# Patient Record
Sex: FEMALE | Race: WHITE | HISPANIC OR LATINO | Employment: UNEMPLOYED | URBAN - METROPOLITAN AREA
[De-identification: names, ages, dates, MRNs, and addresses within clinical notes are randomized per-mention and may not be internally consistent; named-entity substitution may affect disease eponyms.]

---

## 2022-02-28 ENCOUNTER — HOSPITAL ENCOUNTER (EMERGENCY)
Facility: HOSPITAL | Age: 27
Discharge: HOME | End: 2022-02-28
Attending: EMERGENCY MEDICINE | Admitting: EMERGENCY MEDICINE
Payer: MEDICARE

## 2022-02-28 VITALS
HEART RATE: 98 BPM | SYSTOLIC BLOOD PRESSURE: 140 MMHG | RESPIRATION RATE: 18 BRPM | WEIGHT: 242.51 LBS | OXYGEN SATURATION: 99 % | TEMPERATURE: 98.5 F | DIASTOLIC BLOOD PRESSURE: 87 MMHG

## 2022-02-28 DIAGNOSIS — R10.30 LOWER ABDOMINAL PAIN: Primary | ICD-10-CM

## 2022-02-28 LAB
BILIRUB UR QL STRIP: NEGATIVE
CLARITY UR: CLEAR
COLOR UR: YELLOW
EXT PREG TEST URINE: NORMAL
EXT. CONTROL ED NAV: NORMAL
GLUCOSE UR STRIP-MCNC: NEGATIVE MG/DL
HGB UR QL STRIP.AUTO: NEGATIVE
KETONES UR STRIP-MCNC: NEGATIVE MG/DL
LEUKOCYTE ESTERASE UR QL STRIP: NEGATIVE
NITRITE UR QL STRIP: NEGATIVE
PH UR STRIP.AUTO: 6 [PH] (ref 4.5–8)
PROT UR STRIP-MCNC: NEGATIVE MG/DL
SP GR UR STRIP.AUTO: >=1.03 (ref 1–1.03)
UROBILINOGEN UR QL STRIP.AUTO: 0.2 E.U./DL

## 2022-02-28 PROCEDURE — 99284 EMERGENCY DEPT VISIT MOD MDM: CPT

## 2022-02-28 PROCEDURE — 99284 EMERGENCY DEPT VISIT MOD MDM: CPT | Performed by: EMERGENCY MEDICINE

## 2022-02-28 PROCEDURE — 87491 CHLMYD TRACH DNA AMP PROBE: CPT | Performed by: EMERGENCY MEDICINE

## 2022-02-28 PROCEDURE — 81003 URINALYSIS AUTO W/O SCOPE: CPT

## 2022-02-28 PROCEDURE — 87591 N.GONORRHOEAE DNA AMP PROB: CPT | Performed by: EMERGENCY MEDICINE

## 2022-02-28 PROCEDURE — 81025 URINE PREGNANCY TEST: CPT | Performed by: EMERGENCY MEDICINE

## 2022-02-28 RX ORDER — IBUPROFEN 600 MG/1
600 TABLET ORAL ONCE
Status: COMPLETED | OUTPATIENT
Start: 2022-02-28 | End: 2022-02-28

## 2022-02-28 RX ADMIN — IBUPROFEN 600 MG: 600 TABLET ORAL at 16:49

## 2022-02-28 NOTE — ED PROVIDER NOTES
History  Chief Complaint   Patient presents with    Abdominal Pain     Pt report lower abdominal pain for 2 days with pink spotting     11-year-old female without significant past medical history presenting for evaluation of lower abdominal pain that started 2 days ago with radiation into the low back  Patient denies any vaginal bleeding or spotting or hematuria or dysuria to myself  However, triage reports endorsement of pink spotting  States that she has not had anything like this before  She has been working a lot and is very physical at her job, thinks this may be playing a role  She denies any chance of pregnancy, denies any history of STIs  Denies any vaginal discharge  No vaginal lesions  No diarrhea or bloody stools  Pain is not on 1 side more than the other  It is feels like a diffuse pain in her lower belly with radiation into her low back  No upper flank pain  Denies any shortness of breath, coughing, chest pain, nausea, vomiting  History provided by:  Patient   used: Yes    Abdominal Pain  Associated symptoms: no chest pain, no chills, no cough, no diarrhea, no dysuria, no fever, no hematuria, no nausea, no shortness of breath, no sore throat and no vomiting        None       History reviewed  No pertinent past medical history  History reviewed  No pertinent surgical history  History reviewed  No pertinent family history  I have reviewed and agree with the history as documented  E-Cigarette/Vaping     E-Cigarette/Vaping Substances     Social History     Tobacco Use    Smoking status: Current Every Day Smoker     Packs/day: 0 25    Smokeless tobacco: Never Used   Substance Use Topics    Alcohol use: Never    Drug use: Never        Review of Systems   Constitutional: Negative for chills and fever  HENT: Negative for congestion and sore throat  Eyes: Negative for visual disturbance  Respiratory: Negative for cough, shortness of breath and wheezing  Cardiovascular: Negative for chest pain and palpitations  Gastrointestinal: Positive for abdominal pain  Negative for diarrhea, nausea and vomiting  Genitourinary: Negative for dysuria and hematuria  Musculoskeletal: Negative for arthralgias and back pain  Skin: Negative for color change and rash  Neurological: Negative for syncope and weakness  Psychiatric/Behavioral: Negative for confusion  The patient is not nervous/anxious  All other systems reviewed and are negative  Physical Exam  ED Triage Vitals [02/28/22 1618]   Temperature Pulse Respirations Blood Pressure SpO2   98 5 °F (36 9 °C) 98 18 140/87 99 %      Temp Source Heart Rate Source Patient Position - Orthostatic VS BP Location FiO2 (%)   Oral -- -- -- --      Pain Score       6             Orthostatic Vital Signs  Vitals:    02/28/22 1618   BP: 140/87   Pulse: 98       Physical Exam  Vitals and nursing note reviewed  Constitutional:       General: She is not in acute distress  Appearance: She is well-developed  She is not ill-appearing or diaphoretic  HENT:      Head: Normocephalic and atraumatic  Mouth/Throat:      Mouth: Mucous membranes are moist    Eyes:      General: No scleral icterus  Conjunctiva/sclera: Conjunctivae normal    Cardiovascular:      Rate and Rhythm: Normal rate and regular rhythm  Heart sounds: No murmur heard  Pulmonary:      Effort: Pulmonary effort is normal  No respiratory distress  Breath sounds: Normal breath sounds  No wheezing  Abdominal:      Palpations: Abdomen is soft  Tenderness: There is no abdominal tenderness  There is no guarding  Genitourinary:     Rectum: Normal    Musculoskeletal:      Cervical back: Neck supple  Skin:     General: Skin is warm and dry  Neurological:      General: No focal deficit present  Mental Status: She is alert     Psychiatric:         Mood and Affect: Mood normal          ED Medications  Medications   ibuprofen (MOTRIN) tablet 600 mg (600 mg Oral Given 2/28/22 1649)       Diagnostic Studies  Results Reviewed     Procedure Component Value Units Date/Time    Chlamydia/GC amplified DNA by PCR [[id number]] Collected: 02/28/22 1648    Lab Status: In process Specimen: Urine, Other Updated: 02/28/22 1650    POCT pregnancy, urine [[id number]]  (Normal) Resulted: 02/28/22 1647    Lab Status: Final result Updated: 02/28/22 1647     EXT PREG TEST UR (Ref: Negative) Negative (-)     Control Valid    Urine Macroscopic, POC [[id number]] Collected: 02/28/22 1643    Lab Status: Final result Specimen: Urine Updated: 02/28/22 1645     Color, UA Yellow     Clarity, UA Clear     pH, UA 6 0     Leukocytes, UA Negative     Nitrite, UA Negative     Protein, UA Negative mg/dl      Glucose, UA Negative mg/dl      Ketones, UA Negative mg/dl      Urobilinogen, UA 0 2 E U /dl      Bilirubin, UA Negative     Blood, UA Negative     Specific Gravity, UA >=1 030    Narrative:      CLINITEK RESULT                 No orders to display         Procedures  Procedures      ED Course  ED Course as of 03/01/22 0046   Mon Feb 28, 2022 1652 PREGNANCY TEST URINE: Negative (-)                                       MDM  Number of Diagnoses or Management Options  Lower abdominal pain  Diagnosis management comments: 67-year-old female presenting for diffuse lower abdominal pain, associated with working and moving around  No other associated symptoms  Urine pregnancy negative  UA unremarkable  Urine GC chlamydia sent although low suspicion for this  Patient has no significant abdominal tenderness on exam   Discussed that if she goes home and her pain worsens she needs to come back in for a CT scan  Discussed that if the pain localizes to 1 side more than another that is also more concerning  Recommended follow-up with her primary care physician as well as a referral for the gynecologist for regular routine care  She is understanding    Provided with work note as well for rest   Discharge  Disposition  Final diagnoses:   Lower abdominal pain     Time reflects when diagnosis was documented in both MDM as applicable and the Disposition within this note     Time User Action Codes Description Comment    2/28/2022  5:27 PM Bita Blackburn Add [R10 30] Lower abdominal pain       ED Disposition     ED Disposition Condition Date/Time Comment    Discharge Good Mon Feb 28, 2022  5:34 PM Mali Chavacecil Maryanne discharge to home/self care  Follow-up Information     Follow up With Specialties Details Why Contact Info Additional Information    [address] Emergency Medicine Go to  As needed [facility] [id number]  [facility] Emergency Department, [address] [place] [address] , [place], [place], [address] OB/GYN Obstetrics and Gynecology Schedule an appointment as soon as possible for a visit  For reevaluation as we discussed  [address] [id number]  [address] [place] [address], [address], [address] [place] [address], [phone]   [facility] Schedule an appointment as soon as possible for a visit  For reevaluation as we discussed  [address], [address] [facility], [address], [address] [place], [place], [address]          There are no discharge medications for this patient  PDMP Review     None           ED Provider  Attending physically available and evaluated Beryle Opoka I managed the patient along with the ED Attending      Electronically Signed by         Prasanna Srinivasan MD  03/01/22 2188

## 2022-02-28 NOTE — Clinical Note
Darren Davison was seen and treated in our emergency department on 2/28/2022  Diagnosis:     Merline    She may return on this date: If you have any questions or concerns, please don't hesitate to call        Kiana Albrecht MD    ______________________________           _______________          _______________  Hospital Representative                              Date                                Time

## 2022-03-01 LAB
C TRACH DNA SPEC QL NAA+PROBE: NEGATIVE
N GONORRHOEA DNA SPEC QL NAA+PROBE: NEGATIVE

## 2023-12-19 ENCOUNTER — HOSPITAL ENCOUNTER (EMERGENCY)
Facility: HOSPITAL | Age: 28
Discharge: HOME | End: 2023-12-19
Attending: EMERGENCY MEDICINE
Payer: MEDICARE

## 2023-12-19 ENCOUNTER — APPOINTMENT (EMERGENCY)
Dept: ULTRASOUND IMAGING | Facility: HOSPITAL | Age: 28
End: 2023-12-19
Payer: MEDICARE

## 2023-12-19 VITALS
DIASTOLIC BLOOD PRESSURE: 89 MMHG | TEMPERATURE: 98.1 F | RESPIRATION RATE: 19 BRPM | SYSTOLIC BLOOD PRESSURE: 102 MMHG | HEIGHT: 64 IN | WEIGHT: 192.68 LBS | OXYGEN SATURATION: 97 % | BODY MASS INDEX: 32.9 KG/M2 | HEART RATE: 75 BPM

## 2023-12-19 DIAGNOSIS — Z3A.01 7 WEEKS GESTATION OF PREGNANCY: ICD-10-CM

## 2023-12-19 DIAGNOSIS — R10.32 LLQ ABDOMINAL PAIN: ICD-10-CM

## 2023-12-19 DIAGNOSIS — N93.9 VAGINAL BLEEDING: Primary | ICD-10-CM

## 2023-12-19 LAB
ABO GROUP BLD: NORMAL
ABO GROUP BLD: NORMAL
ALBUMIN SERPL BCP-MCNC: 3.4 G/DL (ref 3.5–5)
ALP SERPL-CCNC: 53 U/L (ref 34–104)
ALT SERPL W P-5'-P-CCNC: 11 U/L (ref 7–52)
ANION GAP SERPL CALCULATED.3IONS-SCNC: 4 MMOL/L
AST SERPL W P-5'-P-CCNC: 10 U/L (ref 13–39)
B-HCG SERPL-ACNC: ABNORMAL MIU/ML (ref 0–5)
BACTERIA UR QL AUTO: ABNORMAL /HPF
BASOPHILS # BLD AUTO: 0.02 THOUSANDS/ÂΜL (ref 0–0.1)
BASOPHILS NFR BLD AUTO: 0 % (ref 0–1)
BILIRUB SERPL-MCNC: 0.26 MG/DL (ref 0.2–1)
BILIRUB UR QL STRIP: NEGATIVE
BLD GP AB SCN SERPL QL: NEGATIVE
BUN SERPL-MCNC: 12 MG/DL (ref 5–25)
CALCIUM ALBUM COR SERPL-MCNC: 9.2 MG/DL (ref 8.3–10.1)
CALCIUM SERPL-MCNC: 8.7 MG/DL (ref 8.4–10.2)
CHLORIDE SERPL-SCNC: 106 MMOL/L (ref 96–108)
CLARITY UR: CLEAR
CO2 SERPL-SCNC: 24 MMOL/L (ref 21–32)
COLOR UR: YELLOW
CREAT SERPL-MCNC: 0.7 MG/DL (ref 0.6–1.3)
EOSINOPHIL # BLD AUTO: 0.13 THOUSAND/ÂΜL (ref 0–0.61)
EOSINOPHIL NFR BLD AUTO: 2 % (ref 0–6)
ERYTHROCYTE [DISTWIDTH] IN BLOOD BY AUTOMATED COUNT: 14.1 % (ref 11.6–15.1)
GFR SERPL CREATININE-BSD FRML MDRD: 117 ML/MIN/1.73SQ M
GLUCOSE SERPL-MCNC: 85 MG/DL (ref 65–140)
GLUCOSE UR STRIP-MCNC: NEGATIVE MG/DL
HCT VFR BLD AUTO: 33.9 % (ref 34.8–46.1)
HGB BLD-MCNC: 10.8 G/DL (ref 11.5–15.4)
HGB UR QL STRIP.AUTO: ABNORMAL
IMM GRANULOCYTES # BLD AUTO: 0.02 THOUSAND/UL (ref 0–0.2)
IMM GRANULOCYTES NFR BLD AUTO: 0 % (ref 0–2)
KETONES UR STRIP-MCNC: NEGATIVE MG/DL
LEUKOCYTE ESTERASE UR QL STRIP: ABNORMAL
LYMPHOCYTES # BLD AUTO: 1.65 THOUSANDS/ÂΜL (ref 0.6–4.47)
LYMPHOCYTES NFR BLD AUTO: 21 % (ref 14–44)
MCH RBC QN AUTO: 26.8 PG (ref 26.8–34.3)
MCHC RBC AUTO-ENTMCNC: 31.9 G/DL (ref 31.4–37.4)
MCV RBC AUTO: 84 FL (ref 82–98)
MONOCYTES # BLD AUTO: 0.52 THOUSAND/ÂΜL (ref 0.17–1.22)
MONOCYTES NFR BLD AUTO: 7 % (ref 4–12)
MUCOUS THREADS UR QL AUTO: ABNORMAL
NEUTROPHILS # BLD AUTO: 5.54 THOUSANDS/ÂΜL (ref 1.85–7.62)
NEUTS SEG NFR BLD AUTO: 70 % (ref 43–75)
NITRITE UR QL STRIP: NEGATIVE
NON-SQ EPI CELLS URNS QL MICRO: ABNORMAL /HPF
NRBC BLD AUTO-RTO: 0 /100 WBCS
PH UR STRIP.AUTO: 5.5 [PH] (ref 4.5–8)
PLATELET # BLD AUTO: 279 THOUSANDS/UL (ref 149–390)
PMV BLD AUTO: 10.9 FL (ref 8.9–12.7)
POTASSIUM SERPL-SCNC: 4 MMOL/L (ref 3.5–5.3)
PROT SERPL-MCNC: 6 G/DL (ref 6.4–8.4)
PROT UR STRIP-MCNC: NEGATIVE MG/DL
RBC # BLD AUTO: 4.03 MILLION/UL (ref 3.81–5.12)
RBC #/AREA URNS AUTO: ABNORMAL /HPF
RH BLD: POSITIVE
RH BLD: POSITIVE
SODIUM SERPL-SCNC: 134 MMOL/L (ref 135–147)
SP GR UR STRIP.AUTO: >=1.03 (ref 1–1.03)
SPECIMEN EXPIRATION DATE: NORMAL
UROBILINOGEN UR QL STRIP.AUTO: 0.2 E.U./DL
WBC # BLD AUTO: 7.88 THOUSAND/UL (ref 4.31–10.16)
WBC #/AREA URNS AUTO: ABNORMAL /HPF

## 2023-12-19 PROCEDURE — 99284 EMERGENCY DEPT VISIT MOD MDM: CPT

## 2023-12-19 PROCEDURE — 86900 BLOOD TYPING SEROLOGIC ABO: CPT

## 2023-12-19 PROCEDURE — 80053 COMPREHEN METABOLIC PANEL: CPT

## 2023-12-19 PROCEDURE — 81001 URINALYSIS AUTO W/SCOPE: CPT

## 2023-12-19 PROCEDURE — 86901 BLOOD TYPING SEROLOGIC RH(D): CPT

## 2023-12-19 PROCEDURE — 36415 COLL VENOUS BLD VENIPUNCTURE: CPT

## 2023-12-19 PROCEDURE — 87086 URINE CULTURE/COLONY COUNT: CPT

## 2023-12-19 PROCEDURE — 85025 COMPLETE CBC W/AUTO DIFF WBC: CPT

## 2023-12-19 PROCEDURE — 84702 CHORIONIC GONADOTROPIN TEST: CPT

## 2023-12-19 PROCEDURE — 76801 OB US < 14 WKS SINGLE FETUS: CPT

## 2023-12-19 PROCEDURE — 86850 RBC ANTIBODY SCREEN: CPT

## 2023-12-19 NOTE — DISCHARGE INSTRUCTIONS
Continue prenatal vitamins. FU with OBGYN.   You are pregnant: 7 weeks 1 days.   FAHEEM of 08/05/2024.

## 2023-12-19 NOTE — ED PROVIDER NOTES
History  Chief Complaint   Patient presents with    Vaginal Bleeding - Pregnant     7 weeks pregnant, has been bleeding the whole time, 2 days ago it stopped but began bleeding., pain to LLQ     28 YOF  approx 7 weeks pregnant presents today with vaginal bleeding and LLQ pain. Pt reports since finding out she was pregnant she has been having some slight bleeding. Reports it stopped for abut 2 days but then started again this morning. Pt reports it is very light in color and only on the toilet paper when she wipes, nothing in the toilet when she urinates. States she does wear a panty liner but does not have to change it due to bleeding. Also reporting developing LLQ pain last night. This pain is new and has not occurred in her pregnancy yet. Pt denies any LH, dizziness, vaginal discharge. Reports she is going to be following with OB at [facility]. Also reporting constipation- was seen at [facility] ER yesterday and prescribed colace.     Chart review: pt seen at [facility] on 12/15 for vaginal bleeding. Pelvic examination completed- no vaginal bleeding noted. Minor white discharge noted. Blood work was done in the ED. Stable H&H. B+. No white count. No electrolyte or renal dysfunction. Normal LFTs. UA with WBC and leuko so they prescribed Keflex. hCG positive to 51,000. US confirming IUP with FHR of 119. Small subchorionic hematoma noted.        None       History reviewed. No pertinent past medical history.    Past Surgical History:   Procedure Laterality Date    BARIATRIC SURGERY  2023       History reviewed. No pertinent family history.  I have reviewed and agree with the history as documented.    E-Cigarette/Vaping    E-Cigarette Use Never User      E-Cigarette/Vaping Substances     Social History     Tobacco Use    Smoking status: Former     Current packs/day: 0.25     Types: Cigarettes    Smokeless tobacco: Never   Vaping Use    Vaping status: Never Used   Substance Use Topics    Alcohol use: Never    Drug use: Never        Review of Systems   Constitutional:  Negative for chills and fever.   Gastrointestinal:  Positive for abdominal pain and constipation. Negative for nausea and vomiting.   Genitourinary:  Positive for vaginal bleeding. Negative for vaginal discharge.   All other systems reviewed and are negative.      Physical Exam  Physical Exam  Vitals and nursing note reviewed.   Constitutional:       General: She is not in acute distress.     Appearance: Normal appearance. She is well-developed. She is not ill-appearing.   HENT:      Head: Normocephalic and atraumatic.   Eyes:      Conjunctiva/sclera: Conjunctivae normal.   Cardiovascular:      Rate and Rhythm: Normal rate.   Pulmonary:      Effort: Pulmonary effort is normal.   Abdominal:      Palpations: Abdomen is soft.      Tenderness: There is abdominal tenderness (LLQ).   Genitourinary:     Comments: Deferred   Musculoskeletal:         General: Normal range of motion.      Cervical back: Normal range of motion and neck supple.   Skin:     General: Skin is warm and dry.      Capillary Refill: Capillary refill takes less than 2 seconds.   Neurological:      Mental Status: She is alert.   Psychiatric:         Mood and Affect: Mood normal.         Vital Signs  ED Triage Vitals [12/19/23 1443]   Temperature Pulse Respirations Blood Pressure SpO2   98.1 °F (36.7 °C) 75 19 102/89 97 %      Temp Source Heart Rate Source Patient Position - Orthostatic VS BP Location FiO2 (%)   Oral Monitor -- -- --      Pain Score       4           Vitals:    12/19/23 1443   BP: 102/89   Pulse: 75         Visual Acuity      ED Medications  Medications - No data to display    Diagnostic Studies  Results Reviewed       Procedure Component Value Units Date/Time    Urine culture [[id number]] Collected: 12/19/23 1522    Lab Status: In process Specimen: Urine, Clean Catch Updated: 12/19/23 1604    Urine Macroscopic, POC [[id number]]  (Abnormal) Collected: 12/19/23 1522    Lab Status: Final result  Specimen: Urine Updated: 23     Color, UA Yellow     Clarity, UA Clear     pH, UA 5.5     Leukocytes, UA Trace     Nitrite, UA Negative     Protein, UA Negative mg/dl      Glucose, UA Negative mg/dl      Ketones, UA Negative mg/dl      Urobilinogen, UA 0.2 E.U./dl      Bilirubin, UA Negative     Occult Blood, UA Moderate     Specific Gravity, UA >=1.030    Narrative:      CLINITEK RESULT    Urine Microscopic [[id number]] Collected: 23 1522    Lab Status: No result Specimen: Urine, Clean Catch     CBC and differential [[id number]]     Lab Status: No result Specimen: Blood     Comprehensive metabolic panel [[id number]]     Lab Status: No result Specimen: Blood     Quantitative hCG [[id number]]     Lab Status: No result Specimen: Blood                    US OB < 14 weeks single or first gestation level 1    (Results Pending)              Procedures  Procedures         ED Course  ED Course as of 23 1613   Tue Dec 19, 2023   1540 Leukocytes, UA(!): Trace   1540 Blood, UA(!): Moderate   1612 Signed out to Blanca Adams PA-C: pending labs and US                                             Medical Decision Making  28 YOF  approx 7 weeks pregnant presents today with vaginal bleeding and LLQ pain. Physical exam as noted above. Will obtain basic labs and US.     Signed out to Blanca Adams PA-C: pending labs and US     Amount and/or Complexity of Data Reviewed  Labs: ordered. Decision-making details documented in ED Course.  Radiology: ordered.             Disposition  Final diagnoses:   Vaginal bleeding   LLQ abdominal pain     Time reflects when diagnosis was documented in both MDM as applicable and the Disposition within this note       Time User Action Codes Description Comment    2023  4:13 PM Mati Fu Add [N93.9] Vaginal bleeding     2023  4:13 PM Mati Fu Add [R10.32] LLQ abdominal pain           ED Disposition       None          Follow-up Information    None          Patient's Medications    No medications on file       No discharge procedures on file.    PDMP Review       None            ED Provider  Electronically Signed by             Mati Fu PA-C  12/19/23 2311

## 2023-12-19 NOTE — ED CARE HANDOFF
Emergency Department Sign Out Note        Sign out and transfer of care from Mati Fu PAC. See Separate Emergency Department note.     The patient, Merline Madden, was evaluated by the previous provider for vaginal bleeding.    Workup Completed:  Labs ordered    ED Course / Workup Pending (followup):  Signed to myself awaiting labs and US results  Spoke with US tech  7 weeks pregnant   Pt reports she has FU with OBGYN - [facility] but wants to transition to [facility] referral   Discussed prenatal vitamins  No vomiting                                   ED Course as of 12/19/23 1753   Tue Dec 19, 2023   1608 Signed to myself first trimester bleeding and pain - 7 weeks preg    1743 Rh Factor: Positive  No rhogham needed      Procedures  Medical Decision Making  Amount and/or Complexity of Data Reviewed  Labs: ordered. Decision-making details documented in ED Course.  Radiology: ordered.            Disposition  Final diagnoses:   Vaginal bleeding   LLQ abdominal pain   7 weeks gestation of pregnancy     Time reflects when diagnosis was documented in both MDM as applicable and the Disposition within this note       Time User Action Codes Description Comment    12/19/2023  4:13 PM Mati Fu Add [N93.9] Vaginal bleeding     12/19/2023  4:13 PM Mati Fu Add [R10.32] LLQ abdominal pain     12/19/2023  5:44 PM Blanca Adams Add [Z3A.01] 7 weeks gestation of pregnancy           ED Disposition       ED Disposition   Discharge    Condition   Stable    Date/Time   Tue Dec 19, 2023  5:44 PM    Comment   Merline Madden discharge to home/self care.                   Follow-up Information       Follow up With Specialties Details Why Contact Info Additional Information    [facility]stetrics [facility] Gyne[facility]   [address] [place] [address]  Wilfrido [address] [place] [id number]  [phone] [facility], [address] [place] [address], [address],  [place], [place], [address]   [phone]    Angela Quesada MD Obstetrics and Gynecology, Gynecology, Obstetrics   [address] [place] [address]  [address]  [facility] [address] [phone]             Patient's Medications    No medications on file            ED Provider  Electronically Signed by     Blanca Adams PA-C  12/19/23 8719

## 2023-12-21 LAB — BACTERIA UR CULT: ABNORMAL

## 2023-12-27 ENCOUNTER — HOSPITAL ENCOUNTER (EMERGENCY)
Facility: HOSPITAL | Age: 28
Discharge: HOME | End: 2023-12-27
Attending: EMERGENCY MEDICINE
Payer: MEDICARE

## 2023-12-27 VITALS
HEART RATE: 74 BPM | TEMPERATURE: 98.3 F | WEIGHT: 194 LBS | SYSTOLIC BLOOD PRESSURE: 98 MMHG | RESPIRATION RATE: 18 BRPM | DIASTOLIC BLOOD PRESSURE: 57 MMHG | OXYGEN SATURATION: 95 % | BODY MASS INDEX: 33.3 KG/M2

## 2023-12-27 DIAGNOSIS — O20.0 THREATENED MISCARRIAGE IN EARLY PREGNANCY: ICD-10-CM

## 2023-12-27 DIAGNOSIS — N39.0 ACUTE URINARY TRACT INFECTION: Primary | ICD-10-CM

## 2023-12-27 LAB
B-HCG SERPL-ACNC: ABNORMAL MIU/ML (ref 0–5)
BACTERIA UR QL AUTO: ABNORMAL /HPF
BILIRUB UR QL STRIP: NEGATIVE
CLARITY UR: ABNORMAL
COLOR UR: YELLOW
GLUCOSE UR STRIP-MCNC: NEGATIVE MG/DL
HGB UR QL STRIP.AUTO: ABNORMAL
KETONES UR STRIP-MCNC: NEGATIVE MG/DL
LEUKOCYTE ESTERASE UR QL STRIP: ABNORMAL
MUCOUS THREADS UR QL AUTO: ABNORMAL
NITRITE UR QL STRIP: NEGATIVE
NON-SQ EPI CELLS URNS QL MICRO: ABNORMAL /HPF
PH UR STRIP.AUTO: 6.5 [PH] (ref 4.5–8)
PROT UR STRIP-MCNC: ABNORMAL MG/DL
RBC #/AREA URNS AUTO: ABNORMAL /HPF
SP GR UR STRIP.AUTO: >=1.03 (ref 1–1.03)
UROBILINOGEN UR QL STRIP.AUTO: 2 E.U./DL
WBC #/AREA URNS AUTO: ABNORMAL /HPF

## 2023-12-27 PROCEDURE — 87086 URINE CULTURE/COLONY COUNT: CPT

## 2023-12-27 PROCEDURE — 84702 CHORIONIC GONADOTROPIN TEST: CPT | Performed by: EMERGENCY MEDICINE

## 2023-12-27 PROCEDURE — 36415 COLL VENOUS BLD VENIPUNCTURE: CPT | Performed by: EMERGENCY MEDICINE

## 2023-12-27 PROCEDURE — 99283 EMERGENCY DEPT VISIT LOW MDM: CPT

## 2023-12-27 PROCEDURE — 99284 EMERGENCY DEPT VISIT MOD MDM: CPT | Performed by: EMERGENCY MEDICINE

## 2023-12-27 PROCEDURE — 81001 URINALYSIS AUTO W/SCOPE: CPT

## 2023-12-27 RX ORDER — NITROFURANTOIN 25; 75 MG/1; MG/1
100 CAPSULE ORAL ONCE
Status: DISCONTINUED | OUTPATIENT
Start: 2023-12-27 | End: 2023-12-27

## 2023-12-27 RX ORDER — CEFPODOXIME PROXETIL 200 MG/1
200 TABLET, FILM COATED ORAL ONCE
Status: COMPLETED | OUTPATIENT
Start: 2023-12-27 | End: 2023-12-27

## 2023-12-27 RX ORDER — CEFPODOXIME PROXETIL 200 MG/1
200 TABLET, FILM COATED ORAL 2 TIMES DAILY
Qty: 14 TABLET | Refills: 0 | Status: SHIPPED | OUTPATIENT
Start: 2023-12-28 | End: 2024-01-04

## 2023-12-27 RX ADMIN — CEFPODOXIME PROXETIL 200 MG: 200 TABLET, FILM COATED ORAL at 19:57

## 2023-12-27 NOTE — ED PROVIDER NOTES
History  Chief Complaint   Patient presents with    Vaginal Bleeding - Pregnant     Vaginal bleeding starting today. 7 weeks pregnant.      28 y.o.   F at approx 8 weeks pregnant p/w vaginal spotting.  Pt was here on  for vaginal spotting.  She had pelvic US read as single live intrauterine gestation at 7 weeks 1 days.  Pt is B+.  Pt reports having spotting and lower abd cramps. She thinks she is having some dysuria and blood in her urine.      History provided by:  Patient   used: No        None       History reviewed. No pertinent past medical history.    Past Surgical History:   Procedure Laterality Date    BARIATRIC SURGERY  2023       History reviewed. No pertinent family history.  I have reviewed and agree with the history as documented.    E-Cigarette/Vaping    E-Cigarette Use Never User      E-Cigarette/Vaping Substances     Social History     Tobacco Use    Smoking status: Former     Current packs/day: 0.25     Types: Cigarettes    Smokeless tobacco: Never   Vaping Use    Vaping status: Never Used   Substance Use Topics    Alcohol use: Never    Drug use: Never       Review of Systems   Constitutional:  Negative for chills and fever.   Gastrointestinal:  Positive for abdominal pain (cramping). Negative for nausea and vomiting.   Genitourinary:  Positive for dysuria and vaginal bleeding. Negative for frequency.   Musculoskeletal:  Positive for back pain (Low).       Physical Exam  Physical Exam  Vitals and nursing note reviewed.   Constitutional:       General: She is not in acute distress.     Appearance: Normal appearance. She is well-developed. She is not ill-appearing, toxic-appearing or diaphoretic.   HENT:      Head: Normocephalic and atraumatic.   Eyes:      General: No scleral icterus.  Neck:      Vascular: No JVD.   Cardiovascular:      Rate and Rhythm: Normal rate and regular rhythm.      Heart sounds: Normal heart sounds. No murmur heard.  Pulmonary:      Effort:  Pulmonary effort is normal. No accessory muscle usage or respiratory distress.      Breath sounds: Normal breath sounds. No stridor. No wheezing, rhonchi or rales.   Abdominal:      General: There is no distension.      Palpations: Abdomen is soft. Abdomen is not rigid. There is no mass.      Tenderness: There is no abdominal tenderness. There is no guarding or rebound. Negative signs include Fitzpatrick's sign and McBurney's sign.   Skin:     General: Skin is warm and dry.      Coloration: Skin is not jaundiced or pale.      Findings: No rash.   Neurological:      Mental Status: She is alert.      GCS: GCS eye subscore is 4. GCS verbal subscore is 5. GCS motor subscore is 6.         Vital Signs  ED Triage Vitals   Temperature Pulse Respirations Blood Pressure SpO2   12/27/23 1611 12/27/23 1611 12/27/23 1611 12/27/23 1611 12/27/23 1611   98.3 °F (36.8 °C) 70 18 (!) 91/42 100 %      Temp src Heart Rate Source Patient Position - Orthostatic VS BP Location FiO2 (%)   -- 12/27/23 1611 12/27/23 1811 12/27/23 1811 --    Monitor Sitting Left arm       Pain Score       --                  Vitals:    12/27/23 1611 12/27/23 1811   BP: (!) 91/42 98/57   Pulse: 70 74   Patient Position - Orthostatic VS:  Sitting         Visual Acuity      ED Medications  Medications   cefpodoxime (VANTIN) tablet 200 mg (200 mg Oral Given 12/27/23 1957)       Diagnostic Studies  Results Reviewed       Procedure Component Value Units Date/Time    Quantitative hCG [[id number]]  (Abnormal) Collected: 12/27/23 1804    Lab Status: Final result Specimen: Blood from Arm, Right Updated: 12/27/23 1914     HCG, Quant 136,522 mIU/mL     Narrative:       Expected Ranges:    HCG results between 5 and 25 mIU/mL may be indicative of early pregnancy but should be interpreted in light of the total clinical presentation.    HCG can rise to detectable levels in jose c and post menopausal women (0-11.6 mIU/mL).     Approximate               Approximate HCG  Gestation age           Concentration ( mIU/mL)  _____________          ______________________   Weeks                      HCG values  0.2-1                       5-50  1-2                           2-3                         100-5000  3-4                         500-99696  4-5                         1000-23469  5-6                         [id number]  6-8                         [id number]  8-12                        09530-642824      Urine Microscopic [[id number]]  (Abnormal) Collected: 12/27/23 1807    Lab Status: Final result Specimen: Urine, Clean Catch Updated: 12/27/23 1906     RBC, UA 10-20 /hpf      WBC, UA Innumerable /hpf      Epithelial Cells Occasional /hpf      Bacteria, UA Occasional /hpf      MUCUS THREADS Moderate    Urine culture [[id number]] Collected: 12/27/23 1807    Lab Status: In process Specimen: Urine, Clean Catch Updated: 12/27/23 1814    Urine Macroscopic, POC [[id number]]  (Abnormal) Collected: 12/27/23 1807    Lab Status: Final result Specimen: Urine Updated: 12/27/23 1808     Color, UA Yellow     Clarity, UA Slightly Cloudy     pH, UA 6.5     Leukocytes, UA Small     Nitrite, UA Negative     Protein, UA Trace mg/dl      Glucose, UA Negative mg/dl      Ketones, UA Negative mg/dl      Urobilinogen, UA 2.0 E.U./dl      Bilirubin, UA Negative     Occult Blood, UA Large     Specific Gravity, UA >=1.030    Narrative:      CLINITEK RESULT                   No orders to display              Procedures  POC Pelvic US    Date/Time: 12/27/2023 5:58 PM    Performed by: Kenyatta Turner DO  Authorized by: Kenyatta Turner DO    Patient location:  ED  Procedure details:     Exam Type:  Diagnostic    Indications: evaluate for IUP      Technique:  Transabdominal obstetric (HCG+) exam  Uterine findings:     Intrauterine pregnancy: identified      Fetal heart rate: identified      Fetal heart rate (bpm):  167           ED Course  ED Course as of 12/27/23 2008   Wed Dec 27, 2023   1906 Bacteria, UA:  Occasional  Will treat for UTI   1921 Updated pt on plan to treat for UTI and instructed her to f/u with OBGYN                               SBIRT 20yo+      Flowsheet Row Most Recent Value   Initial Alcohol Screen: US AUDIT-C     1. How often do you have a drink containing alcohol? 0 Filed at: 12/27/2023 1847   2. How many drinks containing alcohol do you have on a typical day you are drinking?  0 Filed at: 12/27/2023 1847   3b. FEMALE Any Age, or MALE 65+: How often do you have 4 or more drinks on one occassion? 0 Filed at: 12/27/2023 1847   Audit-C Score 0 Filed at: 12/27/2023 1847   ZELALEM: How many times in the past year have you...    Used an illegal drug or used a prescription medication for non-medical reasons? Never Filed at: 12/27/2023 1847                      Medical Decision Making  Lower abd cramps/spotting - Will check quant, urine to r/o UTI, bedside US.    Amount and/or Complexity of Data Reviewed  Labs: ordered. Decision-making details documented in ED Course.    Risk  Prescription drug management.             Disposition  Final diagnoses:   Acute urinary tract infection   Threatened miscarriage in early pregnancy     Time reflects when diagnosis was documented in both MDM as applicable and the Disposition within this note       Time User Action Codes Description Comment    12/27/2023  7:16 PM Kenyatta Turner Add [N39.0] Acute urinary tract infection     12/27/2023  7:18 PM Kenyatta Turner Add [O20.0] Threatened miscarriage in early pregnancy           ED Disposition       ED Disposition   Discharge    Condition   Stable    Date/Time   Wed Dec 27, 2023 1918    Comment   Merline Madden discharge to home/self care.                   Follow-up Information       Follow up With Specialties Details Why Contact Info    Your OBGYN  Schedule an appointment as soon as possible for a visit               Patient's Medications   Discharge Prescriptions    CEFPODOXIME (VANTIN) 200 MG TABLET    Take 1  tablet (200 mg total) by mouth 2 (two) times a day for 7 days Do not start before December 28, 2023.       Start Date: 12/28/2023End Date: 1/4/2024       Order Dose: 200 mg       Quantity: 14 tablet    Refills: 0       No discharge procedures on file.    PDMP Review       None            ED Provider  Electronically Signed by             Kenyatta Turner DO  12/27/23 2008

## 2023-12-29 LAB — BACTERIA UR CULT: NORMAL

## 2024-01-07 ENCOUNTER — HOSPITAL ENCOUNTER (EMERGENCY)
Facility: HOSPITAL | Age: 29
Discharge: HOME | End: 2024-01-07
Attending: EMERGENCY MEDICINE
Payer: MEDICARE

## 2024-01-07 VITALS
TEMPERATURE: 98.9 F | DIASTOLIC BLOOD PRESSURE: 58 MMHG | HEART RATE: 100 BPM | SYSTOLIC BLOOD PRESSURE: 108 MMHG | OXYGEN SATURATION: 99 % | WEIGHT: 189.15 LBS | BODY MASS INDEX: 32.47 KG/M2 | RESPIRATION RATE: 18 BRPM

## 2024-01-07 DIAGNOSIS — O20.0 THREATENED MISCARRIAGE: Primary | ICD-10-CM

## 2024-01-07 LAB
BACTERIA UR QL AUTO: ABNORMAL /HPF
BILIRUB UR QL STRIP: ABNORMAL
CLARITY UR: CLEAR
COLOR UR: YELLOW
GLUCOSE UR STRIP-MCNC: NEGATIVE MG/DL
HGB UR QL STRIP.AUTO: ABNORMAL
KETONES UR STRIP-MCNC: NEGATIVE MG/DL
LEUKOCYTE ESTERASE UR QL STRIP: ABNORMAL
MUCOUS THREADS UR QL AUTO: ABNORMAL
NITRITE UR QL STRIP: NEGATIVE
NON-SQ EPI CELLS URNS QL MICRO: ABNORMAL /HPF
PH UR STRIP.AUTO: 6 [PH] (ref 4.5–8)
PROT UR STRIP-MCNC: ABNORMAL MG/DL
RBC #/AREA URNS AUTO: ABNORMAL /HPF
SP GR UR STRIP.AUTO: >=1.03 (ref 1–1.03)
UROBILINOGEN UR QL STRIP.AUTO: 1 E.U./DL
WBC #/AREA URNS AUTO: ABNORMAL /HPF

## 2024-01-07 PROCEDURE — 87086 URINE CULTURE/COLONY COUNT: CPT

## 2024-01-07 PROCEDURE — 99284 EMERGENCY DEPT VISIT MOD MDM: CPT | Performed by: EMERGENCY MEDICINE

## 2024-01-07 PROCEDURE — 81001 URINALYSIS AUTO W/SCOPE: CPT

## 2024-01-07 PROCEDURE — 87147 CULTURE TYPE IMMUNOLOGIC: CPT

## 2024-01-07 PROCEDURE — 99284 EMERGENCY DEPT VISIT MOD MDM: CPT

## 2024-01-07 NOTE — ED PROVIDER NOTES
History  Chief Complaint   Patient presents with   • Pelvic Pain     Pt reports lower abd pain with bleeding. Pt reports using 1 pad an hour. Pt also reports feeling lightheaded      Patient is a 29-year-old female.  She is a  1 para 0.  She is 10 weeks by last ultrasound.  Last ultrasound showed IUP.  She has been seen here before for threatened miscarriage.  She presents to the emergency room today because yesterday she had heavy vaginal bleeding.  She had some lower abdominal cramping.  She had some back pain.  The bleeding is actually let up today.  She has been sick with a URI.       None       History reviewed. No pertinent past medical history.    Past Surgical History:   Procedure Laterality Date   • BARIATRIC SURGERY  2023       History reviewed. No pertinent family history.  I have reviewed and agree with the history as documented.    E-Cigarette/Vaping   • E-Cigarette Use Never User      E-Cigarette/Vaping Substances     Social History     Tobacco Use   • Smoking status: Former     Current packs/day: 0.25     Types: Cigarettes   • Smokeless tobacco: Never   Vaping Use   • Vaping status: Never Used   Substance Use Topics   • Alcohol use: Never   • Drug use: Never       Review of Systems   Constitutional:  Negative for chills and fever.   HENT:  Positive for congestion. Negative for sore throat.    Eyes:  Negative for pain, redness and visual disturbance.   Respiratory:  Positive for cough. Negative for shortness of breath.    Cardiovascular:  Negative for chest pain and leg swelling.   Gastrointestinal:  Positive for abdominal pain. Negative for diarrhea and vomiting.   Endocrine: Negative for polydipsia and polyuria.   Genitourinary:  Positive for vaginal bleeding. Negative for dysuria, frequency, hematuria and vaginal discharge.   Musculoskeletal:  Positive for back pain. Negative for neck pain.   Skin:  Negative for rash and wound.   Allergic/Immunologic: Negative for immunocompromised state.    Neurological:  Negative for weakness, numbness and headaches.   Hematological:  Does not bruise/bleed easily.   Psychiatric/Behavioral:  Negative for hallucinations and suicidal ideas.    All other systems reviewed and are negative.      Physical Exam  Physical Exam  Vitals reviewed.   Constitutional:       General: She is not in acute distress.     Appearance: She is obese.   HENT:      Head: Normocephalic and atraumatic.      Nose: Nose normal.      Mouth/Throat:      Mouth: Mucous membranes are moist.   Eyes:      General:         Right eye: No discharge.         Left eye: No discharge.      Conjunctiva/sclera: Conjunctivae normal.   Cardiovascular:      Rate and Rhythm: Normal rate and regular rhythm.      Pulses: Normal pulses.      Heart sounds: Normal heart sounds. No murmur heard.     No friction rub. No gallop.   Pulmonary:      Effort: Pulmonary effort is normal. No respiratory distress.      Breath sounds: Normal breath sounds. No stridor. No wheezing, rhonchi or rales.   Abdominal:      General: Bowel sounds are normal. There is no distension.      Palpations: Abdomen is soft.      Tenderness: There is no abdominal tenderness. There is no right CVA tenderness, left CVA tenderness, guarding or rebound.   Musculoskeletal:         General: No swelling, tenderness, deformity or signs of injury. Normal range of motion.      Cervical back: Normal range of motion and neck supple. No rigidity.      Right lower leg: No edema.      Left lower leg: No edema.      Comments: No calf tenderness or unilateral leg swelling.   Skin:     General: Skin is warm and dry.      Coloration: Skin is not jaundiced.      Findings: No rash.   Neurological:      General: No focal deficit present.      Mental Status: She is alert and oriented to person, place, and time.      Sensory: No sensory deficit.      Motor: Motor function is intact.   Psychiatric:         Mood and Affect: Mood normal.         Behavior: Behavior normal.        Vital Signs  ED Triage Vitals [01/07/24 1402]   Temperature Pulse Respirations Blood Pressure SpO2   98.9 °F (37.2 °C) (!) 107 18 128/71 97 %      Temp Source Heart Rate Source Patient Position - Orthostatic VS BP Location FiO2 (%)   Oral Monitor Sitting Right arm --      Pain Score       --           Vitals:    01/07/24 1402   BP: 128/71   Pulse: (!) 107   Patient Position - Orthostatic VS: Sitting         Visual Acuity      ED Medications  Medications - No data to display    Diagnostic Studies  Results Reviewed       Procedure Component Value Units Date/Time    Urine culture [[id number]] Collected: 01/07/24 1418    Lab Status: In process Specimen: Urine, Clean Catch Updated: 01/07/24 1423    Urine Microscopic [[id number]] Collected: 01/07/24 1418    Lab Status: In process Specimen: Urine, Clean Catch Updated: 01/07/24 1423    Urine Macroscopic, POC [[id number]]  (Abnormal) Collected: 01/07/24 1418    Lab Status: Final result Specimen: Urine Updated: 01/07/24 1420     Color, UA Yellow     Clarity, UA Clear     pH, UA 6.0     Leukocytes, UA Moderate     Nitrite, UA Negative     Protein, UA 30 (1+) mg/dl      Glucose, UA Negative mg/dl      Ketones, UA Negative mg/dl      Urobilinogen, UA 1.0 E.U./dl      Bilirubin, UA Small     Occult Blood, UA Trace     Specific Gravity, UA >=1.030    Narrative:      CLINITEK RESULT                   No orders to display              Procedures  Procedures         ED Course                               SBIRT 20yo+      Flowsheet Row Most Recent Value   Initial Alcohol Screen: US AUDIT-C     1. How often do you have a drink containing alcohol? 0 Filed at: 01/07/2024 1424   2. How many drinks containing alcohol do you have on a typical day you are drinking?  0 Filed at: 01/07/2024 1424   3a. Male UNDER 65: How often do you have five or more drinks on one occasion? 0 Filed at: 01/07/2024 1424   3b. FEMALE Any Age, or MALE 65+: How often do you have 4 or more drinks on one  occassion? 0 Filed at: 01/07/2024 1424   Audit-C Score 0 Filed at: 01/07/2024 1424   ZELALEM: How many times in the past year have you...    Used an illegal drug or used a prescription medication for non-medical reasons? Never Filed at: 01/07/2024 1424                      Medical Decision Making  Bedside ultrasound by ED MD shows a live IUP with good fetal heart tones.  There is some fetal movement.  There is no ectopic pregnancy.  Reviewed old records.  Patient is Rh+.  She is hemodynamically stable.  Doubt significant anemia.  Appropriate for discharge and outpatient management.    Amount and/or Complexity of Data Reviewed  Labs: ordered. Decision-making details documented in ED Course.    Risk  OTC drugs.  Decision regarding hospitalization.           Disposition  Final diagnoses:   Threatened miscarriage     Time reflects when diagnosis was documented in both MDM as applicable and the Disposition within this note       Time User Action Codes Description Comment    1/7/2024  2:52 PM Jermain Baca Add [O20.0] Threatened miscarriage           ED Disposition       ED Disposition   Discharge    Condition   Stable    Date/Time   Sun Jan 7, 2024 1452    Comment   Merline Madden discharge to home/self care.                   Follow-up Information       Follow up With Specialties Details Why Contact Info    Follow-up with your OB/GYN as scheduled                Patient's Medications    No medications on file       No discharge procedures on file.    PDMP Review       None            ED Provider  Electronically Signed by             Jermain Baca MD  01/10/24 0803

## 2024-01-08 LAB — BACTERIA UR CULT: ABNORMAL

## 2024-10-22 ENCOUNTER — HOSPITAL ENCOUNTER (EMERGENCY)
Facility: HOSPITAL | Age: 29
Discharge: HOME | End: 2024-10-22
Attending: EMERGENCY MEDICINE
Payer: MEDICARE

## 2024-10-22 ENCOUNTER — APPOINTMENT (EMERGENCY)
Dept: ULTRASOUND IMAGING | Facility: HOSPITAL | Age: 29
End: 2024-10-22
Payer: MEDICARE

## 2024-10-22 VITALS
WEIGHT: 187.61 LBS | BODY MASS INDEX: 32.2 KG/M2 | TEMPERATURE: 97.9 F | DIASTOLIC BLOOD PRESSURE: 57 MMHG | OXYGEN SATURATION: 100 % | SYSTOLIC BLOOD PRESSURE: 102 MMHG | HEART RATE: 78 BPM | RESPIRATION RATE: 18 BRPM

## 2024-10-22 DIAGNOSIS — O26.899 PELVIC PAIN DURING PREGNANCY: Primary | ICD-10-CM

## 2024-10-22 DIAGNOSIS — N89.8 VAGINAL DISCHARGE: ICD-10-CM

## 2024-10-22 DIAGNOSIS — R10.2 PELVIC PAIN DURING PREGNANCY: Primary | ICD-10-CM

## 2024-10-22 LAB
ABO GROUP BLD: NORMAL
B-HCG SERPL-ACNC: ABNORMAL MIU/ML (ref 0–5)
BACTERIA UR QL AUTO: ABNORMAL /HPF
BILIRUB UR QL STRIP: NEGATIVE
BLD GP AB SCN SERPL QL: NEGATIVE
CLARITY UR: CLEAR
COLOR UR: YELLOW
EXT PREGNANCY TEST URINE: POSITIVE
EXT. CONTROL: ABNORMAL
GLUCOSE UR STRIP-MCNC: NEGATIVE MG/DL
HGB UR QL STRIP.AUTO: NEGATIVE
KETONES UR STRIP-MCNC: NEGATIVE MG/DL
LEUKOCYTE ESTERASE UR QL STRIP: ABNORMAL
NITRITE UR QL STRIP: NEGATIVE
NON-SQ EPI CELLS URNS QL MICRO: ABNORMAL /HPF
PH UR STRIP.AUTO: 6 [PH] (ref 4.5–8)
PROT UR STRIP-MCNC: NEGATIVE MG/DL
RBC #/AREA URNS AUTO: ABNORMAL /HPF
RH BLD: POSITIVE
SP GR UR STRIP.AUTO: 1.02 (ref 1–1.03)
SPECIMEN EXPIRATION DATE: NORMAL
UROBILINOGEN UR QL STRIP.AUTO: 1 E.U./DL
WBC #/AREA URNS AUTO: ABNORMAL /HPF

## 2024-10-22 PROCEDURE — 81025 URINE PREGNANCY TEST: CPT

## 2024-10-22 PROCEDURE — 84702 CHORIONIC GONADOTROPIN TEST: CPT

## 2024-10-22 PROCEDURE — 86901 BLOOD TYPING SEROLOGIC RH(D): CPT

## 2024-10-22 PROCEDURE — 87077 CULTURE AEROBIC IDENTIFY: CPT

## 2024-10-22 PROCEDURE — 87086 URINE CULTURE/COLONY COUNT: CPT

## 2024-10-22 PROCEDURE — 81001 URINALYSIS AUTO W/SCOPE: CPT

## 2024-10-22 PROCEDURE — 87186 SC STD MICRODIL/AGAR DIL: CPT

## 2024-10-22 PROCEDURE — 99284 EMERGENCY DEPT VISIT MOD MDM: CPT

## 2024-10-22 PROCEDURE — 36415 COLL VENOUS BLD VENIPUNCTURE: CPT

## 2024-10-22 PROCEDURE — 86850 RBC ANTIBODY SCREEN: CPT

## 2024-10-22 PROCEDURE — 86900 BLOOD TYPING SEROLOGIC ABO: CPT

## 2024-10-22 PROCEDURE — 81514 NFCT DS BV&VAGINITIS DNA ALG: CPT

## 2024-10-22 PROCEDURE — 76801 OB US < 14 WKS SINGLE FETUS: CPT

## 2024-10-22 PROCEDURE — 87591 N.GONORRHOEAE DNA AMP PROB: CPT

## 2024-10-22 PROCEDURE — 87491 CHLMYD TRACH DNA AMP PROBE: CPT

## 2024-10-22 NOTE — DISCHARGE INSTRUCTIONS
US shows intrauterine gestation about 5 weeks 5 days. Estimated date of delivery is 6/19/25    You were tested for gonorrhea, chlamydia, bacterial vaginosis, yeast infection and trichomonas. We will call you if any results are positive and need treatment    Schedule follow up with OB

## 2024-10-22 NOTE — ED PROVIDER NOTES
Time reflects when diagnosis was documented in both MDM as applicable and the Disposition within this note       Time User Action Codes Description Comment    10/22/2024  3:45 PM Mati Fu Add [O26.899,  R10.2] Pelvic pain during pregnancy     10/22/2024  3:45 PM Mati Fu Add [N89.8] Vaginal discharge           ED Disposition       ED Disposition   Discharge    Condition   Stable    Date/Time   Tue Oct 22, 2024  3:45 PM    Comment   Merline Madden discharge to home/self care.                   Assessment & Plan       Medical Decision Making  Pt if Rh positive per chart review, does not require rhogam. UA non infectious appearing. US normal. Pt tested for gonorrhea, chlamydia, vaginosis panel- will call if any results are positive. Follow up with OB    I have discussed the plan to discharge pt from ED. The patient was discharged in stable condition.  Patient ambulated off the department.  Extensive return to emergency department precautions were discussed.  Follow up with appropriate providers including primary care physician was discussed.  Patient and/or their  primary decision maker expressed understanding.  Patient remained stable during entire emergency department stay.      Amount and/or Complexity of Data Reviewed  Labs: ordered. Decision-making details documented in ED Course.  Radiology: ordered. Decision-making details documented in ED Course.        ED Course as of 10/22/24 1614   Tue Oct 22, 2024   1540 US OB < 14 weeks single or first gestation level 1  Single intrauterine gestation with cardiac activity at 5 weeks 5 days (range +/- 4 days).     FAHEEM of 06/19/2025.     1607 Rh positive from labs on 8/3       Medications - No data to display    ED Risk Strat Scores                           SBIRT 22yo+      Flowsheet Row Most Recent Value   Initial Alcohol Screen: US AUDIT-C     1. How often do you have a drink containing alcohol? 0 Filed at: 10/22/2024 1411   2. How many drinks  containing alcohol do you have on a typical day you are drinking?  0 Filed at: 10/22/2024 141   3a. Male UNDER 65: How often do you have five or more drinks on one occasion? 0 Filed at: 10/22/2024 1411   3b. FEMALE Any Age, or MALE 65+: How often do you have 4 or more drinks on one occassion? 0 Filed at: 10/22/2024 1411   Audit-C Score 0 Filed at: 10/22/2024 141   ZELALEM: How many times in the past year have you...    Used an illegal drug or used a prescription medication for non-medical reasons? Never Filed at: 10/22/2024 1411                            History of Present Illness       Chief Complaint   Patient presents with    Vaginal Discharge     Pt c/o brown discharge- had positive pregnancy test four days ago. Pt reports burning with urination, denies abd pain.        History reviewed. No pertinent past medical history.   Past Surgical History:   Procedure Laterality Date    BARIATRIC SURGERY  2023      History reviewed. No pertinent family history.   Social History     Tobacco Use    Smoking status: Former     Current packs/day: 0.25     Types: Cigarettes    Smokeless tobacco: Never   Vaping Use    Vaping status: Never Used   Substance Use Topics    Alcohol use: Never    Drug use: Never      E-Cigarette/Vaping    E-Cigarette Use Never User       E-Cigarette/Vaping Substances      I have reviewed and agree with the history as documented.     29 YOF  presents today with vaginal discharge and pelvic pain. Reports the discharge started 2 days ago. Dark brown in color, no bright red. Reports the pelvic pain is crampy. Also admitting to dysuria and urinary frequency. LMP 9/8.         Review of Systems   Constitutional:  Negative for chills and fever.   Gastrointestinal:  Positive for nausea. Negative for vomiting.   Genitourinary:  Positive for dysuria, pelvic pain, vaginal bleeding and vaginal discharge. Negative for hematuria.   All other systems reviewed and are negative.          Objective       ED  Triage Vitals [10/22/24 1329]   Temperature Pulse Blood Pressure Respirations SpO2 Patient Position - Orthostatic VS   97.9 °F (36.6 °C) 81 118/60 18 100 % Sitting      Temp Source Heart Rate Source BP Location FiO2 (%) Pain Score    Oral Monitor Right arm -- No Pain      Vitals      Date and Time Temp Pulse SpO2 Resp BP Pain Score FACES Pain Rating User   10/22/24 1535 -- 78 100 % 18 102/57 -- -- CF   10/22/24 1329 97.9 °F (36.6 °C) 81 100 % 18 118/60 No Pain -- LB            Physical Exam  Vitals and nursing note reviewed.   Constitutional:       General: She is not in acute distress.     Appearance: Normal appearance. She is well-developed. She is not ill-appearing.   HENT:      Head: Normocephalic and atraumatic.   Eyes:      Conjunctiva/sclera: Conjunctivae normal.   Cardiovascular:      Rate and Rhythm: Normal rate.   Pulmonary:      Effort: Pulmonary effort is normal.   Abdominal:      Palpations: Abdomen is soft.      Tenderness: There is no abdominal tenderness.   Musculoskeletal:         General: No swelling. Normal range of motion.      Cervical back: Normal range of motion and neck supple.   Skin:     General: Skin is warm and dry.   Neurological:      Mental Status: She is alert.   Psychiatric:         Mood and Affect: Mood normal.         Behavior: Behavior normal.         Results Reviewed       Procedure Component Value Units Date/Time    Urine Microscopic [[id number]]  (Abnormal) Collected: 10/22/24 1358    Lab Status: Final result Specimen: Urine, Clean Catch Updated: 10/22/24 1601     RBC, UA None Seen /hpf      WBC, UA 2-4 /hpf      Epithelial Cells Occasional /hpf      Bacteria, UA Occasional /hpf     hCG, quantitative [[id number]] Collected: 10/22/24 1533    Lab Status: In process Specimen: Blood from Arm, Right Updated: 10/22/24 1538    Urine culture [[id number]] Collected: 10/22/24 1358    Lab Status: In process Specimen: Urine, Clean Catch Updated: 10/22/24 1406    Chlamydia/GC amplified DNA by  PCR [[id number]] Collected: 10/22/24 1401    Lab Status: In process Specimen: Urine, Other Updated: 10/22/24 1406    Molecular Vaginal Panel [[id number]] Collected: 10/22/24 1401    Lab Status: In process Specimen: Genital from Vaginal Updated: 10/22/24 1405    POCT pregnancy, urine [[id number]]  (Abnormal) Resulted: 10/22/24 1359    Lab Status: Final result Updated: 10/22/24 1359     EXT Preg Test, Ur Positive     Control Valid    Urine Macroscopic, POC [[id number]]  (Abnormal) Collected: 10/22/24 1358    Lab Status: Final result Specimen: Urine Updated: 10/22/24 1359     Color, UA Yellow     Clarity, UA Clear     pH, UA 6.0     Leukocytes, UA Trace     Nitrite, UA Negative     Protein, UA Negative mg/dl      Glucose, UA Negative mg/dl      Ketones, UA Negative mg/dl      Urobilinogen, UA 1.0 E.U./dl      Bilirubin, UA Negative     Occult Blood, UA Negative     Specific Gravity, UA 1.020    Narrative:      CLINITEK RESULT            US OB < 14 weeks single or first gestation level 1   Final Interpretation by Dionicio Guajardo MD (10/22 1537)      Single intrauterine gestation with cardiac activity at 5 weeks 5 days (range +/- 4 days).      FAHEEM of 06/19/2025.            Workstation performed: [id number]             Procedures    ED Medication and Procedure Management   None     Patient's Medications    No medications on file     No discharge procedures on file.  ED SEPSIS DOCUMENTATION   Time reflects when diagnosis was documented in both MDM as applicable and the Disposition within this note       Time User Action Codes Description Comment    10/22/2024  3:45 PM Mati Fu Add [O26.899,  R10.2] Pelvic pain during pregnancy     10/22/2024  3:45 PM Mati Fu Add [N89.8] Vaginal discharge                  Mati Fu PA-C  10/22/24 4177

## 2024-10-23 LAB
C GLABRATA DNA VAG QL NAA+PROBE: NEGATIVE
C KRUSEI DNA VAG QL NAA+PROBE: NEGATIVE
C TRACH DNA SPEC QL NAA+PROBE: NEGATIVE
CANDIDA SP 6 PNL VAG NAA+PROBE: NEGATIVE
N GONORRHOEA DNA SPEC QL NAA+PROBE: NEGATIVE
T VAGINALIS DNA VAG QL NAA+PROBE: POSITIVE
VAGINOSIS/ITIS DNA PNL VAG PROBE+SIG AMP: POSITIVE

## 2024-10-24 LAB
BACTERIA UR CULT: ABNORMAL

## 2024-11-04 ENCOUNTER — ULTRASOUND (OUTPATIENT)
Dept: OBGYN CLINIC | Facility: CLINIC | Age: 29
End: 2024-11-04
Payer: MEDICARE

## 2024-11-04 VITALS
OXYGEN SATURATION: 100 % | DIASTOLIC BLOOD PRESSURE: 62 MMHG | BODY MASS INDEX: 31.76 KG/M2 | SYSTOLIC BLOOD PRESSURE: 110 MMHG | HEART RATE: 83 BPM | WEIGHT: 185 LBS

## 2024-11-04 DIAGNOSIS — O36.80X1 ENCOUNTER TO DETERMINE FETAL VIABILITY OF PREGNANCY, FETUS 1: Primary | ICD-10-CM

## 2024-11-04 PROBLEM — R87.810 ASCUS WITH POSITIVE HIGH RISK HPV CERVICAL: Status: ACTIVE | Noted: 2019-07-17

## 2024-11-04 PROBLEM — O36.4XX0 IUFD AT 20 WEEKS OR MORE OF GESTATION: Status: ACTIVE | Noted: 2024-08-03

## 2024-11-04 PROBLEM — R87.610 ASCUS WITH POSITIVE HIGH RISK HPV CERVICAL: Status: ACTIVE | Noted: 2019-07-17

## 2024-11-04 PROBLEM — O99.840 PREVIOUS BARIATRIC SURGERY COMPLICATING PREGNANCY: Status: ACTIVE | Noted: 2023-12-26

## 2024-11-04 PROBLEM — A60.04 HERPES SIMPLEX VULVOVAGINITIS: Status: ACTIVE | Noted: 2022-08-18

## 2024-11-04 PROCEDURE — 76817 TRANSVAGINAL US OBSTETRIC: CPT | Performed by: OBSTETRICS & GYNECOLOGY

## 2024-11-04 PROCEDURE — 99204 OFFICE O/P NEW MOD 45 MIN: CPT | Performed by: OBSTETRICS & GYNECOLOGY

## 2024-11-04 RX ORDER — CEPHALEXIN 500 MG/1
500 CAPSULE ORAL 3 TIMES DAILY
COMMUNITY
Start: 2024-08-06

## 2024-11-04 RX ORDER — ALBUTEROL SULFATE 90 UG/1
2 INHALANT RESPIRATORY (INHALATION) EVERY 4 HOURS PRN
COMMUNITY
Start: 2024-05-31

## 2024-11-04 NOTE — PROGRESS NOTES
Subjective    Patient is a 30 yo  who presents today for a dating ultrasound. She reports her LMP as 24. Her history is notable however for a recent  in 2024 of an IUFD at 40w1d. A cord prolapse was also noted during the labor course, and thought to be a possible cause of the demise at the time. The delivery was complicated by a 10 minute shoulder dystocia. Her medical history is also notable for prior gastric sleeve surgery, as well as HSV.    OB History          2    Para   1    Term   1            AB        Living             SAB        IAB        Ectopic        Multiple        Live Births                            Objective    Vitals:    24 1055   BP: 110/62   BP Location: Left arm   Patient Position: Sitting   Cuff Size: Standard   Pulse: 83   SpO2: 100%   Weight: 83.9 kg (185 lb)        Physical Exam  Constitutional:       Appearance: She is well-developed.   Cardiovascular:      Rate and Rhythm: Normal rate.   Pulmonary:      Effort: Pulmonary effort is normal. No respiratory distress.   Abdominal:      Palpations: Abdomen is soft.      Tenderness: There is no abdominal tenderness.   Skin:     General: Skin is warm and dry.          Assessment    Patient Active Problem List   Diagnosis    Herpes simplex vulvovaginitis    ASCUS with positive high risk HPV cervical    IUFD at 20 weeks or more of gestation    Previous bariatric surgery complicating pregnancy        Plan  - Ultrasound inconsistent with LMP of 24, will used today's ultrasound to establish FAHEEM  - Final FAHEEM 25  - Return for intake and PN1  - Prenatal labs ordered, including UA as patient reports malodorous urine      FIRST TRIMESTER OBSTETRIC ULTRASOUND  2024   Tiburcio Price MD     INDICATION: Amenorrhea, viability  .  COMPARISON: None.     TECHNIQUE:   Transvaginal imaging was performed to assess the gestation, myometrial/endometrial architecture and ovarian parenchymal detail.    The study  includes volumetric sweeps and traditional still imaging technique.      FINDINGS:     A single intrauterine gestation is identified.  Cardiac activity is detected at 137 bpm.      YOLK SAC:  Present and normal in size and appearance.  MEAN CROWN RUMP LENGTH:  10 mm = 7 weeks 0 days   AMNIOTIC FLUID/SAC SHAPE:  Within expected normal range.     UTERUS/ADNEXA:   No adnexal mass or pathologic cyst.  No free fluid identified.     IMPRESSION:     Single intrauterine pregnancy of 7 weeks 0 gestational age.  Fetal cardiac activity detected.  No adnexal masses seen.  Ultrasound inconsistent with LMP of 9/9/24, will used today's ultrasound to establish FAHEEM  Final FAHEEM 6/23/25

## 2024-11-11 ENCOUNTER — INITIAL PRENATAL (OUTPATIENT)
Dept: OBGYN CLINIC | Facility: CLINIC | Age: 29
End: 2024-11-11
Payer: MEDICARE

## 2024-11-11 VITALS
DIASTOLIC BLOOD PRESSURE: 60 MMHG | SYSTOLIC BLOOD PRESSURE: 110 MMHG | HEIGHT: 64 IN | WEIGHT: 186 LBS | BODY MASS INDEX: 31.76 KG/M2

## 2024-11-11 DIAGNOSIS — Z98.84 HISTORY OF BARIATRIC SURGERY: ICD-10-CM

## 2024-11-11 DIAGNOSIS — Z34.81 SUPERVISION OF NORMAL INTRAUTERINE PREGNANCY IN MULTIGRAVIDA IN FIRST TRIMESTER: Primary | ICD-10-CM

## 2024-11-11 PROCEDURE — T1001 NURSING ASSESSMENT/EVALUATN: HCPCS

## 2024-11-11 NOTE — PATIENT INSTRUCTIONS
Congratulations on your pregnancy!  We thank you for allowing us to participate in your care.    NEXT STEPS    Go to the lab to have your prenatal blood work competed, if you have not already done so.  We ask that you have this blood work done prior to your first routine prenatal appointment.  There is a listing of [facility] and locations in your prenatal folder. You may also visit [email or web address] or call [phone].   Please be aware that some insurance companies may require you to go to a specific lab (ex. [facility] or [facility]). You can verify this by contacting your insurance company.   A referral was placed to Maternal Fetal Medicine for an ultrasound and or genetic testing.  You may have already scheduled or have had this appointment.  If not, please call their office to schedule.  Based on the referral placed by our office, they will know how to schedule you appropriately.    The phone number for Maternal Fetal Medicine is [phone]. For additional detailed contact information for Maternal Fetal Medicine, please refer to the prenatal folder provided to you by our office.   Return to our office for your first routine prenatal visit.   Some offices have multiple locations. Always check the address of your appointment to ensure you are going to the correct office.   If you experience any problems or concerns, call the office directly.   Remember to only use [employer] for none urgent concerns or questions.  Our doctors deliver at [facility] in [place]. The address is provided below.     [place] [id number]    Click on the links below to review our Pregnancy Essential Guide.  [facility] Pregnancy Essentials Guide  [facility] ([email or web address])     Click on the link below to review [facility] locations.  [facility] Lab Locations    [employer] resource  [employer] is a tool to connect you to community resources you may  need.    Thank you,  Becca SHELTON LPN  OB Nurse Navigator

## 2024-11-11 NOTE — PROGRESS NOTES
OB INTAKE INTERVIEW 2024    Used [employer] interpretor [id number]    Patient is 29 y.o. who presents for OB intake at 8w0d.  She is accompanied by her significant other during this encounter.  The father of her baby (Lewis Hunter) is involved in the pregnancy and he is 44 years old.      Patient's last menstrual period was 2024.  Ultrasound: Measured 7 weeks 0 days on 2024  Estimated Date of Delivery: 25 changed by dating ultrasound.    Signs/Symptoms of Pregnancy  Current pregnancy symptoms: nausea  Headaches no  Cramping no  Spotting no  PICA cravings no    Diabetes-  Body mass index is 32.43 kg/m².  If patient has 1 or more, please order early 1 hour GTT  History of GDM no  BMI >35 no  History of PCOS or current metformin use no  History of LGA/macrosomic infant (4000g/9lbs) no    If patient has 2 or more, please order early 1 hour GTT  BMI>30 YES  AMA no  First degree relative with type 2 diabetes no  History of chronic HTN, hyperlipidemia, elevated A1C no  High risk race (, , ,  or ) YES    Hypertension- if you answer yes to any of the following, please order baseline preeclampsia labs (cbc, comprehensive metabolic panel, urine protein creatinine ratio, uric acid)  History of of chronic HTN no  History of gestational HTN no  History of preeclampsia, eclampsia, or HELLP syndrome no  History of diabetes no  History of lupus,sjogrens syndrome, kidney disease no    Thyroid- if yes order TSH with reflex T4  History of thyroid disease no    Bleeding Disorder or Hx of DVT-patient or first degree relative with history of. Order the following if not done previously.   (Factor V, antithrombin III, prothrombin gene mutation, protein C and S Ag, lupus anticoagulant, anticardiolipin, beta-2 glycoprotein)   no    OB/GYN-  History of abnormal pap smear YES-2019 ASCUS positive other HPV     Date of last pap smear  1/10/2024  History of HPV YES  History of Herpes/HSV YES  History of other STI (gonorrhea, chlamydia, trich) no  History of prior  YES  History of prior  no  History of  delivery prior to 36 weeks 6 days no  History of blood transfusion no  Ok for blood transfusion  Yes    Substance screening-   History of tobacco use no  Currently using tobacco no  Substance Use Screen Level:  N/A    MRSA Screening-   Does the pt have a hx of MRSA? no    Immunizations:  Influenza vaccine given this season: NO   History of Varicella or Vaccination: YES   Discussed Tdap vaccine:  YES  Discussed COVID Vaccine:  YES    Genetic/MFM-  Do you or your partner have a history of any of the following in yourselves or first degree relatives?  Cystic fibrosis no  Spinal muscular atrophy no  Hemoglobinopathy/Sickle Cell/Thalassemia no  Fragile X Intellectual Disability no    If yes, discuss Carrier Screening and recommend consultation with MFM/Genetic Counseling and place specific [facility] Referral for.    If no, discuss Carrier Screening being completed once in a lifetime as a standard of care lab test. Place orders for Cystic Fibrosis Gene Test (RXG718) and Spinal Muscular Atrophy DNA ([id number]).  Patient was informed that prior authorization needs to be completed for these tests and this may take 7-10 business days.  Patient does not desire testing for Cystic Fibrosis and Spinal Muscular Atrophy.    Appointment for Nuchal Translucency Ultrasound at [facility] is scheduled for 2024.    Interview education  [facility] Pregnancy Essentials Book reviewed, discussed and attached to their AVS:  YES    Nurse/Family Partnership-patient may qualify NO; referral placed No     Prenatal lab work scripts YES    Extra labs ordered: Hgb Fractionation Cascade, 1 hour GTT, and bariatric labs.    Aspirin/Preeclampsia Screen    Risk Level Risk Factor Recommendation   LOW Prior Uncomplicated full-term delivery no No Aspirin recommendation         MODERATE Nulliparity no Recommend low-dose aspirin if     BMI>30 YES 2 or more moderate risk factors    Family History Preeclampsia (mother/sister) no     35yr old or greater no     Black Race, Concern for SDOH/Low Socioeconomic no     IVF Pregnancy  no     Personal History Risks (low birth weight, prior adverse preg outcome, >10yr preg interval) YES-IUFD         HIGH History of Preeclampsia no Recommend low-dose aspirin if     Multifetal gestation no 1 or more high risk factors    Chronic HTN no     Type 1 or 2 Diabetes no     Renal Disease no     Autoimmune Disease  no      Contraindications to ASA therapy:  NSAID/ ASA allergy: no  Nasal polyps: no  Asthma with history of ASA induced bronchospasm: no  Relative contraindications:  History of GI bleed: no  Active peptic ulcer disease: no  Severe hepatic dysfunction: no    Patient does meet recommendation to take ASA 162mg during this pregnancy from 12-36 wks to lower her risk of preeclampsia.  Instructions given and patient verbalized understanding.    Mental Health:  History of depression: no  History of anxiety: no  EPDS Screen:  Negative   Score:  0    Dental Exam:  Last dental exam within the past 6 months: Yes    The patient has a history now or in prior pregnancy notable for:  IUFD with first pregnancy 2024 possibly do to cord prolapse also 10 minute shoulder dystocia .  Short interval pregnancy.     Details that I feel the provider should be aware of: Patient has a history of HSV and gastric sleeve surgery.  Reports she was able to 1 hour GTT with prior pregnancy.   Patient would like a  delivery for this pregnancy.    PN1 visit scheduled. The patient was oriented to our practice and the navigator role.  Reviewed delivering physicians and [facility] for delivery. All questions were answered.    Interviewed by: MARY TOBAR LPN

## 2024-11-27 ENCOUNTER — TELEMEDICINE (OUTPATIENT)
Dept: OTHER | Facility: HOSPITAL | Age: 29
End: 2024-11-27
Payer: MEDICARE

## 2024-11-27 DIAGNOSIS — B34.9 VIRAL ILLNESS: Primary | ICD-10-CM

## 2024-11-27 DIAGNOSIS — Z3A.10 10 WEEKS GESTATION OF PREGNANCY: ICD-10-CM

## 2024-11-27 PROCEDURE — 99213 OFFICE O/P EST LOW 20 MIN: CPT | Performed by: PHYSICIAN ASSISTANT

## 2024-11-27 NOTE — LETTER
November 27, 2024     Patient: Merline Madden  YOB: 1995  Date of Visit: 11/27/2024      To Whom it May Concern:    Merline Madden is under my professional care. Merline was seen by virtual visit on 11/27/2024. Merline may return to work on 11/28/2024 .    If you have any questions or concerns, please don't hesitate to call.         Sincerely,          Danielle Lee Seiple, PA-C        CC: No Recipients

## 2024-11-27 NOTE — PROGRESS NOTES
Virtual Regular Visit  Name: Merline Madden      : 1995      MRN: [id number]  Encounter Provider: Danielle Lee Seiple, PA-C  Encounter Date: 2024   Encounter department: VIRTUAL CARE       Verification of patient location:  Patient is located at Home in the following state in which I hold an active license [place] :  Assessment & Plan  Viral illness         10 weeks gestation of pregnancy         Merline presented for virtual visit with viral illness in the setting of pregnancy, wondering what medications she could take.  Discussed viral illness and course.   Recommend tyelnol as needed, advising not to exceed 3g in a 24 hour period.  Encourage good hydration and nutrition. Encourage coughing into the elbow instead of the hand.   Washing hands frequently with warm water and soap may help stop spread of infection.  Follow up with urgent care of PCP with development of body aches, fever, etc.   Note provided to return to work tomorrow.   Patient in agreement with plan.     Encounter provider Danielle Lee Seiple, PA-C    The patient was identified by name and date of birth. Merline Madden was informed that this is a telemedicine visit and that the visit is being conducted through the Epic Embedded platform. She agrees to proceed..  My office door was closed. No one else was in the room.  She acknowledged consent and understanding of privacy and security of the video platform. The patient has agreed to participate and understands they can discontinue the visit at any time.    Patient is aware this is a billable service.     History was obtained from: History obtained from: patient and a .  History of Present Illness     Merline presents for virtual visit and a  helps provide the history. She has a sore throat x 1 day.   Also with cough and congestion x 1 day.   Normal appetite and drinking. Normal urine output and bowel movements.   Denies headache,  abdominal pain, nausea, fever, ear pain, body aches.       Review of Systems   Constitutional:  Negative for activity change, appetite change, fatigue and fever.   HENT:  Positive for congestion and sore throat. Negative for ear pain, rhinorrhea, sinus pressure, sinus pain, sneezing and trouble swallowing.    Eyes:  Negative for discharge and redness.   Respiratory:  Negative for cough, shortness of breath and wheezing.    Gastrointestinal:  Negative for abdominal pain, constipation, diarrhea, nausea and vomiting.   Skin:  Negative for rash.       Objective   LMP 09/09/2024     Physical Exam  Constitutional:       General: She is awake.      Appearance: Normal appearance. She is well-developed, well-groomed and normal weight. She is ill-appearing.   HENT:      Head: Normocephalic.      Right Ear: External ear normal.      Left Ear: External ear normal.      Nose: Congestion present. No rhinorrhea.      Mouth/Throat:      Lips: Pink. No lesions.      Mouth: Mucous membranes are moist.   Eyes:      General: Lids are normal.   Pulmonary:      Effort: Pulmonary effort is normal. No respiratory distress.   Lymphadenopathy:      Head:      Right side of head: No tonsillar adenopathy.      Left side of head: No tonsillar adenopathy.      Comments: No lymphadenopathy reported by patient   Skin:     Coloration: Skin is not pale.      Findings: No rash.   Neurological:      Mental Status: She is alert.   Psychiatric:         Attention and Perception: Attention normal.         Speech: Speech normal.         Behavior: Behavior is cooperative.         Visit Time  Total Visit Duration: 9 minutes not including the time spent for establishing the audio/video connection.

## 2024-12-02 ENCOUNTER — APPOINTMENT (OUTPATIENT)
Dept: LAB | Facility: HOSPITAL | Age: 29
End: 2024-12-02
Payer: MEDICARE

## 2024-12-02 DIAGNOSIS — Z34.81 SUPERVISION OF NORMAL INTRAUTERINE PREGNANCY IN MULTIGRAVIDA IN FIRST TRIMESTER: ICD-10-CM

## 2024-12-02 DIAGNOSIS — O36.80X1 ENCOUNTER TO DETERMINE FETAL VIABILITY OF PREGNANCY, FETUS 1: ICD-10-CM

## 2024-12-02 DIAGNOSIS — Z98.84 HISTORY OF BARIATRIC SURGERY: ICD-10-CM

## 2024-12-02 LAB
ABO GROUP BLD: NORMAL
BACTERIA UR QL AUTO: ABNORMAL /HPF
BASOPHILS # BLD AUTO: 0.03 THOUSANDS/ΜL (ref 0–0.1)
BASOPHILS NFR BLD AUTO: 0 % (ref 0–1)
BILIRUB UR QL STRIP: NEGATIVE
BLD GP AB SCN SERPL QL: NEGATIVE
CLARITY UR: CLEAR
COLOR UR: ABNORMAL
EOSINOPHIL # BLD AUTO: 0.18 THOUSAND/ΜL (ref 0–0.61)
EOSINOPHIL NFR BLD AUTO: 2 % (ref 0–6)
ERYTHROCYTE [DISTWIDTH] IN BLOOD BY AUTOMATED COUNT: 14 % (ref 11.6–15.1)
EST. AVERAGE GLUCOSE BLD GHB EST-MCNC: 100 MG/DL
GLUCOSE 1H P 50 G GLC PO SERPL-MCNC: 95 MG/DL (ref 70–134)
GLUCOSE UR STRIP-MCNC: NEGATIVE MG/DL
HBA1C MFR BLD: 5.1 %
HCT VFR BLD AUTO: 37.5 % (ref 34.8–46.1)
HGB BLD-MCNC: 12.2 G/DL (ref 11.5–15.4)
HGB UR QL STRIP.AUTO: NEGATIVE
IMM GRANULOCYTES # BLD AUTO: 0.03 THOUSAND/UL (ref 0–0.2)
IMM GRANULOCYTES NFR BLD AUTO: 0 % (ref 0–2)
KETONES UR STRIP-MCNC: NEGATIVE MG/DL
LEUKOCYTE ESTERASE UR QL STRIP: ABNORMAL
LYMPHOCYTES # BLD AUTO: 1.52 THOUSANDS/ΜL (ref 0.6–4.47)
LYMPHOCYTES NFR BLD AUTO: 19 % (ref 14–44)
MCH RBC QN AUTO: 27.6 PG (ref 26.8–34.3)
MCHC RBC AUTO-ENTMCNC: 32.5 G/DL (ref 31.4–37.4)
MCV RBC AUTO: 85 FL (ref 82–98)
MONOCYTES # BLD AUTO: 0.53 THOUSAND/ΜL (ref 0.17–1.22)
MONOCYTES NFR BLD AUTO: 7 % (ref 4–12)
MUCOUS THREADS UR QL AUTO: ABNORMAL
NEUTROPHILS # BLD AUTO: 5.82 THOUSANDS/ΜL (ref 1.85–7.62)
NEUTS SEG NFR BLD AUTO: 72 % (ref 43–75)
NITRITE UR QL STRIP: NEGATIVE
NON-SQ EPI CELLS URNS QL MICRO: ABNORMAL /HPF
NRBC BLD AUTO-RTO: 0 /100 WBCS
PH UR STRIP.AUTO: 6.5 [PH]
PLATELET # BLD AUTO: 288 THOUSANDS/UL (ref 149–390)
PMV BLD AUTO: 10.5 FL (ref 8.9–12.7)
PROT UR STRIP-MCNC: ABNORMAL MG/DL
RBC # BLD AUTO: 4.42 MILLION/UL (ref 3.81–5.12)
RBC #/AREA URNS AUTO: ABNORMAL /HPF
RH BLD: POSITIVE
RUBV IGG SERPL IA-ACNC: <10 IU/ML
SP GR UR STRIP.AUTO: 1.03 (ref 1–1.03)
SPECIMEN EXPIRATION DATE: NORMAL
TSH SERPL DL<=0.05 MIU/L-ACNC: 0.85 UIU/ML (ref 0.45–4.5)
UROBILINOGEN UR STRIP-ACNC: <2 MG/DL
VIT B12 SERPL-MCNC: 428 PG/ML (ref 180–914)
WBC # BLD AUTO: 8.11 THOUSAND/UL (ref 4.31–10.16)
WBC #/AREA URNS AUTO: ABNORMAL /HPF

## 2024-12-02 PROCEDURE — 86850 RBC ANTIBODY SCREEN: CPT

## 2024-12-02 PROCEDURE — 84590 ASSAY OF VITAMIN A: CPT

## 2024-12-02 PROCEDURE — 84630 ASSAY OF ZINC: CPT

## 2024-12-02 PROCEDURE — 83036 HEMOGLOBIN GLYCOSYLATED A1C: CPT

## 2024-12-02 PROCEDURE — 85025 COMPLETE CBC W/AUTO DIFF WBC: CPT

## 2024-12-02 PROCEDURE — 86706 HEP B SURFACE ANTIBODY: CPT

## 2024-12-02 PROCEDURE — 36415 COLL VENOUS BLD VENIPUNCTURE: CPT

## 2024-12-02 PROCEDURE — 87077 CULTURE AEROBIC IDENTIFY: CPT

## 2024-12-02 PROCEDURE — 86900 BLOOD TYPING SEROLOGIC ABO: CPT

## 2024-12-02 PROCEDURE — 86780 TREPONEMA PALLIDUM: CPT

## 2024-12-02 PROCEDURE — 87340 HEPATITIS B SURFACE AG IA: CPT

## 2024-12-02 PROCEDURE — 87186 SC STD MICRODIL/AGAR DIL: CPT

## 2024-12-02 PROCEDURE — 82950 GLUCOSE TEST: CPT

## 2024-12-02 PROCEDURE — 86901 BLOOD TYPING SEROLOGIC RH(D): CPT

## 2024-12-02 PROCEDURE — 83020 HEMOGLOBIN ELECTROPHORESIS: CPT

## 2024-12-02 PROCEDURE — 82607 VITAMIN B-12: CPT

## 2024-12-02 PROCEDURE — 86803 HEPATITIS C AB TEST: CPT

## 2024-12-02 PROCEDURE — 86762 RUBELLA ANTIBODY: CPT

## 2024-12-02 PROCEDURE — 84425 ASSAY OF VITAMIN B-1: CPT

## 2024-12-02 PROCEDURE — 84443 ASSAY THYROID STIM HORMONE: CPT

## 2024-12-02 PROCEDURE — 82306 VITAMIN D 25 HYDROXY: CPT

## 2024-12-02 PROCEDURE — 87086 URINE CULTURE/COLONY COUNT: CPT

## 2024-12-03 LAB
HBV SURFACE AB SER-ACNC: 4.09 MIU/ML
HBV SURFACE AG SER QL: NORMAL
HCV AB SER QL: REACTIVE
TREPONEMA PALLIDUM IGG+IGM AB [PRESENCE] IN SERUM OR PLASMA BY IMMUNOASSAY: NORMAL

## 2024-12-04 ENCOUNTER — INITIAL PRENATAL (OUTPATIENT)
Dept: OBGYN CLINIC | Facility: CLINIC | Age: 29
End: 2024-12-04
Payer: MEDICARE

## 2024-12-04 VITALS
HEART RATE: 78 BPM | WEIGHT: 186 LBS | DIASTOLIC BLOOD PRESSURE: 56 MMHG | HEIGHT: 64 IN | BODY MASS INDEX: 31.76 KG/M2 | SYSTOLIC BLOOD PRESSURE: 100 MMHG

## 2024-12-04 DIAGNOSIS — O36.4XX0 IUFD AT 20 WEEKS OR MORE OF GESTATION: ICD-10-CM

## 2024-12-04 DIAGNOSIS — O09.91 PREGNANCY, HIGH-RISK, FIRST TRIMESTER: Primary | ICD-10-CM

## 2024-12-04 DIAGNOSIS — Z3A.11 11 WEEKS GESTATION OF PREGNANCY: ICD-10-CM

## 2024-12-04 DIAGNOSIS — R87.610 ASCUS WITH POSITIVE HIGH RISK HPV CERVICAL: ICD-10-CM

## 2024-12-04 DIAGNOSIS — O09.899 RUBELLA NON-IMMUNE STATUS, ANTEPARTUM: ICD-10-CM

## 2024-12-04 DIAGNOSIS — R76.8 HSV-2 SEROPOSITIVE: ICD-10-CM

## 2024-12-04 DIAGNOSIS — R76.8 HEPATITIS C ANTIBODY POSITIVE IN BLOOD: ICD-10-CM

## 2024-12-04 DIAGNOSIS — O23.41 URINARY TRACT INFECTION IN MOTHER DURING FIRST TRIMESTER OF PREGNANCY: ICD-10-CM

## 2024-12-04 DIAGNOSIS — Z28.39 RUBELLA NON-IMMUNE STATUS, ANTEPARTUM: ICD-10-CM

## 2024-12-04 DIAGNOSIS — R87.810 ASCUS WITH POSITIVE HIGH RISK HPV CERVICAL: ICD-10-CM

## 2024-12-04 PROBLEM — N88.9 LESION OF CERVIX: Status: RESOLVED | Noted: 2024-01-10 | Resolved: 2024-12-04

## 2024-12-04 PROBLEM — N88.9 LESION OF CERVIX: Status: ACTIVE | Noted: 2024-01-10

## 2024-12-04 LAB
BACTERIA UR CULT: ABNORMAL
ZINC SERPL-MCNC: 68 UG/DL (ref 44–115)

## 2024-12-04 PROCEDURE — 99213 OFFICE O/P EST LOW 20 MIN: CPT | Performed by: OBSTETRICS & GYNECOLOGY

## 2024-12-04 PROCEDURE — 87591 N.GONORRHOEAE DNA AMP PROB: CPT | Performed by: OBSTETRICS & GYNECOLOGY

## 2024-12-04 PROCEDURE — 87491 CHLMYD TRACH DNA AMP PROBE: CPT | Performed by: OBSTETRICS & GYNECOLOGY

## 2024-12-04 PROCEDURE — 87660 TRICHOMONAS VAGIN DIR PROBE: CPT | Performed by: OBSTETRICS & GYNECOLOGY

## 2024-12-04 PROCEDURE — 87510 GARDNER VAG DNA DIR PROBE: CPT | Performed by: OBSTETRICS & GYNECOLOGY

## 2024-12-04 PROCEDURE — 87480 CANDIDA DNA DIR PROBE: CPT | Performed by: OBSTETRICS & GYNECOLOGY

## 2024-12-04 RX ORDER — ACETAMINOPHEN 160 MG/5ML
15 LIQUID ORAL EVERY 4 HOURS PRN
COMMUNITY

## 2024-12-04 RX ORDER — VALACYCLOVIR HYDROCHLORIDE 1 G/1
1000 TABLET, FILM COATED ORAL 2 TIMES DAILY
Qty: 14 TABLET | Refills: 0 | Status: SHIPPED | OUTPATIENT
Start: 2024-12-04 | End: 2024-12-11

## 2024-12-04 RX ORDER — CEPHALEXIN 500 MG/1
500 CAPSULE ORAL EVERY 6 HOURS SCHEDULED
Qty: 28 CAPSULE | Refills: 0 | Status: SHIPPED | OUTPATIENT
Start: 2024-12-04 | End: 2024-12-11

## 2024-12-04 RX ORDER — ASPIRIN 81 MG/1
162 TABLET, CHEWABLE ORAL DAILY
Qty: 60 TABLET | Refills: 5 | Status: SHIPPED | OUTPATIENT
Start: 2024-12-04 | End: 2025-06-02

## 2024-12-04 NOTE — PROGRESS NOTES
"[employer]  #[id number] used for this encounter    Assessment & Plan  29 y.o.  at 11w2d presenting for routine prenatal visit.     Problem List       Herpes simplex vulvovaginitis    Overview   1st episode 2022 pos for HSV 2.         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  Delivery complicated by 10 minute shoulder dystocia  Patient desires elective primary  delivery         Previous bariatric surgery complicating pregnancy    Overview   Surgery: Sleeve 8/15/23 (in active weight loss phase)/Fetal growth monthly         HSV-2 seropositive    Overview   Valtrex at 36 weeks [ ]  Reports new HSV lesions, will send Valtrex         11 weeks gestation of pregnancy    Overview   Hepatitis B equivocal, rubella non immune  LDASA  Patient desires elective primary  section, schedule [ ]  MSAFP [ ]  Delivery consent [ ]  Tdap [ ]  GBS [ ]  Contraception [ ]         Hepatitis C antibody positive in blood    Overview   + antibody on prenatal labs  F/u quant  Also had +antibody with negative quant in prior pregnancy         Rubella non-immune status, antepartum    Overview   MMR postpartum         Urinary tract infection in mother during first trimester of pregnancy    Overview   UTI on prenatal labs w/ Proteus  Keflex ordered          Other Visit Diagnoses         Pregnancy, high-risk, first trimester    -  Primary          ____________________________________________________________  Subjective  She reports thick, irritating vaginal discharge and HSV symptoms; Valtrex sent, Affirm collected in addition to GCCT.  She denies contractions, loss of fluid, or vaginal bleeding.     Objective  /56 (BP Location: Left arm, Cuff Size: Large)   Pulse 78   Ht 5' 3.5\" (1.613 m)   Wt 84.4 kg (186 lb)   LMP 2024   BMI 32.43 kg/m²   FHR: 154 " bpm    Physical Exam  Constitutional:       Appearance: She is well-developed.   Genitourinary:      Vulva normal.      Genitourinary Comments: No obvious lesions but labia acutely tender      No vaginal discharge.   Cardiovascular:      Rate and Rhythm: Normal rate.   Pulmonary:      Effort: Pulmonary effort is normal. No respiratory distress.   Abdominal:      Palpations: Abdomen is soft.      Tenderness: There is no abdominal tenderness.   Skin:     General: Skin is warm and dry.          Patient's Active Problem List  Patient Active Problem List   Diagnosis    Herpes simplex vulvovaginitis    ASCUS with positive high risk HPV cervical    IUFD at 20 weeks or more of gestation    Previous bariatric surgery complicating pregnancy    HSV-2 seropositive    11 weeks gestation of pregnancy    Hepatitis C antibody positive in blood    Rubella non-immune status, antepartum    Urinary tract infection in mother during first trimester of pregnancy           Dee Dey MD  12/4/2024  2:32 PM

## 2024-12-05 LAB
25(OH)D3 SERPL-MCNC: 21.8 NG/ML (ref 30–100)
C TRACH DNA SPEC QL NAA+PROBE: NEGATIVE
CANDIDA RRNA VAG QL PROBE: NOT DETECTED
G VAGINALIS RRNA GENITAL QL PROBE: DETECTED
HGB A MFR BLD: 2.1 % (ref 1.8–3.2)
HGB A MFR BLD: 97.9 % (ref 96.4–98.8)
HGB F MFR BLD: 0 % (ref 0–2)
HGB FRACT BLD-IMP: NORMAL
HGB S MFR BLD: 0 %
N GONORRHOEA DNA SPEC QL NAA+PROBE: NEGATIVE
T VAGINALIS RRNA GENITAL QL PROBE: NOT DETECTED
VIT B1 BLD-SCNC: 117.5 NMOL/L (ref 66.5–200)

## 2024-12-06 ENCOUNTER — RESULTS FOLLOW-UP (OUTPATIENT)
Dept: OBGYN CLINIC | Facility: CLINIC | Age: 29
End: 2024-12-06

## 2024-12-06 DIAGNOSIS — N76.0 BV (BACTERIAL VAGINOSIS): Primary | ICD-10-CM

## 2024-12-06 DIAGNOSIS — B96.89 BV (BACTERIAL VAGINOSIS): Primary | ICD-10-CM

## 2024-12-06 LAB — VIT A SERPL-MCNC: 19.7 UG/DL (ref 18.9–57.3)

## 2024-12-06 RX ORDER — METRONIDAZOLE 500 MG/1
500 TABLET ORAL EVERY 12 HOURS SCHEDULED
Qty: 14 TABLET | Refills: 0 | Status: SHIPPED | OUTPATIENT
Start: 2024-12-06 | End: 2024-12-13

## 2024-12-09 ENCOUNTER — ROUTINE PRENATAL (OUTPATIENT)
Dept: PERINATAL CARE | Facility: CLINIC | Age: 29
End: 2024-12-09
Payer: MEDICARE

## 2024-12-09 VITALS
BODY MASS INDEX: 33.24 KG/M2 | HEIGHT: 63 IN | HEART RATE: 88 BPM | DIASTOLIC BLOOD PRESSURE: 68 MMHG | WEIGHT: 187.6 LBS | SYSTOLIC BLOOD PRESSURE: 124 MMHG

## 2024-12-09 DIAGNOSIS — Z98.84 HISTORY OF BARIATRIC SURGERY: ICD-10-CM

## 2024-12-09 DIAGNOSIS — O36.80X1 ENCOUNTER TO DETERMINE FETAL VIABILITY OF PREGNANCY, FETUS 1: ICD-10-CM

## 2024-12-09 DIAGNOSIS — Z36.82 NUCHAL TRANSLUCENCY OF FETUS ON PRENATAL ULTRASOUND: ICD-10-CM

## 2024-12-09 DIAGNOSIS — E66.811 OBESITY, CLASS I, BMI 30-34.9: ICD-10-CM

## 2024-12-09 DIAGNOSIS — Z36.0 ENCOUNTER FOR ANTENATAL SCREENING FOR CHROMOSOMAL ANOMALIES: ICD-10-CM

## 2024-12-09 DIAGNOSIS — Z3A.12 12 WEEKS GESTATION OF PREGNANCY: Primary | ICD-10-CM

## 2024-12-09 DIAGNOSIS — Z87.59 HISTORY OF INTRAUTERINE FETAL DEATH IN PREVIOUS PREGNANCY: ICD-10-CM

## 2024-12-09 PROCEDURE — 76813 OB US NUCHAL MEAS 1 GEST: CPT | Performed by: OBSTETRICS & GYNECOLOGY

## 2024-12-09 PROCEDURE — 76801 OB US < 14 WKS SINGLE FETUS: CPT | Performed by: OBSTETRICS & GYNECOLOGY

## 2024-12-09 PROCEDURE — 99244 OFF/OP CNSLTJ NEW/EST MOD 40: CPT | Performed by: OBSTETRICS & GYNECOLOGY

## 2024-12-09 NOTE — LETTER
December 9, 2024     Dee Dey MD  [address] [address]    Patient: Merline Madden   YOB: 1995   Date of Visit: 12/9/2024       Dear Dr. Dey:    Thank you for referring Merline Madden to me for evaluation. Below are my notes for this consultation.    If you have questions, please do not hesitate to call me. I look forward to following your patient along with you.         Sincerely,        Rod Green MD        CC: ELIDA Bravo MD  12/9/2024  4:05 PM  Sign when Signing Visit  A fetal ultrasound was completed. See Ob procedures in Epic for an interpretation and recommendations. Do not hesitate to contact us in [facility] if you have questions.    Rod Green MD, MSCE  Maternal Fetal Medicine

## 2024-12-09 NOTE — PROGRESS NOTES
A fetal ultrasound was completed. See Ob procedures in Epic for an interpretation and recommendations. Do not hesitate to contact us in [facility] if you have questions.    Rod Green MD, MSCE  Maternal Fetal Medicine

## 2024-12-23 ENCOUNTER — HOSPITAL ENCOUNTER (EMERGENCY)
Facility: HOSPITAL | Age: 29
Discharge: HOME | End: 2024-12-23
Attending: EMERGENCY MEDICINE
Payer: MEDICARE

## 2024-12-23 VITALS
SYSTOLIC BLOOD PRESSURE: 103 MMHG | RESPIRATION RATE: 18 BRPM | TEMPERATURE: 97.6 F | DIASTOLIC BLOOD PRESSURE: 57 MMHG | OXYGEN SATURATION: 100 % | HEART RATE: 80 BPM

## 2024-12-23 DIAGNOSIS — O46.90 VAGINAL BLEEDING IN PREGNANCY: Primary | ICD-10-CM

## 2024-12-23 PROBLEM — Z3A.14 14 WEEKS GESTATION OF PREGNANCY: Status: ACTIVE | Noted: 2024-12-04

## 2024-12-23 PROBLEM — O20.0 THREATENED MISCARRIAGE: Status: ACTIVE | Noted: 2024-12-23

## 2024-12-23 LAB
ABO GROUP BLD: NORMAL
ANION GAP SERPL CALCULATED.3IONS-SCNC: 4 MMOL/L (ref 4–13)
B-HCG SERPL-ACNC: ABNORMAL MIU/ML (ref 0–5)
BACTERIA UR QL AUTO: ABNORMAL /HPF
BASOPHILS # BLD AUTO: 0.03 THOUSANDS/ÂΜL (ref 0–0.1)
BASOPHILS NFR BLD AUTO: 0 % (ref 0–1)
BILIRUB UR QL STRIP: NEGATIVE
BLD GP AB SCN SERPL QL: NEGATIVE
BUN SERPL-MCNC: 9 MG/DL (ref 5–25)
CALCIUM SERPL-MCNC: 8.4 MG/DL (ref 8.4–10.2)
CHLORIDE SERPL-SCNC: 107 MMOL/L (ref 96–108)
CLARITY UR: CLEAR
CO2 SERPL-SCNC: 24 MMOL/L (ref 21–32)
COLOR UR: COLORLESS
CREAT SERPL-MCNC: 0.56 MG/DL (ref 0.6–1.3)
EOSINOPHIL # BLD AUTO: 0.19 THOUSAND/ÂΜL (ref 0–0.61)
EOSINOPHIL NFR BLD AUTO: 2 % (ref 0–6)
ERYTHROCYTE [DISTWIDTH] IN BLOOD BY AUTOMATED COUNT: 13.5 % (ref 11.6–15.1)
GFR SERPL CREATININE-BSD FRML MDRD: 125 ML/MIN/1.73SQ M
GLUCOSE SERPL-MCNC: 78 MG/DL (ref 65–140)
GLUCOSE UR STRIP-MCNC: NEGATIVE MG/DL
HCT VFR BLD AUTO: 34.2 % (ref 34.8–46.1)
HGB BLD-MCNC: 11.2 G/DL (ref 11.5–15.4)
HGB UR QL STRIP.AUTO: ABNORMAL
IMM GRANULOCYTES # BLD AUTO: 0.03 THOUSAND/UL (ref 0–0.2)
IMM GRANULOCYTES NFR BLD AUTO: 0 % (ref 0–2)
KETONES UR STRIP-MCNC: NEGATIVE MG/DL
LEUKOCYTE ESTERASE UR QL STRIP: ABNORMAL
LYMPHOCYTES # BLD AUTO: 1.57 THOUSANDS/ÂΜL (ref 0.6–4.47)
LYMPHOCYTES NFR BLD AUTO: 17 % (ref 14–44)
MCH RBC QN AUTO: 27.5 PG (ref 26.8–34.3)
MCHC RBC AUTO-ENTMCNC: 32.7 G/DL (ref 31.4–37.4)
MCV RBC AUTO: 84 FL (ref 82–98)
MONOCYTES # BLD AUTO: 0.6 THOUSAND/ÂΜL (ref 0.17–1.22)
MONOCYTES NFR BLD AUTO: 7 % (ref 4–12)
MUCOUS THREADS UR QL AUTO: ABNORMAL
NEUTROPHILS # BLD AUTO: 6.66 THOUSANDS/ÂΜL (ref 1.85–7.62)
NEUTS SEG NFR BLD AUTO: 74 % (ref 43–75)
NITRITE UR QL STRIP: NEGATIVE
NON-SQ EPI CELLS URNS QL MICRO: ABNORMAL /HPF
NRBC BLD AUTO-RTO: 0 /100 WBCS
PH UR STRIP.AUTO: 6 [PH]
PLATELET # BLD AUTO: 261 THOUSANDS/UL (ref 149–390)
PMV BLD AUTO: 10.1 FL (ref 8.9–12.7)
POTASSIUM SERPL-SCNC: 4 MMOL/L (ref 3.5–5.3)
PROT UR STRIP-MCNC: NEGATIVE MG/DL
RBC # BLD AUTO: 4.08 MILLION/UL (ref 3.81–5.12)
RBC #/AREA URNS AUTO: ABNORMAL /HPF
RH BLD: POSITIVE
SODIUM SERPL-SCNC: 135 MMOL/L (ref 135–147)
SP GR UR STRIP.AUTO: 1.01 (ref 1–1.03)
SPECIMEN EXPIRATION DATE: NORMAL
UROBILINOGEN UR STRIP-ACNC: <2 MG/DL
WBC # BLD AUTO: 9.08 THOUSAND/UL (ref 4.31–10.16)
WBC #/AREA URNS AUTO: ABNORMAL /HPF

## 2024-12-23 PROCEDURE — 81001 URINALYSIS AUTO W/SCOPE: CPT

## 2024-12-23 PROCEDURE — 80048 BASIC METABOLIC PNL TOTAL CA: CPT

## 2024-12-23 PROCEDURE — 84702 CHORIONIC GONADOTROPIN TEST: CPT

## 2024-12-23 PROCEDURE — 87186 SC STD MICRODIL/AGAR DIL: CPT

## 2024-12-23 PROCEDURE — 87086 URINE CULTURE/COLONY COUNT: CPT

## 2024-12-23 PROCEDURE — 86850 RBC ANTIBODY SCREEN: CPT

## 2024-12-23 PROCEDURE — 87077 CULTURE AEROBIC IDENTIFY: CPT

## 2024-12-23 PROCEDURE — NC001 PR NO CHARGE: Performed by: OBSTETRICS & GYNECOLOGY

## 2024-12-23 PROCEDURE — 36415 COLL VENOUS BLD VENIPUNCTURE: CPT

## 2024-12-23 PROCEDURE — 86901 BLOOD TYPING SEROLOGIC RH(D): CPT

## 2024-12-23 PROCEDURE — 86900 BLOOD TYPING SEROLOGIC ABO: CPT

## 2024-12-23 PROCEDURE — 85025 COMPLETE CBC W/AUTO DIFF WBC: CPT

## 2024-12-23 PROCEDURE — 99285 EMERGENCY DEPT VISIT HI MDM: CPT

## 2024-12-23 PROCEDURE — 99284 EMERGENCY DEPT VISIT MOD MDM: CPT

## 2024-12-23 NOTE — CONSULTS
Consultation - Gynecology  Merline Madden 29 y.o. female MRN: [id number]  Unit/Bed#: ED-27 Encounter: [id number]      Inpatient consult to Obstetrics / Gynecology  Consult performed by: Julia Vizcaino MD  Consult ordered by: Radha Uribe PA-C        Chief Complaint   Patient presents with    Vaginal Bleeding - Pregnant     Pt is 14 weeks pregnant and began bleeding 20 minutes ago- ob states for pt to be seen in ER. Denies cramping.        Assessment & Plan     Threatened miscarriage:  Patient presented with vaginal bleeding (soaked underwear prior to arrival then saturated 2 pads in the ED)  Started shortly after having intercourse this morning  Hgb 12.2 (3 weeks ago) -> 11.2 today  Asymptomatic other than vaginal bleeding  Speculum exam with small amount of blood in the vagina & no active bleeding or lacerations  Rhogam not indicated (Rh positive)  TAUS confirms normal-appearing IUP w/ active fetal movement &  bpm     Plan to follow up bleeding after 1 hr (new pad provided at 6 pm, ED providers to assess pad at 7 pm) - if not saturated, can follow up outpatient in 1 week  Strict return precautions reviewed    Physician Requesting Consult: Shira Nesbitt*    Reason for Consult / Principal Problem: vaginal bleeding in pregnancy    Subspeciality: OBGYN    HPI:  Merline Madden is a 29 y.o.  at 14w0d who presents with vaginal bleeding.  She had intercourse this morning and subsequently developed vaginal bleeding that soaked her underwear.  She subsequently presented to the ED where she saturated 2 pads.  She denies lightheadedness, dizziness, chest pain, SOB, palpitations, or syncope.  This is a desired pregnancy.  We reviewed that TAUS shows normal-appearing IUP with active fetal movement and normal FHR of 120 bpm.  We reviewed that her presentation is consistent with a threatened miscarriage and that since her bleeding is scant on speculum exam, we can monitor.  If  her bleeding remains controlled (<1 pad per hr) in the ED over the next hour, we reviewed recommendation for discharge home and outpatient follow up in 1 week.  Strict return precautions were reviewed including pain not controlled with Tylenol, bleeding exceeding 1 pad per hr, lightheadedness, dizziness, syncope, chest pain, SOB, or papillations.  She expressed understanding and was agreeable with the plan as stated.      Review of Systems   Constitutional:  Negative for chills and fever.   Eyes:  Negative for visual disturbance.   Respiratory:  Negative for cough and shortness of breath.    Cardiovascular:  Negative for chest pain and leg swelling.   Gastrointestinal:  Negative for abdominal pain, diarrhea, nausea and vomiting.   Genitourinary:  Positive for vaginal bleeding. Negative for dysuria, frequency, hematuria, pelvic pain, urgency and vaginal discharge.   Neurological:  Negative for dizziness, light-headedness and headaches.       Historical Information   Past Medical History:   Diagnosis Date    Herpes 2022    Varicella      Past Surgical History:   Procedure Laterality Date    BARIATRIC SURGERY  2023     OB History    Para Term  AB Living   2 1 1      SAB IAB Ectopic Multiple Live Births             # Outcome Date GA Lbr Guille/2nd Weight Sex Type Anes PTL Lv   2 Current            1 Term 24 40w1d  2551 g (5 lb 10 oz)  Vag-Spont EPI  FD      Complications: Shoulder Dystocia, Dead fetus in utero, Prolapse of umbilical cord     Family History   Problem Relation Age of Onset    Asthma Mother     Other Daughter         IUFD    Diabetes Paternal Grandmother      Social History   Social History     Substance and Sexual Activity   Alcohol Use Never     Social History     Substance and Sexual Activity   Drug Use Never     Social History     Tobacco Use   Smoking Status Never   Smokeless Tobacco Never       Meds/Allergies   No current facility-administered medications for this  encounter.      No Known Allergies    Objective     Vitals:  Blood pressure 103/57, pulse 79, temperature 97.6 °F (36.4 °C), temperature source Oral, resp. rate 18, last menstrual period 09/09/2024, SpO2 100%, not currently breastfeeding. There is no height or weight on file to calculate BMI.    No intake or output data in the 24 hours ending 12/23/24 1809    Invasive Devices       Peripheral Intravenous Line  Duration             Peripheral IV 12/23/24 Right Antecubital <1 day                    Physical Exam  Constitutional:       General: She is not in acute distress.     Appearance: Normal appearance. She is not ill-appearing.   HENT:      Mouth/Throat:      Mouth: Mucous membranes are moist.   Eyes:      Extraocular Movements: Extraocular movements intact.   Cardiovascular:      Rate and Rhythm: Normal rate.   Pulmonary:      Effort: Pulmonary effort is normal.   Abdominal:      General: There is no distension.      Palpations: Abdomen is soft.      Tenderness: There is no abdominal tenderness. There is no guarding.   Musculoskeletal:         General: No swelling or tenderness.      Right lower leg: No edema.      Left lower leg: No edema.   Skin:     General: Skin is warm and dry.      Coloration: Skin is not jaundiced or pale.   Neurological:      General: No focal deficit present.      Mental Status: She is alert.   Psychiatric:         Mood and Affect: Mood normal.         Behavior: Behavior normal.         Thought Content: Thought content normal.         Judgment: Judgment normal.     Speculum exam:  Normal external female genitalia  Cervix fully visualized and not visibly dilated  Small amount of bright red blood in the vaginal vault  No active bleeding or lesions/lacerations noted    TAUS:  Normally located IUP with active fetal movement &  bpm    Lab Results:   Recent Results (from the past 24 hours)   CBC and differential    Collection Time: 12/23/24  4:38 PM   Result Value Ref Range    WBC 9.08  4.31 - 10.16 Thousand/uL    RBC 4.08 3.81 - 5.12 Million/uL    Hemoglobin 11.2 (L) 11.5 - 15.4 g/dL    Hematocrit 34.2 (L) 34.8 - 46.1 %    MCV 84 82 - 98 fL    MCH 27.5 26.8 - 34.3 pg    MCHC 32.7 31.4 - 37.4 g/dL    RDW 13.5 11.6 - 15.1 %    MPV 10.1 8.9 - 12.7 fL    Platelets 261 149 - 390 Thousands/uL    nRBC 0 /100 WBCs    Segmented % 74 43 - 75 %    Immature Grans % 0 0 - 2 %    Lymphocytes % 17 14 - 44 %    Monocytes % 7 4 - 12 %    Eosinophils Relative 2 0 - 6 %    Basophils Relative 0 0 - 1 %    Absolute Neutrophils 6.66 1.85 - 7.62 Thousands/µL    Absolute Immature Grans 0.03 0.00 - 0.20 Thousand/uL    Absolute Lymphocytes 1.57 0.60 - 4.47 Thousands/µL    Absolute Monocytes 0.60 0.17 - 1.22 Thousand/µL    Eosinophils Absolute 0.19 0.00 - 0.61 Thousand/µL    Basophils Absolute 0.03 0.00 - 0.10 Thousands/µL   Basic metabolic panel    Collection Time: 12/23/24  4:38 PM   Result Value Ref Range    Sodium 135 135 - 147 mmol/L    Potassium 4.0 3.5 - 5.3 mmol/L    Chloride 107 96 - 108 mmol/L    CO2 24 21 - 32 mmol/L    ANION GAP 4 4 - 13 mmol/L    BUN 9 5 - 25 mg/dL    Creatinine 0.56 (L) 0.60 - 1.30 mg/dL    Glucose 78 65 - 140 mg/dL    Calcium 8.4 8.4 - 10.2 mg/dL    eGFR 125 ml/min/1.73sq m   Type and screen    Collection Time: 12/23/24  4:38 PM   Result Value Ref Range    ABO Grouping B     Rh Factor Positive     Antibody Screen Negative     Specimen Expiration Date 20241226    UA w Reflex to Microscopic w Reflex to Culture    Collection Time: 12/23/24  4:38 PM    Specimen: Urine, Clean Catch   Result Value Ref Range    Color, UA Colorless     Clarity, UA Clear     Specific Gravity, UA 1.015 1.003 - 1.030    pH, UA 6.0 4.5, 5.0, 5.5, 6.0, 6.5, 7.0, 7.5, 8.0    Leukocytes, UA Small (A) Negative    Nitrite, UA Negative Negative    Protein, UA Negative Negative mg/dl    Glucose, UA Negative Negative mg/dl    Ketones, UA Negative Negative mg/dl    Urobilinogen, UA <2.0 <2.0 mg/dl mg/dl    Bilirubin, UA Negative  Negative    Occult Blood, UA Large (A) Negative    URINE COMMENT     Urine Microscopic    Collection Time: 12/23/24  4:38 PM   Result Value Ref Range    RBC, UA Innumerable (A) None Seen, 1-2 /hpf    WBC, UA 10-20 (A) None Seen, 1-2 /hpf    Epithelial Cells Occasional None Seen, Occasional /hpf    Bacteria, UA Occasional None Seen, Occasional /hpf    MUCUS THREADS Occasional (A) None Seen    URINE COMMENT         Imaging:  TAUS with normal-appearing IUP with active fetal movement and  bpm    EKG, Pathology, and Other Studies:  N/A        Julia Vizcaino MD  OB-GYN Resident  12/23/2024  6:09 PM

## 2024-12-23 NOTE — ED PROVIDER NOTES
"Time reflects when diagnosis was documented in both MDM as applicable and the Disposition within this note       Time User Action Codes Description Comment    12/23/2024  6:06 PM Radha Uribe [O46.90] Vaginal bleeding in pregnancy           ED Disposition       ED Disposition   Discharge    Condition   Stable    Date/Time   Mon Dec 23, 2024  7:25 PM    Comment   Merline Madden discharge to home/self care.                   Assessment & Plan       Medical Decision Making  Patient presents with:  Vaginal Bleeding - Pregnant: Pt is 14 weeks pregnant and began bleeding 20 minutes ago- ob states for pt to be seen in ER. Denies cramping.     Patient presents with vaginal bleeding 14 weeks pregnant.  Reports heavy bleeding prior to arrival.  UA shows large blood in her urine. 1.0 drop in her hgb compared to most recent labs 3 weeks ago.  Bedside ultrasound shows IUP with cardiac activity.   OB/GYN evaluated the patient and performed a pelvic exam which did not show active bleeding. Patient monitored for an additional hour and had scant spotting.  Dr. Zimmerman, OB/GYN, recommended outpatient follow-up and 1 week with her OB/GYN.    Discussed findings from the visit with the patient.  We had a conversation regarding supportive care and indications for return.  Recommended appropriate follow-up.  Patient and/or family understand and agree with plan.    Portions of the record may have been created with voice recognition software. Occasional use of the incorrect word or \"sound a like\" substitutions may have occurred due to the inherent limitations of voice recognition software. Read the chart carefully and recognize, using context, where substitutions have occurred.         Amount and/or Complexity of Data Reviewed  Labs: ordered.  Radiology: ordered.        ED Course as of 12/24/24 0044   Mon Dec 23, 2024   1733 Ob will evaluate the patient   1808 Per Ob- no active bleeding on pelvic exam. Will reassess at 7 "       Medications - No data to display    ED Risk Strat Scores                          SBIRT 22yo+      Flowsheet Row Most Recent Value   Initial Alcohol Screen: US AUDIT-C     1. How often do you have a drink containing alcohol? 0 Filed at: 12/23/2024 1529   2. How many drinks containing alcohol do you have on a typical day you are drinking?  0 Filed at: 12/23/2024 1529   3b. FEMALE Any Age, or MALE 65+: How often do you have 4 or more drinks on one occassion? 0 Filed at: 12/23/2024 1529   Audit-C Score 0 Filed at: 12/23/2024 1529   [place]: How many times in the past year have you...    Used an illegal drug or used a prescription medication for non-medical reasons? Never Filed at: 12/23/2024 1529                            History of Present Illness       Chief Complaint   Patient presents with    Vaginal Bleeding - Pregnant     Pt is 14 weeks pregnant and began bleeding 20 minutes ago- ob states for pt to be seen in ER. Denies cramping.        Past Medical History:   Diagnosis Date    Herpes 2022    Varicella       Past Surgical History:   Procedure Laterality Date    BARIATRIC SURGERY  08/2023      Family History   Problem Relation Age of Onset    Asthma Mother     Other Daughter         IUFD    Diabetes Paternal Grandmother       Social History     Tobacco Use    Smoking status: Never    Smokeless tobacco: Never   Vaping Use    Vaping status: Former    Substances: Flavoring   Substance Use Topics    Alcohol use: Never    Drug use: Never      E-Cigarette/Vaping    E-Cigarette Use Former User       E-Cigarette/Vaping Substances    Nicotine No     THC No     CBD No     Flavoring Yes     Other No     Unknown No       I have reviewed and agree with the history as documented.     29-year-old G 2P1 female presenting to the emergency department 14 weeks pregnant with heavy vaginal bleeding.  She reports that 2 hours ago suddenly she had a large gush of blood that drenched the inside of her legs.  She states that since  then she has been soaking about 1 pad an hour.  States that she is currently being treated for a UTI.          Review of Systems   Constitutional:  Negative for chills, fatigue and fever.   HENT:  Negative for sore throat.    Respiratory:  Negative for cough and shortness of breath.    Cardiovascular:  Negative for chest pain.   Gastrointestinal:  Negative for abdominal pain, blood in stool, constipation, diarrhea, nausea and vomiting.   Genitourinary:  Positive for dysuria and vaginal bleeding. Negative for hematuria and vaginal discharge.   Neurological:  Negative for light-headedness.   All other systems reviewed and are negative.          Objective       ED Triage Vitals [12/23/24 1527]   Temperature Pulse Blood Pressure Respirations SpO2 Patient Position - Orthostatic VS   97.6 °F (36.4 °C) 79 103/57 18 100 % Sitting      Temp Source Heart Rate Source BP Location FiO2 (%) Pain Score    Oral Monitor Right arm -- No Pain      Vitals      Date and Time Temp Pulse SpO2 Resp BP Pain Score FACES Pain Rating User   12/23/24 1944 -- -- -- 18 -- -- -- [facility]   12/23/24 1745 -- 80 100 % -- -- -- -- [facility]   12/23/24 1527 97.6 °F (36.4 °C) 79 100 % 18 103/57 No Pain -- [facility]            Physical Exam  Vitals and nursing note reviewed.   Constitutional:       General: She is not in acute distress.     Appearance: She is well-developed. She is not ill-appearing.   HENT:      Head: Normocephalic and atraumatic.   Eyes:      Conjunctiva/sclera: Conjunctivae normal.   Cardiovascular:      Rate and Rhythm: Normal rate and regular rhythm.      Heart sounds: No murmur heard.  Pulmonary:      Effort: Pulmonary effort is normal. No respiratory distress.      Breath sounds: Normal breath sounds.   Abdominal:      General: There is no distension.      Palpations: Abdomen is soft.      Tenderness: There is no abdominal tenderness.   Musculoskeletal:         General: No swelling.      Cervical back: Neck supple.      Right lower leg: No edema.       Left lower leg: No edema.   Skin:     General: Skin is warm and dry.      Capillary Refill: Capillary refill takes less than 2 seconds.   Neurological:      Mental Status: She is alert.   Psychiatric:         Mood and Affect: Mood normal.         Results Reviewed       Procedure Component Value Units Date/Time    Quantitative hCG [[id number]]  (Abnormal) Collected: 12/23/24 1638    Lab Status: Final result Specimen: Blood from Arm, Right Updated: 12/23/24 2004     HCG, Quant 80,709.6 mIU/mL     Narrative:       Expected Ranges:    HCG results between 5.0 and 25.0 mIU/mL may be indicative of early pregnancy but should be interpreted in light of the total clinical presentation.    HCG can rise to detectable levels in jose c and post menopausal women (0-11.6 mIU/mL).     Approximate               Approximate HCG  Gestation age          Concentration ( mIU/mL)  _____________          ______________________   Weeks                      HCG values  0.2-1                       5-50  1-2                           2-3                         100-5000  3-4                         500-97081  4-5                         1000-62173  5-6                         [id number]  6-8                         02910-713423  8-12                        36012-981233      Urine Microscopic [[id number]]  (Abnormal) Collected: 12/23/24 1638    Lab Status: Final result Specimen: Urine, Clean Catch Updated: 12/23/24 1716     RBC, UA Innumerable /hpf      WBC, UA 10-20 /hpf      Epithelial Cells Occasional /hpf      Bacteria, UA Occasional /hpf      MUCUS THREADS Occasional     URINE COMMENT --    Basic metabolic panel [[id number]]  (Abnormal) Collected: 12/23/24 1638    Lab Status: Final result Specimen: Blood from Arm, Right Updated: 12/23/24 1701     Sodium 135 mmol/L      Potassium 4.0 mmol/L      Chloride 107 mmol/L      CO2 24 mmol/L      ANION GAP 4 mmol/L      BUN 9 mg/dL      Creatinine 0.56 mg/dL      Glucose 78 mg/dL       Calcium 8.4 mg/dL      eGFR 125 ml/min/1.73sq m     Narrative:      National Kid[employer] Foundation guidelines for Chronic Kidney Disease (CKD):     Stage 1 with normal or high GFR (GFR > 90 mL/min/1.73 square meters)    Stage 2 Mild CKD (GFR = 60-89 mL/min/1.73 square meters)    Stage 3A Moderate CKD (GFR = 45-59 mL/min/1.73 square meters)    Stage 3B Moderate CKD (GFR = 30-44 mL/min/1.73 square meters)    Stage 4 Severe CKD (GFR = 15-29 mL/min/1.73 square meters)    Stage 5 End Stage CKD (GFR <15 mL/min/1.73 square meters)  Note: GFR calculation is accurate only with a steady state creatinine    UA w Reflex to Microscopic w Reflex to Culture [[id number]]  (Abnormal) Collected: 12/23/24 1638    Lab Status: Final result Specimen: Urine, Clean Catch Updated: 12/23/24 1654     Color, UA Colorless     Clarity, UA Clear     Specific Gravity, UA 1.015     pH, UA 6.0     Leukocytes, UA Small     Nitrite, UA Negative     Protein, UA Negative mg/dl      Glucose, UA Negative mg/dl      Ketones, UA Negative mg/dl      Urobilinogen, UA <2.0 mg/dl      Bilirubin, UA Negative     Occult Blood, UA Large     URINE COMMENT --    Urine culture [[id number]] Collected: 12/23/24 1638    Lab Status: In process Specimen: Urine, Clean Catch Updated: 12/23/24 1654    CBC and differential [[id number]]  (Abnormal) Collected: 12/23/24 1638    Lab Status: Final result Specimen: Blood from Arm, Right Updated: 12/23/24 1645     WBC 9.08 Thousand/uL      RBC 4.08 Million/uL      Hemoglobin 11.2 g/dL      Hematocrit 34.2 %      MCV 84 fL      MCH 27.5 pg      MCHC 32.7 g/dL      RDW 13.5 %      MPV 10.1 fL      Platelets 261 Thousands/uL      nRBC 0 /100 WBCs      Segmented % 74 %      Immature Grans % 0 %      Lymphocytes % 17 %      Monocytes % 7 %      Eosinophils Relative 2 %      Basophils Relative 0 %      Absolute Neutrophils 6.66 Thousands/µL      Absolute Immature Grans 0.03 Thousand/uL      Absolute Lymphocytes 1.57 Thousands/µL       Absolute Monocytes 0.60 Thousand/µL      Eosinophils Absolute 0.19 Thousand/µL      Basophils Absolute 0.03 Thousands/µL             No orders to display       Procedures    ED Medication and Procedure Management   Prior to Admission Medications   Prescriptions Last Dose Informant Patient Reported? Taking?   Ferrous Sulfate (IRON PO)   Yes No   Sig: Take 1 tablet by mouth daily   Patient not taking: Reported on 12/9/2024   Prenatal Vit-Fe Fumarate-FA (PRENATAL VITAMIN PO)   Yes No   Sig: Take by mouth   acetaminophen (TYLENOL) 160 mg/5 mL liquid   Yes No   Sig: Take 15 mg/kg by mouth every 4 (four) hours as needed   Patient not taking: Reported on 12/9/2024   albuterol (PROVENTIL HFA,VENTOLIN HFA) 90 mcg/act inhaler   Yes No   Sig: Inhale 2 puffs every 4 (four) hours as needed   Patient not taking: Reported on 12/9/2024   aspirin 81 mg chewable tablet   No No   Sig: Chew 2 tablets (162 mg total) daily   diphenhydrAMINE HCl (BENADRYL ALLERGY PO)   Yes No   Sig: Take by mouth   Patient not taking: Reported on 12/9/2024   valACYclovir (VALTREX) 1,000 mg tablet   No No   Sig: Take 1 tablet (1,000 mg total) by mouth 2 (two) times a day for 7 days      Facility-Administered Medications: None     Discharge Medication List as of 12/23/2024  7:26 PM        CONTINUE these medications which have NOT CHANGED    Details   acetaminophen (TYLENOL) 160 mg/5 mL liquid Take 15 mg/kg by mouth every 4 (four) hours as needed, Historical Med      albuterol (PROVENTIL HFA,VENTOLIN HFA) 90 mcg/act inhaler Inhale 2 puffs every 4 (four) hours as needed, Starting Fri 5/31/2024, Historical Med      aspirin 81 mg chewable tablet Chew 2 tablets (162 mg total) daily, Starting Wed 12/4/2024, Until Mon 6/2/2025, Normal      diphenhydrAMINE HCl (BENADRYL ALLERGY PO) Take by mouth, Historical Med      Ferrous Sulfate (IRON PO) Take 1 tablet by mouth daily, Historical Med      Prenatal Vit-Fe Fumarate-FA (PRENATAL VITAMIN PO) Take by mouth, Historical  Med      valACYclovir (VALTREX) 1,000 mg tablet Take 1 tablet (1,000 mg total) by mouth 2 (two) times a day for 7 days, Starting Wed 12/4/2024, Until Wed 12/11/2024, Normal           No discharge procedures on file.  ED SEPSIS DOCUMENTATION   Time reflects when diagnosis was documented in both MDM as applicable and the Disposition within this note       Time User Action Codes Description Comment    12/23/2024  6:06 PM Radha Uribe Add [O46.90] Vaginal bleeding in pregnancy                  Radha Uribe PA-C  12/24/24 0044

## 2024-12-23 NOTE — ASSESSMENT & PLAN NOTE
Patient presented with vaginal bleeding (soaked underwear prior to arrival then saturated 2 pads in the ED)  Started shortly after having intercourse this morning  Hgb 12.2 (3 weeks ago) -> 11.2 today  Asymptomatic other than vaginal bleeding  Speculum exam with small amount of blood in the vagina & no active bleeding or lacerations  Rhogam not indicated (Rh positive)  TAUS confirms normal-appearing IUP w/ active fetal movement &  bpm    Plan to follow up bleeding after 1 hr (new pad provided at 6 pm, ED providers to assess pad at 7 pm) - if not saturated, can follow up outpatient in 1 week  Strict return precautions reviewed

## 2024-12-24 ENCOUNTER — RESULTS FOLLOW-UP (OUTPATIENT)
Dept: EMERGENCY DEPT | Facility: HOSPITAL | Age: 29
End: 2024-12-24

## 2024-12-24 ENCOUNTER — TELEPHONE (OUTPATIENT)
Dept: OBGYN CLINIC | Facility: CLINIC | Age: 29
End: 2024-12-24

## 2024-12-24 NOTE — TELEPHONE ENCOUNTER
"Per message below, I called patient using  services to schedule ED follow up. No answer, left vm to call back.      \"Julia Vizcaino MD   [facility] [place] [facility]  Hi all,    This patient presented to the ED with vaginal bleeding at 14w0d.  Can you call her to schedule a follow up visit in about 1 week?    Thanks!    Julia Vizcaino MD  OBGYN Resident  12/23/24  6:20 PM\"  "

## 2024-12-25 LAB
BACTERIA UR CULT: ABNORMAL
BACTERIA UR CULT: ABNORMAL

## 2024-12-31 ENCOUNTER — OFFICE VISIT (OUTPATIENT)
Dept: OBGYN CLINIC | Facility: CLINIC | Age: 29
End: 2024-12-31
Payer: MEDICARE

## 2024-12-31 VITALS
HEART RATE: 91 BPM | OXYGEN SATURATION: 100 % | DIASTOLIC BLOOD PRESSURE: 72 MMHG | BODY MASS INDEX: 33.84 KG/M2 | WEIGHT: 191 LBS | SYSTOLIC BLOOD PRESSURE: 110 MMHG | HEIGHT: 63 IN

## 2024-12-31 DIAGNOSIS — O23.42 URINARY TRACT INFECTION IN MOTHER DURING SECOND TRIMESTER OF PREGNANCY: ICD-10-CM

## 2024-12-31 DIAGNOSIS — O46.90 VAGINAL BLEEDING IN PREGNANCY: Primary | ICD-10-CM

## 2024-12-31 PROCEDURE — 99213 OFFICE O/P EST LOW 20 MIN: CPT | Performed by: OBSTETRICS & GYNECOLOGY

## 2024-12-31 RX ORDER — AMPICILLIN 500 MG/1
500 CAPSULE ORAL 4 TIMES DAILY
Qty: 28 CAPSULE | Refills: 0 | Status: SHIPPED | OUTPATIENT
Start: 2024-12-31 | End: 2025-01-07

## 2024-12-31 NOTE — PROGRESS NOTES
"Subjective:     Merline Madden is a 29 y.o.  female who presents for follow up from the ER on  with vaginal bleeding. The bleeding stopped on . She had a large gush of blood that brought her to the ER. In the ER speculum exam did not show any active bleeding and bedside ultrasound confirmed IUP with normal FHR. She does have a UTI that was diagnosed  and treated with keflex and culture on  shows persistent UTI.    Objective:    Vitals: Blood pressure 110/72, pulse 91, height 5' 3\" (1.6 m), weight 86.6 kg (191 lb), last menstrual period 2024, SpO2 100%, not currently breastfeeding.Body mass index is 33.83 kg/m².    Physical Exam  Constitutional:       Appearance: She is well-developed.   Cardiovascular:      Rate and Rhythm: Normal rate and regular rhythm.      Heart sounds: Normal heart sounds. No murmur heard.     No friction rub. No gallop.   Pulmonary:      Effort: Pulmonary effort is normal. No respiratory distress.      Breath sounds: No wheezing.   Abdominal:      Palpations: Abdomen is soft.      Tenderness: There is no abdominal tenderness.   Musculoskeletal:         General: No tenderness.   Neurological:      Mental Status: She is alert and oriented to person, place, and time.   Vitals reviewed.       Bedside U/S showed viable IUP. Not hematomas indentified. 's.    Assessment/Plan:    Problem List Items Addressed This Visit       Urinary tract infection in mother during first trimester of pregnancy    Relevant Medications    ampicillin (PRINCIPEN) 500 mg capsule     Other Visit Diagnoses         Vaginal bleeding in pregnancy    -  Primary              Terrance Stringer MD  2024  9:26 AM        "

## 2025-01-03 PROBLEM — O23.41 URINARY TRACT INFECTION IN MOTHER DURING FIRST TRIMESTER OF PREGNANCY: Status: RESOLVED | Noted: 2024-12-04 | Resolved: 2025-01-03

## 2025-01-06 ENCOUNTER — TELEPHONE (OUTPATIENT)
Dept: OBGYN CLINIC | Facility: CLINIC | Age: 30
End: 2025-01-06

## 2025-01-06 ENCOUNTER — ROUTINE PRENATAL (OUTPATIENT)
Dept: OBGYN CLINIC | Facility: CLINIC | Age: 30
End: 2025-01-06
Payer: MEDICARE

## 2025-01-06 VITALS
DIASTOLIC BLOOD PRESSURE: 58 MMHG | SYSTOLIC BLOOD PRESSURE: 100 MMHG | OXYGEN SATURATION: 98 % | HEIGHT: 63 IN | BODY MASS INDEX: 33.7 KG/M2 | HEART RATE: 86 BPM | WEIGHT: 190.2 LBS

## 2025-01-06 DIAGNOSIS — O36.4XX0 IUFD AT 20 WEEKS OR MORE OF GESTATION: ICD-10-CM

## 2025-01-06 DIAGNOSIS — R76.8 HEPATITIS C ANTIBODY POSITIVE IN BLOOD: ICD-10-CM

## 2025-01-06 DIAGNOSIS — O09.92 ENCOUNTER FOR SUPERVISION OF HIGH RISK PREGNANCY IN SECOND TRIMESTER, ANTEPARTUM: Primary | ICD-10-CM

## 2025-01-06 DIAGNOSIS — Z34.82 ENCOUNTER FOR SUPERVISION OF NORMAL PREGNANCY IN MULTIGRAVIDA IN SECOND TRIMESTER: Primary | ICD-10-CM

## 2025-01-06 DIAGNOSIS — Z3A.16 16 WEEKS GESTATION OF PREGNANCY: ICD-10-CM

## 2025-01-06 DIAGNOSIS — R76.8 HSV-2 SEROPOSITIVE: ICD-10-CM

## 2025-01-06 DIAGNOSIS — Z28.39 RUBELLA NON-IMMUNE STATUS, ANTEPARTUM: ICD-10-CM

## 2025-01-06 DIAGNOSIS — O09.899 RUBELLA NON-IMMUNE STATUS, ANTEPARTUM: ICD-10-CM

## 2025-01-06 PROCEDURE — 99213 OFFICE O/P EST LOW 20 MIN: CPT | Performed by: OBSTETRICS & GYNECOLOGY

## 2025-01-06 NOTE — TELEPHONE ENCOUNTER
Overall how are you doing? Patient states she is doing well.  Complaining of back pain.  Reports relief with Tylenol.  Reviewed stretches in Pregnancy Essentials Guidebook.     Compliant with routine OB care appointments? Yes    Have you completed your 1st trimester labs? Yes    If you had NIPS with MFM, do you have a order for MSAFP? Yes   Can be completed 15w-21w6d, ideally 16w-18w    Have you seen MFM and do you have your detailed US scheduled? Yes    Pregnancy Education-have you had a chance to review the classes offered and registered? No, reviewed prenatal classes and instructions for registering with patient.

## 2025-01-06 NOTE — PROGRESS NOTES
"Assessment & Plan  30 y.o.  at 16w0d presenting for routine prenatal visit.     Problem List       Herpes simplex vulvovaginitis    Overview   1st episode 2022 pos for HSV 2.         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  Delivery complicated by 10 minute shoulder dystocia  Patient desires elective primary  delivery         Previous bariatric surgery complicating pregnancy    Overview   Surgery: Sleeve 8/15/23 (in active weight loss phase)/Fetal growth monthly         HSV-2 seropositive    Overview   Valtrex at 36 weeks [ ]  Reports new HSV lesions, will send Valtrex         16 weeks gestation of pregnancy    Overview   Hepatitis B equivocal, rubella non immune  LDASA  Patient desires elective primary  section, schedule [ ]  MSAFP ordered  Delivery consent [ ]  Tdap [ ]  GBS [ ]  Contraception [ ]         Hepatitis C antibody positive in blood    Overview   + antibody on prenatal labs  F/u quant  Also had +antibody with negative quant in prior pregnancy         Rubella non-immune status, antepartum    Overview   MMR postpartum         Threatened miscarriage     Other Visit Diagnoses         Encounter for supervision of high risk pregnancy in second trimester, antepartum    -  Primary          ____________________________________________________________  Subjective  She is without complaint.   She denies contractions, loss of fluid, or vaginal bleeding.     Objective  /58 (BP Location: Left arm, Patient Position: Sitting, Cuff Size: Adult)   Pulse 86   Ht 5' 3\" (1.6 m)   Wt 86.3 kg (190 lb 3.2 oz)   LMP 2024   SpO2 98%   BMI 33.69 kg/m²   FHR: 140 bpm    Physical Exam  Constitutional:       Appearance: She is well-developed.   Cardiovascular:      Rate and Rhythm: Normal rate.   Pulmonary:      Effort: Pulmonary effort " is normal. No respiratory distress.   Abdominal:      Palpations: Abdomen is soft.      Tenderness: There is no abdominal tenderness.   Skin:     General: Skin is warm and dry.          Patient's Active Problem List  Patient Active Problem List   Diagnosis    Herpes simplex vulvovaginitis    ASCUS with positive high risk HPV cervical    IUFD at 20 weeks or more of gestation    Previous bariatric surgery complicating pregnancy    HSV-2 seropositive    16 weeks gestation of pregnancy    Hepatitis C antibody positive in blood    Rubella non-immune status, antepartum    Threatened miscarriage           Dee Dey MD  1/6/2025  11:59 AM

## 2025-01-28 ENCOUNTER — ROUTINE PRENATAL (OUTPATIENT)
Dept: PERINATAL CARE | Facility: CLINIC | Age: 30
End: 2025-01-28
Payer: MEDICARE

## 2025-01-28 VITALS
BODY MASS INDEX: 34.69 KG/M2 | HEART RATE: 94 BPM | WEIGHT: 195.8 LBS | DIASTOLIC BLOOD PRESSURE: 68 MMHG | HEIGHT: 63 IN | SYSTOLIC BLOOD PRESSURE: 106 MMHG

## 2025-01-28 DIAGNOSIS — O99.212 OBESITY AFFECTING PREGNANCY IN SECOND TRIMESTER, UNSPECIFIED OBESITY TYPE: ICD-10-CM

## 2025-01-28 DIAGNOSIS — Z3A.19 19 WEEKS GESTATION OF PREGNANCY: Primary | ICD-10-CM

## 2025-01-28 DIAGNOSIS — Z36.86 ENCOUNTER FOR ANTENATAL SCREENING FOR CERVICAL LENGTH: ICD-10-CM

## 2025-01-28 DIAGNOSIS — Z87.59 HISTORY OF IUFD: ICD-10-CM

## 2025-01-28 DIAGNOSIS — Z36.3 ENCOUNTER FOR ANTENATAL SCREENING FOR MALFORMATION: ICD-10-CM

## 2025-01-28 DIAGNOSIS — O99.842 PREGNANCY AFFECTED BY PREVIOUS BARIATRIC SURGERY, CURRENTLY IN SECOND TRIMESTER: ICD-10-CM

## 2025-01-28 PROCEDURE — 76811 OB US DETAILED SNGL FETUS: CPT | Performed by: STUDENT IN AN ORGANIZED HEALTH CARE EDUCATION/TRAINING PROGRAM

## 2025-01-28 PROCEDURE — 76817 TRANSVAGINAL US OBSTETRIC: CPT | Performed by: STUDENT IN AN ORGANIZED HEALTH CARE EDUCATION/TRAINING PROGRAM

## 2025-01-28 PROCEDURE — 99213 OFFICE O/P EST LOW 20 MIN: CPT | Performed by: STUDENT IN AN ORGANIZED HEALTH CARE EDUCATION/TRAINING PROGRAM

## 2025-01-28 NOTE — PROGRESS NOTES
Ultrasound Probe Disinfection    A transvaginal ultrasound was performed.   Prior to use, disinfection was performed with High Level Disinfection Process ([email or web address]).  Probe serial number [id number]: [id number] was used.    Nancy Mitchell  01/28/25  12:46 PM

## 2025-01-28 NOTE — PROGRESS NOTES
"[facility]: Ms. Madden was seen today for anatomic survey and cervical length screening ultrasound.  See ultrasound report under \"OB Procedures\" tab.      MDM:   I. Diagnoses/Problems addressed:  minimal  II.  Data:  moderate  III.  Risk of morbidity:  minimal    Please don't hesitate to contact our office with any concerns or questions.  -Anusha Sotelo MD      "

## 2025-02-03 ENCOUNTER — ROUTINE PRENATAL (OUTPATIENT)
Dept: OBGYN CLINIC | Facility: CLINIC | Age: 30
End: 2025-02-03
Payer: MEDICARE

## 2025-02-03 VITALS
DIASTOLIC BLOOD PRESSURE: 56 MMHG | BODY MASS INDEX: 35.08 KG/M2 | OXYGEN SATURATION: 99 % | SYSTOLIC BLOOD PRESSURE: 82 MMHG | HEART RATE: 94 BPM | HEIGHT: 63 IN | WEIGHT: 198 LBS

## 2025-02-03 DIAGNOSIS — N76.0 BV (BACTERIAL VAGINOSIS): ICD-10-CM

## 2025-02-03 DIAGNOSIS — Z3A.20 20 WEEKS GESTATION OF PREGNANCY: ICD-10-CM

## 2025-02-03 DIAGNOSIS — O99.842 PREGNANCY AFFECTED BY PREVIOUS BARIATRIC SURGERY, CURRENTLY IN SECOND TRIMESTER: Primary | ICD-10-CM

## 2025-02-03 DIAGNOSIS — B96.89 BV (BACTERIAL VAGINOSIS): ICD-10-CM

## 2025-02-03 DIAGNOSIS — O09.899 RUBELLA NON-IMMUNE STATUS, ANTEPARTUM: ICD-10-CM

## 2025-02-03 DIAGNOSIS — Z28.39 RUBELLA NON-IMMUNE STATUS, ANTEPARTUM: ICD-10-CM

## 2025-02-03 PROCEDURE — 99213 OFFICE O/P EST LOW 20 MIN: CPT | Performed by: OBSTETRICS & GYNECOLOGY

## 2025-02-03 RX ORDER — METRONIDAZOLE 7.5 MG/G
1 GEL VAGINAL 2 TIMES DAILY
Qty: 10 G | Refills: 0 | Status: SHIPPED | OUTPATIENT
Start: 2025-02-03 | End: 2025-02-04

## 2025-02-03 NOTE — PROGRESS NOTES
"Assessment & Plan  30 y.o.  at 20w0d presenting for routine prenatal visit.       Problem List       Herpes simplex vulvovaginitis    Overview   1st episode 2022 pos for HSV 2.         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  Delivery complicated by 10 minute shoulder dystocia  Patient desires elective primary  delivery         Previous bariatric surgery complicating pregnancy    Overview   Surgery: Sleeve 8/15/23 (in active weight loss phase)/Fetal growth monthly         HSV-2 seropositive    Overview   Valtrex at 36 weeks [ ]  Reports new HSV lesions, will send Valtrex         20 weeks gestation of pregnancy    Overview   Hepatitis B equivocal, rubella non immune  LDASA  Patient desires elective primary  section, schedule [ ]  MSAFP ordered  Delivery consent [ ]  Tdap [ ]  GBS [ ]  Contraception [ ]         Hepatitis C antibody positive in blood    Overview   + antibody on prenatal labs  F/u quant  Also had +antibody with negative quant in prior pregnancy         Rubella non-immune status, antepartum    Overview   MMR postpartum         Threatened miscarriage     Other Visit Diagnoses         BV (bacterial vaginosis)              ____________________________________________________________  Subjective  She is without complaint.   She denies contractions, loss of fluid, or vaginal bleeding.   She feels regular fetal movements.       Objective  BP (!) 82/56 (BP Location: Left arm, Patient Position: Sitting, Cuff Size: Standard)   Pulse 94   Ht 5' 3\" (1.6 m)   Wt 89.8 kg (198 lb)   LMP 2024   SpO2 99%   BMI 35.07 kg/m²   FHR: 140's via doppler    Physical Exam  Constitutional:       Appearance: She is well-developed.   Cardiovascular:      Rate and Rhythm: Normal rate and regular rhythm.      Heart sounds: Normal heart sounds. " No murmur heard.     No friction rub. No gallop.   Pulmonary:      Effort: Pulmonary effort is normal. No respiratory distress.      Breath sounds: No wheezing.   Abdominal:      Palpations: Abdomen is soft.      Tenderness: There is no abdominal tenderness.   Musculoskeletal:         General: No tenderness.   Neurological:      Mental Status: She is alert and oriented to person, place, and time.   Vitals reviewed.          Patient's Active Problem List  Patient Active Problem List   Diagnosis    Herpes simplex vulvovaginitis    ASCUS with positive high risk HPV cervical    IUFD at 20 weeks or more of gestation    Previous bariatric surgery complicating pregnancy    HSV-2 seropositive    20 weeks gestation of pregnancy    Hepatitis C antibody positive in blood    Rubella non-immune status, antepartum    Threatened miscarriage           Terrance Stringer MD  2/3/2025  2:38 PM

## 2025-02-04 ENCOUNTER — HOSPITAL ENCOUNTER (OUTPATIENT)
Facility: HOSPITAL | Age: 30
Discharge: HOME | End: 2025-02-04
Attending: OBSTETRICS & GYNECOLOGY | Admitting: OBSTETRICS & GYNECOLOGY
Payer: MEDICARE

## 2025-02-04 VITALS
HEART RATE: 80 BPM | OXYGEN SATURATION: 100 % | RESPIRATION RATE: 18 BRPM | TEMPERATURE: 97.4 F | DIASTOLIC BLOOD PRESSURE: 56 MMHG | SYSTOLIC BLOOD PRESSURE: 101 MMHG

## 2025-02-04 DIAGNOSIS — B96.89 BACTERIAL VAGINOSIS IN PREGNANCY: Primary | ICD-10-CM

## 2025-02-04 DIAGNOSIS — O23.599 BACTERIAL VAGINOSIS IN PREGNANCY: Primary | ICD-10-CM

## 2025-02-04 PROBLEM — R10.9 ABDOMINAL PAIN AFFECTING PREGNANCY: Status: ACTIVE | Noted: 2025-02-04

## 2025-02-04 PROBLEM — O26.899 ABDOMINAL PAIN AFFECTING PREGNANCY: Status: ACTIVE | Noted: 2025-02-04

## 2025-02-04 PROCEDURE — 76817 TRANSVAGINAL US OBSTETRIC: CPT

## 2025-02-04 PROCEDURE — 99202 OFFICE O/P NEW SF 15 MIN: CPT

## 2025-02-04 PROCEDURE — 76815 OB US LIMITED FETUS(S): CPT

## 2025-02-04 RX ORDER — METRONIDAZOLE 500 MG/1
500 TABLET ORAL EVERY 12 HOURS SCHEDULED
Qty: 14 TABLET | Refills: 0 | Status: SHIPPED | OUTPATIENT
Start: 2025-02-04 | End: 2025-02-04

## 2025-02-05 DIAGNOSIS — B96.89 BV (BACTERIAL VAGINOSIS): Primary | ICD-10-CM

## 2025-02-05 DIAGNOSIS — N76.0 BV (BACTERIAL VAGINOSIS): Primary | ICD-10-CM

## 2025-02-05 RX ORDER — METRONIDAZOLE 500 MG/1
500 TABLET ORAL EVERY 12 HOURS SCHEDULED
Qty: 14 TABLET | Refills: 0 | Status: SHIPPED | OUTPATIENT
Start: 2025-02-05 | End: 2025-02-12

## 2025-02-05 NOTE — PROGRESS NOTES
L&D Triage Note - OB/GYN  Merline Madden 30 y.o. female MRN: [id number]  Unit/Bed#:  TRIAGE  Encounter: [id number]    ASSESSMENT and PLAN:    Merline Madden is a 30 y.o.  at 20w1d who was evaluated today in triage due to RLQ pain consistent with round ligament pain. FHT 140s bpm. No contractions on toco. Microscopy notable for BV, cervical length wnl, SVE closed on speculum exam. Reassuring work up, the patient does not appear to be in  labor and it is safe to discharge home. Patient has metronidazole gel for BV already, so no new prescription sent. Encouraged belly band support.     - Continue routine prenatal care  - Discharge from OB triage with  labor precautions    - Reviewed rupture of membranes, false vs true labor, decreased fetal movement, and vaginal bleeding   - Pt to call provider with any concerns and follow up at her next scheduled prenatal appointment    - Case discussed with Dr. Paz     SUBJECTIVE:    Merline Madden 30 y.o.  at 20w1d with an Estimated Date of Delivery: 25 presenting with dull right lower quadrant abdominal pain.  She states it is worse when she is moving around.  She recently got a new job and is on her feet more often.  She denies any recent falls or trauma.  She denies fevers, chills, nausea, vomiting, chest pain, shortness of breath.  She was recently diagnosed with BV on 2/3/2025 at her prenatal appointment and was prescribed metronidazole gel which she has not picked up or taken this medication yet.    Her past obstetrical history is significant for a term fetal demise and vaginal delivery complicated by SD.  She had presented to the OB/GYN with contractions at 40 weeks and fetyus was found to have no heartbeat.    This pregnancy has been notable for history of HSV, ASCUS with positive HPV in 2019, previous bariatric surgery, hep C antibody positive, rubella nonimmune, early first trimester  bleeding.    Contractions: denies  Leakage of fluid: Denies  Vaginal Bleeding: Denies  Fetal movement: present    OBJECTIVE:    Vitals:    02/04/25 2120   BP: 101/56   Pulse: 80   Resp: 18   Temp: (!) 97.4 °F (36.3 °C)   SpO2: 100%       ROS:  Constitutional: Negative  Respiratory: Negative  Cardiovascular: Negative    Gastrointestinal: Negative    General Physical Exam:  General: Well appearing, no distress  Respiratory: Unlabored breathing  Cardiovascular: Regular rate.  Abdomen: Soft, gravid, nontender. No suprapubic tenderness. No CVA tenderness bilaterally   Fundal Height: Appropriate for gestational age.  Extremities: Warm and well perfused.  Non tender.      Fetal monitoring:  Fetal heart rate: 140s   [place]: No contractions      Cervical Exam  Speculum: Cervical os is closed. No abnormal discharge, no bleeding, no lesions, no pooling    KOH/WTMT:     Infection:   - positive clue cells    - no hyphae   - no trichomonads present    Membrane status   - no ferning   - negative nitrazene   - no pooling    Imaging:       TVUS   - Cervical length    - 3.4 cm    - 3.3 cm   - Presentation:  breech     TAUS: LVP 4.41 cm        Zoila Abraham MD  2/4/2025  10:26 PM

## 2025-02-05 NOTE — PROCEDURES
Merline Madden, a  at 20w1d with an FAHEEM of 2025, by Ultrasound, was seen at [facility] for the following procedure(s): $Procedure Type: US - Transvaginal, US - abdominal]        Ultrasound Other  Fetal Presentation: Breech  Cervical Length: 3.3  Funnel: No  Debris: No  Placenta Location: Fundal  Placenta Previa: No  Vasa Previa: No    LVP: 4.41 cm               Ultrasound Probe Disinfection    A transvaginal ultrasound was performed.   Prior to use, disinfection was performed with High Level Disinfection Process (Trophon)  Probe serial number SLAC-[id number] :  [id number] was used    Zoila Abraham MD  25  10:33 PM

## 2025-02-05 NOTE — DISCHARGE INSTR - AVS FIRST PAGE
Vaginal spotting is normal after today's examination.  Please call or return if you experience a gush of watery fluid, heavy vaginal bleeding, abdominal pain or cramping, decreased fetal movement.  Please follow-up with your routinely scheduled prenatal appointments.  Use the vaginal cream twice a day for 5 days

## 2025-02-07 DIAGNOSIS — B96.89 BV (BACTERIAL VAGINOSIS): Primary | ICD-10-CM

## 2025-02-07 DIAGNOSIS — N76.0 BV (BACTERIAL VAGINOSIS): Primary | ICD-10-CM

## 2025-02-07 RX ORDER — METRONIDAZOLE 7.5 MG/G
1 GEL VAGINAL
Qty: 7 G | Refills: 0 | Status: SHIPPED | OUTPATIENT
Start: 2025-02-07 | End: 2025-02-14

## 2025-02-20 ENCOUNTER — OFFICE VISIT (OUTPATIENT)
Age: 30
End: 2025-02-20
Payer: MEDICARE

## 2025-02-20 VITALS
WEIGHT: 201.6 LBS | HEIGHT: 63 IN | BODY MASS INDEX: 35.72 KG/M2 | HEART RATE: 99 BPM | SYSTOLIC BLOOD PRESSURE: 92 MMHG | DIASTOLIC BLOOD PRESSURE: 56 MMHG

## 2025-02-20 DIAGNOSIS — O36.4XX0 IUFD AT 20 WEEKS OR MORE OF GESTATION: ICD-10-CM

## 2025-02-20 DIAGNOSIS — O99.842 PREGNANCY AFFECTED BY PREVIOUS BARIATRIC SURGERY, CURRENTLY IN SECOND TRIMESTER: ICD-10-CM

## 2025-02-20 DIAGNOSIS — E66.811 OBESITY, CLASS I, BMI 30-34.9: ICD-10-CM

## 2025-02-20 DIAGNOSIS — Z3A.22 22 WEEKS GESTATION OF PREGNANCY: Primary | ICD-10-CM

## 2025-02-20 PROCEDURE — 76816 OB US FOLLOW-UP PER FETUS: CPT | Performed by: OBSTETRICS & GYNECOLOGY

## 2025-02-20 PROCEDURE — 99213 OFFICE O/P EST LOW 20 MIN: CPT | Performed by: OBSTETRICS & GYNECOLOGY

## 2025-02-20 NOTE — LETTER
February 21, 2025     Vi Castellanos MD  [address]  [address]  [place] [address]    Patient: Merline Madden   YOB: 1995   Date of Visit: 2/20/2025       Dear Dr. Castellanos:    Thank you for referring Merline Madden to me for evaluation. Below are my notes for this consultation.    If you have questions, please do not hesitate to call me. I look forward to following your patient along with you.         Sincerely,        Rod Green MD        CC: No Recipients    Rod Green MD  2/21/2025  6:53 PM  Sign when Signing Visit  A fetal ultrasound was completed. See Ob procedures in Epic for an interpretation and recommendations. Do not hesitate to contact us in [facility] if you have questions.    Rod Green MD, MSCE  Maternal Fetal Medicine

## 2025-02-20 NOTE — PROGRESS NOTES
The patient was located in [place] at the time of the visit, a state in which Dr. Green holds an active license. Patient acknowledged consent and understanding of privacy and security of the video platform. The patient has agreed to participate and understands they can discontinue the visit at any time. The patient was counseled via synchronous telemedicine encounter using Teams Virtual Rounding.

## 2025-02-21 NOTE — PROGRESS NOTES
A fetal ultrasound was completed. See Ob procedures in Epic for an interpretation and recommendations. Do not hesitate to contact us in [facility] if you have questions.    Rod Green MD, MSCE  Maternal Fetal Medicine

## 2025-03-05 ENCOUNTER — ROUTINE PRENATAL (OUTPATIENT)
Dept: OBGYN CLINIC | Facility: CLINIC | Age: 30
End: 2025-03-05
Payer: MEDICARE

## 2025-03-05 VITALS
HEIGHT: 63 IN | HEART RATE: 93 BPM | SYSTOLIC BLOOD PRESSURE: 108 MMHG | DIASTOLIC BLOOD PRESSURE: 58 MMHG | BODY MASS INDEX: 35.79 KG/M2 | OXYGEN SATURATION: 98 % | WEIGHT: 202 LBS

## 2025-03-05 DIAGNOSIS — Z3A.24 24 WEEKS GESTATION OF PREGNANCY: ICD-10-CM

## 2025-03-05 DIAGNOSIS — O09.92 ENCOUNTER FOR SUPERVISION OF HIGH RISK PREGNANCY IN SECOND TRIMESTER, ANTEPARTUM: Primary | ICD-10-CM

## 2025-03-05 DIAGNOSIS — Z28.39 RUBELLA NON-IMMUNE STATUS, ANTEPARTUM: ICD-10-CM

## 2025-03-05 DIAGNOSIS — O09.899 RUBELLA NON-IMMUNE STATUS, ANTEPARTUM: ICD-10-CM

## 2025-03-05 PROCEDURE — 99213 OFFICE O/P EST LOW 20 MIN: CPT | Performed by: OBSTETRICS & GYNECOLOGY

## 2025-03-05 RX ORDER — METRONIDAZOLE 7.5 MG/G
1 GEL VAGINAL DAILY
Qty: 70 G | Refills: 1 | Status: SHIPPED | OUTPATIENT
Start: 2025-03-05 | End: 2025-03-07

## 2025-03-05 RX ORDER — POLYETHYLENE GLYCOL 3350 17 G/17G
17 POWDER, FOR SOLUTION ORAL DAILY
Qty: 30 EACH | Refills: 1 | Status: SHIPPED | OUTPATIENT
Start: 2025-03-05

## 2025-03-05 NOTE — PROGRESS NOTES
"Assessment & Plan  30 y.o.  at 24w2d presenting for routine prenatal visit.     Problem List       Herpes simplex vulvovaginitis    Overview   1st episode 2022 pos for HSV 2.         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  Delivery complicated by 10 minute shoulder dystocia  Patient desires elective primary  delivery         Previous bariatric surgery complicating pregnancy    Overview   Surgery: Sleeve 8/15/23 (in active weight loss phase)/Fetal growth monthly         HSV-2 seropositive    Overview   Valtrex at 36 weeks [ ]  Reports new HSV lesions, will send Valtrex         24 weeks gestation of pregnancy    Overview   Hepatitis B equivocal, rubella non immune  LDASA  Patient desires elective primary  section, schedule [ ]  MSAFP ordered  Delivery consent [ ]  Tdap [ ]  GBS [ ]  Contraception [ ]         Hepatitis C antibody positive in blood    Overview   + antibody on prenatal labs  F/u quant  Also had +antibody with negative quant in prior pregnancy         Rubella non-immune status, antepartum    Overview   MMR postpartum         Threatened miscarriage    Bacterial vaginosis in pregnancy    Abdominal pain affecting pregnancy     Other Visit Diagnoses         Encounter for supervision of high risk pregnancy in second trimester, antepartum    -  Primary          ____________________________________________________________  Subjective  She reports constipation and BV symptoms; metrogel and miralax prescribed.  She denies contractions, loss of fluid, or vaginal bleeding.   She feels regular fetal movements.     Objective  /58 (BP Location: Left arm, Patient Position: Sitting, Cuff Size: Standard)   Pulse 93   Ht 5' 3\" (1.6 m)   Wt 91.6 kg (202 lb)   LMP 2024   SpO2 98%   BMI 35.78 kg/m²   FHR: 150 bpm  Fundal height " 25 cm    Physical Exam  Constitutional:       Appearance: She is well-developed.   Cardiovascular:      Rate and Rhythm: Normal rate.   Pulmonary:      Effort: Pulmonary effort is normal. No respiratory distress.   Abdominal:      Palpations: Abdomen is soft.      Tenderness: There is no abdominal tenderness.   Skin:     General: Skin is warm and dry.          Patient's Active Problem List  Patient Active Problem List   Diagnosis    Herpes simplex vulvovaginitis    ASCUS with positive high risk HPV cervical    IUFD at 20 weeks or more of gestation    Previous bariatric surgery complicating pregnancy    HSV-2 seropositive    24 weeks gestation of pregnancy    Hepatitis C antibody positive in blood    Rubella non-immune status, antepartum    Threatened miscarriage    Bacterial vaginosis in pregnancy    Abdominal pain affecting pregnancy           Dee Dey MD  3/5/2025  2:03 PM

## 2025-03-07 DIAGNOSIS — O09.92 ENCOUNTER FOR SUPERVISION OF HIGH RISK PREGNANCY IN SECOND TRIMESTER, ANTEPARTUM: ICD-10-CM

## 2025-03-07 RX ORDER — METRONIDAZOLE 7.5 MG/G
GEL VAGINAL
Qty: 70 G | Refills: 0 | Status: SHIPPED | OUTPATIENT
Start: 2025-03-07

## 2025-03-20 ENCOUNTER — ULTRASOUND (OUTPATIENT)
Age: 30
End: 2025-03-20
Payer: MEDICARE

## 2025-03-20 VITALS
DIASTOLIC BLOOD PRESSURE: 64 MMHG | HEART RATE: 100 BPM | HEIGHT: 63 IN | SYSTOLIC BLOOD PRESSURE: 102 MMHG | BODY MASS INDEX: 35.97 KG/M2 | WEIGHT: 203 LBS

## 2025-03-20 DIAGNOSIS — O99.842 PREGNANCY AFFECTED BY PREVIOUS BARIATRIC SURGERY, CURRENTLY IN SECOND TRIMESTER: ICD-10-CM

## 2025-03-20 DIAGNOSIS — O35.EXX0 PYELECTASIS OF FETUS ON PRENATAL ULTRASOUND: ICD-10-CM

## 2025-03-20 DIAGNOSIS — O09.292 IUGR (INTRAUTERINE GROWTH RETARDATION) & STILLBIRTH HISTORY, PREGNANT, SECOND TRIMESTER: Primary | ICD-10-CM

## 2025-03-20 DIAGNOSIS — Z3A.26 26 WEEKS GESTATION OF PREGNANCY: ICD-10-CM

## 2025-03-20 DIAGNOSIS — Z36.89 ENCOUNTER FOR ULTRASOUND TO ASSESS FETAL GROWTH: ICD-10-CM

## 2025-03-20 DIAGNOSIS — O99.213 OBESITY AFFECTING PREGNANCY IN THIRD TRIMESTER, UNSPECIFIED OBESITY TYPE: ICD-10-CM

## 2025-03-20 PROBLEM — F41.9 ANXIETY: Status: ACTIVE | Noted: 2017-02-15

## 2025-03-20 PROCEDURE — 76816 OB US FOLLOW-UP PER FETUS: CPT | Performed by: OBSTETRICS & GYNECOLOGY

## 2025-03-20 PROCEDURE — 99214 OFFICE O/P EST MOD 30 MIN: CPT | Performed by: OBSTETRICS & GYNECOLOGY

## 2025-03-20 NOTE — LETTER
March 20, 2025     Dee Dey MD  [address] [address]    Patient: Merline Madden   YOB: 1995   Date of Visit: 3/20/2025       Dear Dr. Dey:    Thank you for referring Merline Madden to me for evaluation. Below are my notes for this consultation.    If you have questions, please do not hesitate to call me. I look forward to following your patient along with you.         Sincerely,        Katelin Le MD        CC: No Recipients    Katelin Le MD  3/20/2025 12:51 PM  Sign when Signing Visit  Merline Madden  has no complaints today at 26w3d. She reports fetal movements and does not report any vaginal bleeding or signs of labor.  She declined NIPT she is here today for an ultrasound for fetal growth.  I used the language line to speak with Merline and her partner until the end when the  line disconnected before we were done.  At that time Merline was able to understand the rest of the appointment in English.     Problem list:  Prior term fetal demise that weighed 5 pounds 10 ounces at birth.  During labor the umbilical cord was felt at the internal os suggesting demise may have been caused from compression of the cord during early labor at home. At birth there was a 10-minute shoulder dystocia requiring Jordon, suprapubic, delivery of posterior arm and rotational maneuvers were performed. Baby was then delivered with Josefina maneuver.     Ultrasound findings:  The ultrasound today shows normal interval fetal growth and fluid.  Right kidney has new onset of renal pyelectasis.  A sagittal view of the right kidney was difficult to see to evaluate for hydronephrosis due to fetal position and maternal skin thickness.    Pregnancy ultrasound has limitations and is unable to detect all forms of fetal congenital abnormalities.      Specific counseling was provided on the following problems:  Bilateral renal pelvis dilation  (at least one renal pelvis measuring 4mm or greater in the 2nd trimester) was noted on the study today. This is a relatively common finding, diagnosed in up to 3% of all pregnancies. Although renal pelvic dilatation is a transient, physiologic state in most cases, urinary tract obstruction and vesicoureteral reflux can occasionally be causal. It occurs approximately twice as often in males than in females, and is bilateral in 20-40% of cases.    Follow up recommended:   Recommend a follow-up ultrasound for growth and review of the right-sided pyelectasis in 4 weeks.  She reports she can drink soda as long as it is a fountain drink with lots of ice to decrease the bubbles and sweetness.  For this reason I do not think she is going to tolerate a Glucola screen so this was canceled.  She states she has a glucometer with enough test strips and lancets to check a weeks worth of blood sugars (28 or more).  Fasting blood sugars should be 8 to 10 hours after her last meal and 2-hour postprandial blood sugar should be 2 hours after the start of each meal.  Fasting should be 95 or less and 2-hour postprandial should be 120 or less.  If she has 30% of her blood sugars in 1 week at any given time.  That are elevated then she qualifies for gestational diabetes.  I sent her a [employer] message to remind her to do her blood sugars and she can send a the results either to me or review them at her OB office.   She is reminded that she needs to complete her viral load for her hepatitis C testing which can be done with the rest of her 28-week labs.  It also appears HIV testing has been ordered but not completed yet either.    Pre visit time reviewing her records   5 minutes  Face to face time 15 minutes  Post visit time on documentation of note, updating her problem list, adding orders and prescriptions 10 minutes.  Procedures that were completed today were charged separately.   The level of decision making was moderate  complexity.    Katelin Le MD

## 2025-03-20 NOTE — Clinical Note
Please call patient to schedule 4-week ultrasound for growth follow-up and start twice weekly nonstress test and fluid scans at 32 weeks.  Katelin

## 2025-03-20 NOTE — LETTER
March 20, 2025     Dee Dey MD  [address] [address]    Patient: Merline Madden   YOB: 1995   Date of Visit: 3/20/2025       Dear Dr. Dey:    Thank you for referring Merline Madden to me for evaluation. Below are my notes for this consultation.    If you have questions, please do not hesitate to call me. I look forward to following your patient along with you.         Sincerely,        Katelin Le MD        CC: No Recipients    Katelin Le MD  3/20/2025 12:51 PM  Sign when Signing Visit  Merline Madden  has no complaints today at 26w3d. She reports fetal movements and does not report any vaginal bleeding or signs of labor.  She declined NIPT she is here today for an ultrasound for fetal growth.  I used the language line to speak with Merline and her partner until the end when the  line disconnected before we were done.  At that time Merline was able to understand the rest of the appointment in English.     Problem list:  Prior term fetal demise that weighed 5 pounds 10 ounces at birth.  During labor the umbilical cord was felt at the internal os suggesting demise may have been caused from compression of the cord during early labor at home. At birth there was a 10-minute shoulder dystocia requiring Jordon, suprapubic, delivery of posterior arm and rotational maneuvers were performed. Baby was then delivered with Josefina maneuver.     Ultrasound findings:  The ultrasound today shows normal interval fetal growth and fluid.  Right kidney has new onset of renal pyelectasis.  A sagittal view of the right kidney was difficult to see to evaluate for hydronephrosis due to fetal position and maternal skin thickness.    Pregnancy ultrasound has limitations and is unable to detect all forms of fetal congenital abnormalities.      Specific counseling was provided on the following problems:  Bilateral renal pelvis dilation  (at least one renal pelvis measuring 4mm or greater in the 2nd trimester) was noted on the study today. This is a relatively common finding, diagnosed in up to 3% of all pregnancies. Although renal pelvic dilatation is a transient, physiologic state in most cases, urinary tract obstruction and vesicoureteral reflux can occasionally be causal. It occurs approximately twice as often in males than in females, and is bilateral in 20-40% of cases.    Follow up recommended:   Recommend a follow-up ultrasound for growth and review of the right-sided pyelectasis in 4 weeks.  She reports she can drink soda as long as it is a fountain drink with lots of ice to decrease the bubbles and sweetness.  For this reason I do not think she is going to tolerate a Glucola screen so this was canceled.  She states she has a glucometer with enough test strips and lancets to check a weeks worth of blood sugars (28 or more).  Fasting blood sugars should be 8 to 10 hours after her last meal and 2-hour postprandial blood sugar should be 2 hours after the start of each meal.  Fasting should be 95 or less and 2-hour postprandial should be 120 or less.  If she has 30% of her blood sugars in 1 week at any given time.  That are elevated then she qualifies for gestational diabetes.  I sent her a [employer] message to remind her to do her blood sugars and she can send a the results either to me or review them at her OB office.   She is reminded that she needs to complete her viral load for her hepatitis C testing which can be done with the rest of her 28-week labs.  It also appears HIV testing has been ordered but not completed yet either.    Pre visit time reviewing her records   5 minutes  Face to face time 15 minutes  Post visit time on documentation of note, updating her problem list, adding orders and prescriptions 10 minutes.  Procedures that were completed today were charged separately.   The level of decision making was moderate  complexity.    Katelin Le MD

## 2025-03-20 NOTE — PROGRESS NOTES
Merline Madden  has no complaints today at 26w3d. She reports fetal movements and does not report any vaginal bleeding or signs of labor.  She declined NIPT she is here today for an ultrasound for fetal growth.  I used the language line to speak with Merline and her partner until the end when the  line disconnected before we were done.  At that time Merline was able to understand the rest of the appointment in English.     Problem list:  Prior term fetal demise that weighed 5 pounds 10 ounces at birth.  During labor the umbilical cord was felt at the internal os suggesting demise may have been caused from compression of the cord during early labor at home. At birth there was a 10-minute shoulder dystocia requiring Jordon, suprapubic, delivery of posterior arm and rotational maneuvers were performed. Baby was then delivered with Josefina maneuver.     Ultrasound findings:  The ultrasound today shows normal interval fetal growth and fluid.  Right kidney has new onset of renal pyelectasis.  A sagittal view of the right kidney was difficult to see to evaluate for hydronephrosis due to fetal position and maternal skin thickness.    Pregnancy ultrasound has limitations and is unable to detect all forms of fetal congenital abnormalities.      Specific counseling was provided on the following problems:  Bilateral renal pelvis dilation (at least one renal pelvis measuring 4mm or greater in the 2nd trimester) was noted on the study today. This is a relatively common finding, diagnosed in up to 3% of all pregnancies. Although renal pelvic dilatation is a transient, physiologic state in most cases, urinary tract obstruction and vesicoureteral reflux can occasionally be causal. It occurs approximately twice as often in males than in females, and is bilateral in 20-40% of cases.    Follow up recommended:   Recommend a follow-up ultrasound for growth and review of the right-sided pyelectasis in 4 weeks.  She  reports she can drink soda as long as it is a fountain drink with lots of ice to decrease the bubbles and sweetness.  For this reason I do not think she is going to tolerate a Glucola screen so this was canceled.  She states she has a glucometer with enough test strips and lancets to check a weeks worth of blood sugars (28 or more).  Fasting blood sugars should be 8 to 10 hours after her last meal and 2-hour postprandial blood sugar should be 2 hours after the start of each meal.  Fasting should be 95 or less and 2-hour postprandial should be 120 or less.  If she has 30% of her blood sugars in 1 week at any given time.  That are elevated then she qualifies for gestational diabetes.  I sent her a [employer] message to remind her to do her blood sugars and she can send a the results either to me or review them at her OB office.   She is reminded that she needs to complete her viral load for her hepatitis C testing which can be done with the rest of her 28-week labs.  It also appears HIV testing has been ordered but not completed yet either.    Pre visit time reviewing her records   5 minutes  Face to face time 15 minutes  Post visit time on documentation of note, updating her problem list, adding orders and prescriptions 10 minutes.  Procedures that were completed today were charged separately.   The level of decision making was moderate complexity.    Katelin Le MD

## 2025-03-20 NOTE — LETTER
March 20, 2025     Dee Dey MD  [address] [address]    Patient: Merline Madden   YOB: 1995   Date of Visit: 3/20/2025       Dear Dr. Dey:    Thank you for referring Merline Madden to me for evaluation. Below are my notes for this consultation.    If you have questions, please do not hesitate to call me. I look forward to following your patient along with you.         Sincerely,        Katelin Le MD        CC: No Recipients    Katelin Le MD  3/20/2025 12:51 PM  Sign when Signing Visit  Merline Madden  has no complaints today at 26w3d. She reports fetal movements and does not report any vaginal bleeding or signs of labor.  She declined NIPT she is here today for an ultrasound for fetal growth.  I used the language line to speak with Merline and her partner until the end when the  line disconnected before we were done.  At that time Merline was able to understand the rest of the appointment in English.     Problem list:  Prior term fetal demise that weighed 5 pounds 10 ounces at birth.  During labor the umbilical cord was felt at the internal os suggesting demise may have been caused from compression of the cord during early labor at home. At birth there was a 10-minute shoulder dystocia requiring Jordon, suprapubic, delivery of posterior arm and rotational maneuvers were performed. Baby was then delivered with Josefina maneuver.     Ultrasound findings:  The ultrasound today shows normal interval fetal growth and fluid.  Right kidney has new onset of renal pyelectasis.  A sagittal view of the right kidney was difficult to see to evaluate for hydronephrosis due to fetal position and maternal skin thickness.    Pregnancy ultrasound has limitations and is unable to detect all forms of fetal congenital abnormalities.      Specific counseling was provided on the following problems:  Bilateral renal pelvis dilation  (at least one renal pelvis measuring 4mm or greater in the 2nd trimester) was noted on the study today. This is a relatively common finding, diagnosed in up to 3% of all pregnancies. Although renal pelvic dilatation is a transient, physiologic state in most cases, urinary tract obstruction and vesicoureteral reflux can occasionally be causal. It occurs approximately twice as often in males than in females, and is bilateral in 20-40% of cases.    Follow up recommended:   Recommend a follow-up ultrasound for growth and review of the right-sided pyelectasis in 4 weeks.  She reports she can drink soda as long as it is a fountain drink with lots of ice to decrease the bubbles and sweetness.  For this reason I do not think she is going to tolerate a Glucola screen so this was canceled.  She states she has a glucometer with enough test strips and lancets to check a weeks worth of blood sugars (28 or more).  Fasting blood sugars should be 8 to 10 hours after her last meal and 2-hour postprandial blood sugar should be 2 hours after the start of each meal.  Fasting should be 95 or less and 2-hour postprandial should be 120 or less.  If she has 30% of her blood sugars in 1 week at any given time.  That are elevated then she qualifies for gestational diabetes.  I sent her a [employer] message to remind her to do her blood sugars and she can send a the results either to me or review them at her OB office.   She is reminded that she needs to complete her viral load for her hepatitis C testing which can be done with the rest of her 28-week labs.  It also appears HIV testing has been ordered but not completed yet either.    Pre visit time reviewing her records   5 minutes  Face to face time 15 minutes  Post visit time on documentation of note, updating her problem list, adding orders and prescriptions 10 minutes.  Procedures that were completed today were charged separately.   The level of decision making was moderate  complexity.    Katelin Le MD

## 2025-03-24 ENCOUNTER — HOSPITAL ENCOUNTER (OUTPATIENT)
Facility: HOSPITAL | Age: 30
Setting detail: OBSERVATION
Discharge: HOME | End: 2025-03-25
Attending: OBSTETRICS & GYNECOLOGY | Admitting: OBSTETRICS & GYNECOLOGY
Payer: MEDICARE

## 2025-03-24 ENCOUNTER — APPOINTMENT (OUTPATIENT)
Dept: ULTRASOUND IMAGING | Facility: HOSPITAL | Age: 30
End: 2025-03-24
Payer: MEDICARE

## 2025-03-24 PROBLEM — Z3A.27 27 WEEKS GESTATION OF PREGNANCY: Status: ACTIVE | Noted: 2024-12-04

## 2025-03-24 PROBLEM — R10.9 ACUTE LEFT FLANK PAIN: Status: ACTIVE | Noted: 2025-03-24

## 2025-03-24 LAB
ANION GAP SERPL CALCULATED.3IONS-SCNC: 5 MMOL/L (ref 4–13)
BACTERIA UR QL AUTO: ABNORMAL /HPF
BILIRUB UR QL STRIP: NEGATIVE
BUN SERPL-MCNC: 12 MG/DL (ref 5–25)
CALCIUM SERPL-MCNC: 8.5 MG/DL (ref 8.4–10.2)
CHLORIDE SERPL-SCNC: 106 MMOL/L (ref 96–108)
CLARITY UR: ABNORMAL
CO2 SERPL-SCNC: 26 MMOL/L (ref 21–32)
COLOR UR: YELLOW
CREAT SERPL-MCNC: 0.66 MG/DL (ref 0.6–1.3)
ERYTHROCYTE [DISTWIDTH] IN BLOOD BY AUTOMATED COUNT: 12.9 % (ref 11.6–15.1)
GFR SERPL CREATININE-BSD FRML MDRD: 118 ML/MIN/1.73SQ M
GLUCOSE SERPL-MCNC: 104 MG/DL (ref 65–140)
GLUCOSE UR STRIP-MCNC: NEGATIVE MG/DL
HCT VFR BLD AUTO: 33.9 % (ref 34.8–46.1)
HGB BLD-MCNC: 11.1 G/DL (ref 11.5–15.4)
HGB UR QL STRIP.AUTO: ABNORMAL
KETONES UR STRIP-MCNC: ABNORMAL MG/DL
LEUKOCYTE ESTERASE UR QL STRIP: ABNORMAL
MCH RBC QN AUTO: 26.6 PG (ref 26.8–34.3)
MCHC RBC AUTO-ENTMCNC: 32.7 G/DL (ref 31.4–37.4)
MCV RBC AUTO: 81 FL (ref 82–98)
MUCOUS THREADS UR QL AUTO: ABNORMAL
NITRITE UR QL STRIP: NEGATIVE
NON-SQ EPI CELLS URNS QL MICRO: ABNORMAL /HPF
PH UR STRIP.AUTO: 7.5 [PH]
PLATELET # BLD AUTO: 258 THOUSANDS/UL (ref 149–390)
PMV BLD AUTO: 10.6 FL (ref 8.9–12.7)
POTASSIUM SERPL-SCNC: 3.4 MMOL/L (ref 3.5–5.3)
PROT UR STRIP-MCNC: ABNORMAL MG/DL
RBC # BLD AUTO: 4.17 MILLION/UL (ref 3.81–5.12)
RBC #/AREA URNS AUTO: ABNORMAL /HPF
SODIUM SERPL-SCNC: 137 MMOL/L (ref 135–147)
SP GR UR STRIP.AUTO: 1.03 (ref 1–1.03)
TRI-PHOS CRY URNS QL MICRO: ABNORMAL /HPF
UROBILINOGEN UR STRIP-ACNC: <2 MG/DL
WBC # BLD AUTO: 10.05 THOUSAND/UL (ref 4.31–10.16)
WBC #/AREA URNS AUTO: ABNORMAL /HPF

## 2025-03-24 PROCEDURE — G0379 DIRECT REFER HOSPITAL OBSERV: HCPCS

## 2025-03-24 PROCEDURE — 76775 US EXAM ABDO BACK WALL LIM: CPT

## 2025-03-24 PROCEDURE — 87186 SC STD MICRODIL/AGAR DIL: CPT

## 2025-03-24 PROCEDURE — 76817 TRANSVAGINAL US OBSTETRIC: CPT | Performed by: OBSTETRICS & GYNECOLOGY

## 2025-03-24 PROCEDURE — 99222 1ST HOSP IP/OBS MODERATE 55: CPT | Performed by: OBSTETRICS & GYNECOLOGY

## 2025-03-24 PROCEDURE — 87077 CULTURE AEROBIC IDENTIFY: CPT

## 2025-03-24 PROCEDURE — 87086 URINE CULTURE/COLONY COUNT: CPT

## 2025-03-24 PROCEDURE — NC001 PR NO CHARGE: Performed by: OBSTETRICS & GYNECOLOGY

## 2025-03-24 PROCEDURE — 85027 COMPLETE CBC AUTOMATED: CPT

## 2025-03-24 PROCEDURE — 81001 URINALYSIS AUTO W/SCOPE: CPT

## 2025-03-24 PROCEDURE — 80048 BASIC METABOLIC PNL TOTAL CA: CPT

## 2025-03-24 RX ORDER — ALBUTEROL SULFATE 90 UG/1
2 INHALANT RESPIRATORY (INHALATION) EVERY 4 HOURS PRN
Status: DISCONTINUED | OUTPATIENT
Start: 2025-03-24 | End: 2025-03-25 | Stop reason: HOSPADM

## 2025-03-24 RX ORDER — POLYETHYLENE GLYCOL 3350 17 G/17G
17 POWDER, FOR SOLUTION ORAL DAILY
Status: DISCONTINUED | OUTPATIENT
Start: 2025-03-24 | End: 2025-03-24

## 2025-03-24 RX ORDER — ACETAMINOPHEN 10 MG/ML
1000 INJECTION, SOLUTION INTRAVENOUS EVERY 6 HOURS SCHEDULED
Status: DISCONTINUED | OUTPATIENT
Start: 2025-03-24 | End: 2025-03-25 | Stop reason: HOSPADM

## 2025-03-24 RX ORDER — CALCIUM CARBONATE 500 MG/1
1000 TABLET, CHEWABLE ORAL DAILY PRN
Status: DISCONTINUED | OUTPATIENT
Start: 2025-03-24 | End: 2025-03-25 | Stop reason: HOSPADM

## 2025-03-24 RX ORDER — POLYETHYLENE GLYCOL 3350 17 G/17G
17 POWDER, FOR SOLUTION ORAL 2 TIMES DAILY
Status: DISCONTINUED | OUTPATIENT
Start: 2025-03-24 | End: 2025-03-25 | Stop reason: HOSPADM

## 2025-03-24 RX ORDER — SODIUM CHLORIDE, SODIUM LACTATE, POTASSIUM CHLORIDE, CALCIUM CHLORIDE 600; 310; 30; 20 MG/100ML; MG/100ML; MG/100ML; MG/100ML
125 INJECTION, SOLUTION INTRAVENOUS CONTINUOUS
Status: DISCONTINUED | OUTPATIENT
Start: 2025-03-24 | End: 2025-03-25 | Stop reason: HOSPADM

## 2025-03-24 RX ORDER — ONDANSETRON 2 MG/ML
4 INJECTION INTRAMUSCULAR; INTRAVENOUS EVERY 8 HOURS PRN
Status: DISCONTINUED | OUTPATIENT
Start: 2025-03-24 | End: 2025-03-25 | Stop reason: HOSPADM

## 2025-03-24 RX ORDER — ASPIRIN 81 MG/1
162 TABLET, CHEWABLE ORAL DAILY
Status: DISCONTINUED | OUTPATIENT
Start: 2025-03-24 | End: 2025-03-25 | Stop reason: HOSPADM

## 2025-03-24 RX ORDER — SODIUM CHLORIDE, SODIUM LACTATE, POTASSIUM CHLORIDE, CALCIUM CHLORIDE 600; 310; 30; 20 MG/100ML; MG/100ML; MG/100ML; MG/100ML
125 INJECTION, SOLUTION INTRAVENOUS CONTINUOUS
Status: DISCONTINUED | OUTPATIENT
Start: 2025-03-24 | End: 2025-03-24

## 2025-03-24 RX ORDER — TAMSULOSIN HYDROCHLORIDE 0.4 MG/1
0.4 CAPSULE ORAL
Status: DISCONTINUED | OUTPATIENT
Start: 2025-03-24 | End: 2025-03-25 | Stop reason: HOSPADM

## 2025-03-24 RX ORDER — HYDROMORPHONE HCL/PF 1 MG/ML
0.5 SYRINGE (ML) INJECTION EVERY 4 HOURS PRN
Status: DISCONTINUED | OUTPATIENT
Start: 2025-03-24 | End: 2025-03-25 | Stop reason: HOSPADM

## 2025-03-24 RX ADMIN — ACETAMINOPHEN 1000 MG: 10 INJECTION INTRAVENOUS at 10:58

## 2025-03-24 RX ADMIN — SODIUM CHLORIDE, SODIUM LACTATE, POTASSIUM CHLORIDE, AND CALCIUM CHLORIDE 1000 ML: .6; .31; .03; .02 INJECTION, SOLUTION INTRAVENOUS at 10:00

## 2025-03-24 RX ADMIN — ACETAMINOPHEN 1000 MG: 10 INJECTION INTRAVENOUS at 18:29

## 2025-03-24 RX ADMIN — TAMSULOSIN HYDROCHLORIDE 0.4 MG: 0.4 CAPSULE ORAL at 17:11

## 2025-03-24 RX ADMIN — MAGNESIUM HYDROXIDE 30 ML: 2400 SUSPENSION ORAL at 13:52

## 2025-03-24 RX ADMIN — SODIUM CHLORIDE, SODIUM LACTATE, POTASSIUM CHLORIDE, AND CALCIUM CHLORIDE 125 ML/HR: .6; .31; .03; .02 INJECTION, SOLUTION INTRAVENOUS at 14:59

## 2025-03-24 RX ADMIN — ASPIRIN 162 MG: 81 TABLET, CHEWABLE ORAL at 14:59

## 2025-03-24 RX ADMIN — HYDROMORPHONE HYDROCHLORIDE 0.5 MG: 1 INJECTION, SOLUTION INTRAMUSCULAR; INTRAVENOUS; SUBCUTANEOUS at 14:57

## 2025-03-24 NOTE — PLAN OF CARE
Problem: ANTEPARTUM  Goal: Maintain pregnancy as long as maternal and/or fetal condition is stable  Description: INTERVENTIONS:- Maternal surveillance- Fetal surveillance- Monitor uterine activity- Medications as ordered- Bedrest  Outcome: Progressing     Problem: GASTROINTESTINAL - ADULT  Goal: Maintains or returns to baseline bowel function  Description: INTERVENTIONS:- Assess bowel function- Encourage oral fluids to ensure adequate hydration- Administer IV fluids if ordered to ensure adequate hydration- Administer ordered medications as needed- Encourage mobilization and activity- Consider nutritional services referral to assist patient with adequate nutrition and appropriate food choices  Outcome: Progressing     Problem: GENITOURINARY - ADULT  Goal: Maintains or returns to baseline urinary function  Description: INTERVENTIONS:- Assess urinary function- Encourage oral fluids to ensure adequate hydration if ordered- Administer IV fluids as ordered to ensure adequate hydration- Administer ordered medications as needed- Offer frequent toileting- Follow urinary retention protocol if ordered  Outcome: Progressing

## 2025-03-24 NOTE — PROGRESS NOTES
L&D Triage Note - OB/GYN  Merline Madden 30 y.o. female MRN: [id number]  Unit/Bed#:  TRIAGE 3-01 Encounter: [id number]      Assessment:  30 y.o.  at 27w0d present with pain that started about 6 am   She reports that she had some ab pain last night   She was only able to pass out a little stool this am and no BM x 3 days  The pain is coliclly in nature and on the pain the uterus remains soft and contractions are not able to be palpated.      Plan:  1. IV hydration   2. Cervical length completed average is 2.9              ______________________________________________________________________          Objective:  There were no vitals filed for this visit.    SVE: 0.5 / 0% / -5  SSE:    FHT:  150 / appropriate for 27 weeks  / no dec  [place]: none appreciated but short monitoring time pt to be back on the monitor now.      TVUS:    Cervical length: 2.90 cm    Vertex NO    No  funneling, none  dynamic changes      Edilson Paz MD 3/24/2025 9:13 AM

## 2025-03-24 NOTE — PROCEDURES
//Merline Madden, a  at 27w0d with [facility] at [facility] for the following procedure(s):  ]         Cervical length done     The cervical length x 3 was   2.90/2.98/3.1 cm   There is no funneling with valsalva   There is no membranes in the os                        Ultrasound Probe Disinfection    A transvaginal ultrasound was performed.   Prior to use, disinfection was performed with High Level Disinfection Process (Trophon)    Edilson Paz MD  25  9:08 AM

## 2025-03-24 NOTE — PROGRESS NOTES
HP with updates. - OB/GYN  Merilne Madden 30 y.o. female MRN: [id number]  Unit/Bed#:  TRIAGE 3-01 Encounter: [id number]      Assessment:  30 y.o.  at 27w0d present with pain that started about 6 am   She reports that she had some ab pain last night   She was only able to pass out a little stool this am and no BM x 3 days  The pain is coliclly in nature and on the pain the uterus remains soft and contractions are not able to be palpated.       Strong suspicion for Kidney stone -   Constipation - PTL ruled out       Plan:  1. IV hydration   2. Cervical length completed average is 2.9.  3.  Sonogram Kidney    Dilated left side no sign of stone     Flomax and IV hydration and Pain meds.   4.  UA- showed blood  5.  Observation                 ______________________________________________________________________          Objective:  There were no vitals filed for this visit.    SVE: 0.5 / 0% / -5  SSE:    FHT:  150 / appropriate for 27 weeks  / no dec  [place]: none appreciated but short monitoring time pt to be back on the monitor now.      Prenatal Labs:  Lab Results   Component Value Date/Time    ABO B 2024 04:38 PM    RH Positive 2024 04:38 PM    HGB 11.1 (L) 2025 09:59 AM    HGB 13.3 2018 03:49 PM    HCT 33.9 (L) 2025 09:59 AM    HCT 40.6 2018 03:49 PM     2025 09:59 AM     2018 03:49 PM    HEPBSAG Non-reactive 2024 11:24 AM    RUBELLAIGGQT <10.0 (L) 2024 11:24 AM    [id number] 95 2024 11:24 AM          TVUS:    Cervical length: 2.90 cm    Vertex NO    No  funneling, none  dynamic changes      Edilson Paz MD 3/24/2025 2:42 PM

## 2025-03-24 NOTE — H&P
HP with updates. - OB/GYN  Merline Madden 30 y.o. female MRN: [id number]  Unit/Bed#:  TRIAGE 3-01 Encounter: [id number]        Assessment:  30 y.o.  at 27w0d present with pain that started about 6 am   She reports that she had some ab pain last night   She was only able to pass out a little stool this am and no BM x 3 days  The pain is coliclly in nature and on the pain the uterus remains soft and contractions are not able to be palpated.        Strong suspicion for Kidney stone -   Constipation - PTL ruled out         Plan:  1. IV hydration   2. Cervical length completed average is 2.9.  3.  Sonogram Kidney               Dilated left side no sign of stone                Flomax and IV hydration and Pain meds.   4.  UA- showed blood  5.  Observation                        ______________________________________________________________________              Objective:  There were no vitals filed for this visit.     SVE: 0.5 / 0% / -5  SSE:    FHT:  150 / appropriate for 27 weeks  / no dec  [place]: none appreciated but short monitoring time pt to be back on the monitor now.       Prenatal Labs:        Lab Results   Component Value Date/Time     ABO B 2024 04:38 PM     RH Positive 2024 04:38 PM     HGB 11.1 (L) 2025 09:59 AM     HGB 13.3 2018 03:49 PM     HCT 33.9 (L) 2025 09:59 AM     HCT 40.6 2018 03:49 PM      2025 09:59 AM      2018 03:49 PM     HEPBSAG Non-reactive 2024 11:24 AM     RUBELLAIGGQT <10.0 (L) 2024 11:24 AM     [id number] 95 2024 11:24 AM            TVUS:               Cervical length: 2.90 cm               Vertex NO               No  funneling, none  dynamic changes        Edilson Paz MD 3/24/2025 2:42 PM

## 2025-03-25 VITALS
HEIGHT: 63 IN | TEMPERATURE: 98.2 F | OXYGEN SATURATION: 99 % | DIASTOLIC BLOOD PRESSURE: 54 MMHG | WEIGHT: 203 LBS | RESPIRATION RATE: 16 BRPM | BODY MASS INDEX: 35.97 KG/M2 | HEART RATE: 114 BPM | SYSTOLIC BLOOD PRESSURE: 100 MMHG

## 2025-03-25 PROBLEM — K59.00 CONSTIPATION: Status: ACTIVE | Noted: 2025-03-25

## 2025-03-25 PROBLEM — R10.9 ACUTE LEFT FLANK PAIN: Status: RESOLVED | Noted: 2025-03-24 | Resolved: 2025-03-25

## 2025-03-25 PROBLEM — K59.00 CONSTIPATION: Status: RESOLVED | Noted: 2025-03-25 | Resolved: 2025-03-25

## 2025-03-25 LAB
ERYTHROCYTE [DISTWIDTH] IN BLOOD BY AUTOMATED COUNT: 12.7 % (ref 11.6–15.1)
HCT VFR BLD AUTO: 30.6 % (ref 34.8–46.1)
HGB BLD-MCNC: 9.9 G/DL (ref 11.5–15.4)
MCH RBC QN AUTO: 26.5 PG (ref 26.8–34.3)
MCHC RBC AUTO-ENTMCNC: 32.4 G/DL (ref 31.4–37.4)
MCV RBC AUTO: 82 FL (ref 82–98)
PLATELET # BLD AUTO: 237 THOUSANDS/UL (ref 149–390)
PMV BLD AUTO: 10.2 FL (ref 8.9–12.7)
RBC # BLD AUTO: 3.74 MILLION/UL (ref 3.81–5.12)
WBC # BLD AUTO: 10.35 THOUSAND/UL (ref 4.31–10.16)

## 2025-03-25 PROCEDURE — 99214 OFFICE O/P EST MOD 30 MIN: CPT

## 2025-03-25 PROCEDURE — 85027 COMPLETE CBC AUTOMATED: CPT

## 2025-03-25 RX ORDER — METOCLOPRAMIDE 10 MG/1
10 TABLET ORAL ONCE
Status: COMPLETED | OUTPATIENT
Start: 2025-03-25 | End: 2025-03-25

## 2025-03-25 RX ORDER — DIPHENHYDRAMINE HCL 25 MG
25 TABLET ORAL ONCE
Status: COMPLETED | OUTPATIENT
Start: 2025-03-25 | End: 2025-03-25

## 2025-03-25 RX ADMIN — METOCLOPRAMIDE 10 MG: 10 TABLET ORAL at 08:46

## 2025-03-25 RX ADMIN — DIPHENHYDRAMINE HYDROCHLORIDE 25 MG: 25 TABLET ORAL at 08:46

## 2025-03-25 RX ADMIN — ASPIRIN 162 MG: 81 TABLET, CHEWABLE ORAL at 09:11

## 2025-03-25 RX ADMIN — ACETAMINOPHEN 1000 MG: 10 INJECTION INTRAVENOUS at 00:03

## 2025-03-25 RX ADMIN — PRENATAL VIT W/ FE FUMARATE-FA TAB 27-0.8 MG 1 TABLET: 27-0.8 TAB at 09:11

## 2025-03-25 RX ADMIN — POLYETHYLENE GLYCOL 3350 17 G: 17 POWDER, FOR SOLUTION ORAL at 09:11

## 2025-03-25 RX ADMIN — ACETAMINOPHEN 1000 MG: 10 INJECTION INTRAVENOUS at 05:14

## 2025-03-25 NOTE — DISCHARGE INSTR - AVS FIRST PAGE
You were admitted for constipation and possible kidney stone.  Your pain improved and you are being discharged home in good condition.  You should follow up at your next prenatal visit.

## 2025-03-25 NOTE — ASSESSMENT & PLAN NOTE
UA suspicious for nephrolithasis with RBC and WBC  Urine culture pending  No fevers or elevated WBC count, low suspicion for pyelonephritis    -Continue flomax while inpatient  -Continue IV fluids while inpatient  -Excellent hydration outpatient  -Follow up urine culture

## 2025-03-25 NOTE — PROGRESS NOTES
Progress Note - OB/GYN   Name: Merline Madden 30 y.o. female I MRN: [id number]  Unit/Bed#: -01 I Date of Admission: 3/24/2025   Date of Service: 3/25/2025 I Hospital Day: 0     Assessment & Plan  Acute left flank pain  UA suspicious for nephrolithasis with RBC and WBC  Urine culture pending  No fevers or elevated WBC count, low suspicion for pyelonephritis    -Continue flomax while inpatient  -Continue IV fluids while inpatient  -Excellent hydration outpatient  -Follow up urine culture  27 weeks gestation of pregnancy   labor workup negative on admission  NST daily  Continue routine prenatal care  Constipation  S/p milk of magnesia and soap suds enema with multiple bowel movements overnight  Miralax BID  Encourage high fiber diet and excellent hydration    Disposition: anticipate discharge 3/25    OB L&D Progress Note  Subjective   Merline is feeling well and reports that her symptoms have resolved. She had multiple bowel movements overnight and was able to sleep well. She denies abdominal or flank pain, contractions, leakage of fluid, vaginal bleeding, and she is feeling good fetal movement. She denies fevers/chills, chest pain, and shortness of breath.    Fetal monitoring: daily NST to be done prior to discharge      Objective :  Temp:  [97.5 °F (36.4 °C)-98 °F (36.7 °C)] 98 °F (36.7 °C)  HR:  [] 102  BP: ()/(48-59) 99/54  Resp:  [16-18] 16    Physical Exam  Vitals and nursing note reviewed.   Constitutional:       General: She is not in acute distress.     Appearance: She is well-developed.   HENT:      Head: Normocephalic and atraumatic.   Eyes:      Conjunctiva/sclera: Conjunctivae normal.   Cardiovascular:      Rate and Rhythm: Normal rate and regular rhythm.      Heart sounds: No murmur heard.  Pulmonary:      Effort: Pulmonary effort is normal. No respiratory distress.      Breath sounds: Normal breath sounds.   Abdominal:      Palpations: Abdomen is soft.      Tenderness:  There is no abdominal tenderness. There is no right CVA tenderness or left CVA tenderness.      Comments: Gravid   Musculoskeletal:         General: No swelling.      Cervical back: Neck supple.   Skin:     General: Skin is warm and dry.      Capillary Refill: Capillary refill takes less than 2 seconds.   Neurological:      General: No focal deficit present.      Mental Status: She is alert and oriented to person, place, and time. Mental status is at baseline.   Psychiatric:         Mood and Affect: Mood normal.             Wanda Clements MD  PGY 2, Obstetrics and Gynecology  3/25/2025  6:41 AM

## 2025-03-25 NOTE — NURSING NOTE
Pt tells RN that she has had multiple bowel movements overnight. She is feeling much better with little to no pain.

## 2025-03-25 NOTE — ASSESSMENT & PLAN NOTE
S/p milk of magnesia and soap suds enema with multiple bowel movements overnight  Miralax BID  Encourage high fiber diet and excellent hydration

## 2025-03-27 ENCOUNTER — RESULTS FOLLOW-UP (OUTPATIENT)
Dept: LABOR AND DELIVERY | Facility: HOSPITAL | Age: 30
End: 2025-03-27

## 2025-03-27 ENCOUNTER — HOSPITAL ENCOUNTER (OUTPATIENT)
Facility: HOSPITAL | Age: 30
Discharge: HOME | End: 2025-03-27
Attending: OBSTETRICS & GYNECOLOGY | Admitting: OBSTETRICS & GYNECOLOGY
Payer: MEDICARE

## 2025-03-27 ENCOUNTER — NURSE TRIAGE (OUTPATIENT)
Age: 30
End: 2025-03-27

## 2025-03-27 VITALS
DIASTOLIC BLOOD PRESSURE: 58 MMHG | TEMPERATURE: 98.5 F | HEART RATE: 96 BPM | SYSTOLIC BLOOD PRESSURE: 97 MMHG | RESPIRATION RATE: 18 BRPM

## 2025-03-27 DIAGNOSIS — O23.42 URINARY TRACT INFECTION IN MOTHER DURING SECOND TRIMESTER OF PREGNANCY: Primary | ICD-10-CM

## 2025-03-27 DIAGNOSIS — Z3A.27 27 WEEKS GESTATION OF PREGNANCY: ICD-10-CM

## 2025-03-27 LAB — BACTERIA UR CULT: ABNORMAL

## 2025-03-27 PROCEDURE — 99213 OFFICE O/P EST LOW 20 MIN: CPT

## 2025-03-27 PROCEDURE — NC001 PR NO CHARGE: Performed by: OBSTETRICS & GYNECOLOGY

## 2025-03-27 PROCEDURE — 76815 OB US LIMITED FETUS(S): CPT

## 2025-03-27 PROCEDURE — 59025 FETAL NON-STRESS TEST: CPT

## 2025-03-27 RX ORDER — CEPHALEXIN 500 MG/1
500 CAPSULE ORAL EVERY 6 HOURS SCHEDULED
Qty: 28 CAPSULE | Refills: 0 | Status: SHIPPED | OUTPATIENT
Start: 2025-03-27 | End: 2025-04-03

## 2025-03-27 NOTE — PROCEDURES
Merline Madden, a  at 27w3d with an FAHEEM of 2025, by Ultrasound, was seen at [facility] for the following procedure(s): $Procedure Type: NST, KAN]    Nonstress Test  Reason for NST: Decreased Fetal Movement  Variability: Moderate  Decelerations: None  Accelerations: Yes  Acoustic Stimulator: No  Baseline: 135 BPM  Uterine Irritability: No  Contractions: Not present    4 Quadrant KAN  KAN Q1 (cm): 2.8 cm  KAN Q2 (cm): 3.5 cm  KAN Q3 (cm): 5.4 cm  KAN Q4 (cm): 3.9 cm  KAN TOTAL (cm): 15.6 cm  LVP (cm): 5.4 cm              Interpretation  Nonstress Test Interpretation: Reactive  Overall Impression: Reassuring    A/P: 31yo  female at 27w3d presenting with subjectively decreased fetal movement but meeting fetal kick counts. NST/KAN reassuring, incidentally completed BPP . Urine culture from prior triage presentation resulted with sensitivities today 3/27. Asymptomatic - no dysuria, no CVA tenderness elicited. VSS.  Reviewed fetal kick counts and encouraged follow-up with ObGyn  Next appointment scheduled  with Dr. Dey  Script sent for Keflex 500mg q6h x7 days  Strict return precautions reviewed for  labor, decreased fetal movement, or return/worsening of symptoms  Stable for discharge

## 2025-03-27 NOTE — PROGRESS NOTES
L&D Triage Note - OB/GYN  Merline Madden 30 y.o. female MRN: [id number]  Unit/Bed#:  TRIAGE  Encounter: [id number]      ASSESSMENT:    Merline Madden is a 30 y.o.  at 27w3d with concern for decreased fetal movements, which is thankfully not the case upon detailed history and abdominal ultrasound which demonstrates frequent fetal movements and reassuring nonstress test.  Patient is otherwise well and requires 7-day course of antibiotics for UTI    PLAN:    Uncomplicated UTI given resolution of flank pain: Cephalexin for 7d  Continue routine prenatal care  Discharge from OB triage with  labor precautions    - Reviewed rupture of membranes, false vs true labor, decreased fetal movement, and vaginal bleeding   - Pt to call provider with any concerns and follow up at her next scheduled prenatal appointment    - Case discussed with Dr. Waters    SUBJECTIVE:   [id number]  Merline Madden 30 y.o.  at 27w3d with an Estimated Date of Delivery: 25. Complains of decreased fetal movements relative to her baseline, though was not well able to qualify how many movements she would feel normally.  States that she felt 10 movements over the past 2 hours.  States she had a vaginal infection for which she received MetroGel and has been using it.  Her back pain from her previous encounter on 3/24/2025 has completely resolved, she has no urinary symptoms.  Denies visual changes severe headache not relieved by Tylenol, chest pain shortness of breath nominal pain nausea vomiting diarrhea leg swelling.    Her current obstetrical history is significant for hx bariatric surgery, hx HSV 2 seropositive with prior vulvovaginitis, + hep C antibody with negative viral load in quantitative test, rubella non-immune, pyelectasis of fetus on prenatal US R kidney 6mm at 26w    Her past obstetrical history is significant for demise 40w0d 5lb 10 oz at birth suspected 2/2  cord compression from early labor at home, plus 10 minute shoulder dystocia requiring Jordon, suprapubic, delivery of posterior arm & rotational maneuvers.    Contractions: none  Leakage of fluid: none  Vaginal Bleeding: none  Fetal movement: present 10 per 2 hours    ROS:  Review of Systems   Constitutional:  Negative for chills and fever.   Eyes:  Negative for pain and visual disturbance.   Respiratory:  Negative for cough and shortness of breath.    Cardiovascular:  Negative for chest pain, palpitations and leg swelling.   Gastrointestinal:  Negative for abdominal pain, constipation, diarrhea, nausea and vomiting.   Genitourinary:  Negative for decreased urine volume, difficulty urinating, dysuria, flank pain, frequency, hematuria, vaginal bleeding, vaginal discharge and vaginal pain.   Musculoskeletal:  Negative for arthralgias and back pain.   Skin:  Negative for color change and rash.   Neurological:  Negative for headaches.   Psychiatric/Behavioral:  Negative for agitation and confusion.    All other systems reviewed and are negative.        OBJECTIVE:  Vitals:    03/27/25 1100   BP: 97/58   Pulse: 96   Resp:    Temp:        General Physical Exam:  Physical Exam  Vitals and nursing note reviewed.   Constitutional:       General: She is not in acute distress.     Appearance: She is well-developed.   HENT:      Head: Normocephalic and atraumatic.      Nose: No rhinorrhea.   Eyes:      General: No scleral icterus.        Right eye: No discharge.         Left eye: No discharge.      Conjunctiva/sclera: Conjunctivae normal.   Cardiovascular:      Rate and Rhythm: Normal rate and regular rhythm.      Pulses: Normal pulses.      Heart sounds: Normal heart sounds. No murmur heard.     No friction rub. No gallop.   Pulmonary:      Effort: Pulmonary effort is normal. No respiratory distress.      Breath sounds: Normal breath sounds. No stridor. No wheezing, rhonchi or rales.   Abdominal:      General: Bowel sounds  are normal. There is no distension.      Palpations: Abdomen is soft. There is no mass.      Tenderness: There is no abdominal tenderness. There is no guarding or rebound.      Comments: Gravid uterus at appropriate height for gestational age   Musculoskeletal:         General: No swelling.      Cervical back: Neck supple.   Skin:     General: Skin is warm and dry.      Coloration: Skin is not jaundiced or pale.   Neurological:      Mental Status: She is alert.      Comments: No facial droop, slurred speech or gross focal deficits noted           Cervical Exam deferred 2/2 lack of leakage of fluid or vaginal bleeding       FHT:  Baseline Rate (FHR): 130 bpm  Variability: Moderate  Accelerations: 10 x 10 (<32 weeks)  Decelerations: None  FHR Category: Category I    TOCO:   Contraction Frequency (minutes): x1  Contraction Duration (seconds): 50  Contraction Intensity: Mild    Lab Results   Component Value Date    WBC 10.35 (H) 03/25/2025    HGB 9.9 (L) 03/25/2025    HCT 30.6 (L) 03/25/2025     03/25/2025     Lab Results   Component Value Date     06/14/2018    K 3.4 (L) 03/24/2025     03/24/2025    CO2 26 03/24/2025    BUN 12 03/24/2025    CREATININE 0.66 03/24/2025    GLUCOSE 97 06/14/2018    AST 17 08/03/2024    ALT 9 08/03/2024       Urine Culture + for proteus mirabillis >100,000 CFU, resistant to nitrofurantoin & tetracycline    Imaging:    3/24/25 US kidney/bladder showed mild L hydronephrosis without visible calculus          Abd. US   KAN      - Q1 2.75cm     - Q2 3.47cm     - Q3 5.39cm     - Q4 3.85cm     - Total: 15.46cm   Placenta: Anterior   Presentation: Breech   Frequent fetal movements noted    Ozzy Pollard MD,  3/27/2025 12:06 PM    Ozzy SEALS, Family Medicine PGY-3 attest that I have seen and cared for the patient under the supervision of Dr. Waters.

## 2025-03-27 NOTE — TELEPHONE ENCOUNTER
"Int #[id number]    FOLLOW UP: L&D for FM     REASON FOR CONVERSATION: Decreased Fetal Movement    SYMPTOMS: Decreased fetal movement, hx of fetal demise    OTHER: 27w3d -Patient complains of less strong fetal movements since yesterday. Has not been able to complete kick counts. Hx of fetal demise at 40w4d. Patient anxious. Advised to report to L&D for monitoring of fetus. Patient verbalizes understanding and will report now.     DISPOSITION: Go to LD Now (Or to Office With PCP Approval)    Reason for Disposition   Pregnant 23 or more weeks and mother thinks baby is moving less today (e.g., even if kick count is normal or not performed)  (Exception: Mother was distracted by other activities.)    Answer Assessment - Initial Assessment Questions  1. FETAL MOVEMENT: \"Has the baby's movement decreased or changed significantly from normal?\" (e.g., yes, no; describe) \"When was the last time you felt the baby move?\" (e.g., minutes, hours)      Changes in strength of FM; last felt 20 mins ago  2. FAHEEM: \"What date are you expecting to deliver?\"       25  3. PREGNANCY: \"How many weeks pregnant are you?\" \"How has the pregnancy been going?\"      27w3d  4. OTHER SYMPTOMS: \"Do you have any other symptoms?\" (e.g., abdomen pain, fever, leaking fluid from vagina, vaginal bleeding, widespread itching, etc.)      denies    Protocols used: Pregnancy - Decreased or Abnormal Fetal Movement-Adult-[place]    "

## 2025-04-02 ENCOUNTER — ROUTINE PRENATAL (OUTPATIENT)
Dept: OBGYN CLINIC | Facility: CLINIC | Age: 30
End: 2025-04-02
Payer: MEDICARE

## 2025-04-02 VITALS
OXYGEN SATURATION: 99 % | HEIGHT: 63 IN | DIASTOLIC BLOOD PRESSURE: 54 MMHG | SYSTOLIC BLOOD PRESSURE: 98 MMHG | WEIGHT: 204 LBS | BODY MASS INDEX: 36.14 KG/M2 | HEART RATE: 94 BPM

## 2025-04-02 DIAGNOSIS — O09.93 ENCOUNTER FOR SUPERVISION OF HIGH RISK PREGNANCY IN THIRD TRIMESTER, ANTEPARTUM: Primary | ICD-10-CM

## 2025-04-02 DIAGNOSIS — O09.899 RUBELLA NON-IMMUNE STATUS, ANTEPARTUM: ICD-10-CM

## 2025-04-02 DIAGNOSIS — O35.EXX0 PYELECTASIS OF FETUS ON PRENATAL ULTRASOUND: ICD-10-CM

## 2025-04-02 DIAGNOSIS — Z28.39 RUBELLA NON-IMMUNE STATUS, ANTEPARTUM: ICD-10-CM

## 2025-04-02 DIAGNOSIS — O09.292 IUGR (INTRAUTERINE GROWTH RETARDATION) & STILLBIRTH HISTORY, PREGNANT, SECOND TRIMESTER: ICD-10-CM

## 2025-04-02 DIAGNOSIS — O23.42 URINARY TRACT INFECTION IN MOTHER DURING SECOND TRIMESTER OF PREGNANCY: ICD-10-CM

## 2025-04-02 DIAGNOSIS — O99.843 PREGNANCY AFFECTED BY PREVIOUS BARIATRIC SURGERY, CURRENTLY IN THIRD TRIMESTER: ICD-10-CM

## 2025-04-02 DIAGNOSIS — Z3A.28 28 WEEKS GESTATION OF PREGNANCY: ICD-10-CM

## 2025-04-02 PROCEDURE — 99213 OFFICE O/P EST LOW 20 MIN: CPT | Performed by: OBSTETRICS & GYNECOLOGY

## 2025-04-02 NOTE — PROGRESS NOTES
[employer]  #[id number] used for this encounter and consents    Assessment & Plan  30 y.o.  at 28w2d presenting for routine prenatal visit.     Problem List       Herpes simplex vulvovaginitis    Overview   1st episode 2022 pos for HSV 2.         ASCUS with positive high risk HPV cervical    Overview   In 2019  Pap 2024 NILM/HPV neg         IUFD at 20 weeks or more of gestation    Overview   Prior term fetal demise at 40w0d that weighed 5 pounds 10 ounces at birth.  During labor the umbilical cord was felt at the internal os suggesting demise may have been caused from compression of the cord during early labor at home. At birth there was a 10-minute shoulder dystocia requiring Jordon, suprapubic, delivery of posterior arm and rotational maneuvers were performed. Baby was then delivered with Josefina maneuver.     Recommend twice weekly nonstress test and weekly fluid scans starting at 32 weeks  Patient desires elective primary  delivery         Previous bariatric surgery complicating pregnancy    Overview   Surgery: Sleeve 8/15/23 (in active weight loss phase)/Fetal growth monthly         HSV-2 seropositive    Overview   Valtrex at 36 weeks [ ]  Reports new HSV lesions, will send Valtrex         28 weeks gestation of pregnancy    Overview   Hepatitis B equivocal, rubella non immune  LDASA  Patient desires elective primary  section, schedule pending growth US [ ]  MSAFP ordered  Delivery consent [x]  Tdap [ ] - wants at next visit  GBS [ ]  Contraception [ ]         Hepatitis C antibody positive in blood    Overview   + antibody on prenatal labs  F/u quant  Also had +antibody with negative quant in prior pregnancy         Rubella non-immune status, antepartum    Overview   MMR postpartum         Bacterial vaginosis in pregnancy    Anxiety    Pyelectasis of fetus on prenatal ultrasound    Overview   Right sided measured 6 mm at 26 weeks           IUGR (intrauterine growth retardation)  "& stillbirth history, pregnant, second trimester    Overview   F/u repeat US         Urinary tract infection in mother during second trimester of pregnancy    Overview   UTI w/ Proteus  Keflex prescribed          Other Visit Diagnoses         Encounter for supervision of high risk pregnancy in third trimester, antepartum    -  Primary          ____________________________________________________________  Subjective  She is without complaint.   She denies contractions, loss of fluid, or vaginal bleeding.   She feels regular fetal movements.     Objective  BP 98/54 (BP Location: Left arm, Patient Position: Sitting, Cuff Size: Large)   Pulse 94   Ht 5' 3\" (1.6 m)   Wt 92.5 kg (204 lb)   LMP 09/09/2024   SpO2 99%   BMI 36.14 kg/m²   FHR: 145 bpm  Fundal height 29 cm    Physical Exam  Constitutional:       Appearance: She is well-developed.   Cardiovascular:      Rate and Rhythm: Normal rate.   Pulmonary:      Effort: Pulmonary effort is normal. No respiratory distress.   Abdominal:      Palpations: Abdomen is soft.      Tenderness: There is no abdominal tenderness.   Skin:     General: Skin is warm and dry.          Patient's Active Problem List  Patient Active Problem List   Diagnosis    Herpes simplex vulvovaginitis    ASCUS with positive high risk HPV cervical    IUFD at 20 weeks or more of gestation    Previous bariatric surgery complicating pregnancy    HSV-2 seropositive    28 weeks gestation of pregnancy    Hepatitis C antibody positive in blood    Rubella non-immune status, antepartum    Bacterial vaginosis in pregnancy    Anxiety    Pyelectasis of fetus on prenatal ultrasound    IUGR (intrauterine growth retardation) & stillbirth history, pregnant, second trimester    Urinary tract infection in mother during second trimester of pregnancy           Dee Dey MD  4/2/2025  1:42 PM          "

## 2025-04-15 ENCOUNTER — APPOINTMENT (OUTPATIENT)
Dept: LAB | Facility: HOSPITAL | Age: 30
End: 2025-04-15
Payer: MEDICARE

## 2025-04-15 DIAGNOSIS — O09.92 ENCOUNTER FOR SUPERVISION OF HIGH RISK PREGNANCY IN SECOND TRIMESTER, ANTEPARTUM: ICD-10-CM

## 2025-04-15 DIAGNOSIS — R76.8 HEPATITIS C ANTIBODY POSITIVE IN BLOOD: ICD-10-CM

## 2025-04-15 DIAGNOSIS — O09.93 ENCOUNTER FOR SUPERVISION OF HIGH RISK PREGNANCY IN THIRD TRIMESTER, ANTEPARTUM: ICD-10-CM

## 2025-04-15 DIAGNOSIS — O36.80X1 ENCOUNTER TO DETERMINE FETAL VIABILITY OF PREGNANCY, FETUS 1: ICD-10-CM

## 2025-04-15 LAB
ERYTHROCYTE [DISTWIDTH] IN BLOOD BY AUTOMATED COUNT: 12.8 % (ref 11.6–15.1)
GLUCOSE 1H P 50 G GLC PO SERPL-MCNC: 160 MG/DL (ref 70–134)
HCT VFR BLD AUTO: 33.5 % (ref 34.8–46.1)
HGB BLD-MCNC: 10.6 G/DL (ref 11.5–15.4)
MCH RBC QN AUTO: 25.8 PG (ref 26.8–34.3)
MCHC RBC AUTO-ENTMCNC: 31.6 G/DL (ref 31.4–37.4)
MCV RBC AUTO: 82 FL (ref 82–98)
PLATELET # BLD AUTO: 286 THOUSANDS/UL (ref 149–390)
PMV BLD AUTO: 10.7 FL (ref 8.9–12.7)
RBC # BLD AUTO: 4.11 MILLION/UL (ref 3.81–5.12)
WBC # BLD AUTO: 8.41 THOUSAND/UL (ref 4.31–10.16)

## 2025-04-15 PROCEDURE — 82950 GLUCOSE TEST: CPT

## 2025-04-15 PROCEDURE — 87521 HEPATITIS C PROBE&RVRS TRNSC: CPT

## 2025-04-15 PROCEDURE — 85027 COMPLETE CBC AUTOMATED: CPT

## 2025-04-15 PROCEDURE — 36415 COLL VENOUS BLD VENIPUNCTURE: CPT

## 2025-04-15 PROCEDURE — 87389 HIV-1 AG W/HIV-1&-2 AB AG IA: CPT

## 2025-04-15 PROCEDURE — 86780 TREPONEMA PALLIDUM: CPT

## 2025-04-15 PROCEDURE — 87522 HEPATITIS C REVRS TRNSCRPJ: CPT

## 2025-04-16 LAB
HCV RNA SERPL NAA+PROBE-ACNC: NOT DETECTED K[IU]/ML
HIV 1+2 AB+HIV1 P24 AG SERPL QL IA: NORMAL
TREPONEMA PALLIDUM IGG+IGM AB [PRESENCE] IN SERUM OR PLASMA BY IMMUNOASSAY: NORMAL

## 2025-04-17 ENCOUNTER — ROUTINE PRENATAL (OUTPATIENT)
Dept: OBGYN CLINIC | Facility: CLINIC | Age: 30
End: 2025-04-17
Payer: MEDICARE

## 2025-04-17 VITALS
WEIGHT: 207 LBS | HEIGHT: 63 IN | BODY MASS INDEX: 36.68 KG/M2 | HEART RATE: 95 BPM | SYSTOLIC BLOOD PRESSURE: 90 MMHG | OXYGEN SATURATION: 99 % | DIASTOLIC BLOOD PRESSURE: 52 MMHG

## 2025-04-17 DIAGNOSIS — O09.93 ENCOUNTER FOR SUPERVISION OF HIGH RISK PREGNANCY IN THIRD TRIMESTER, ANTEPARTUM: Primary | ICD-10-CM

## 2025-04-17 DIAGNOSIS — O09.292 IUGR (INTRAUTERINE GROWTH RETARDATION) & STILLBIRTH HISTORY, PREGNANT, SECOND TRIMESTER: ICD-10-CM

## 2025-04-17 DIAGNOSIS — O36.4XX0 IUFD AT 20 WEEKS OR MORE OF GESTATION: ICD-10-CM

## 2025-04-17 DIAGNOSIS — Z28.39 RUBELLA NON-IMMUNE STATUS, ANTEPARTUM: ICD-10-CM

## 2025-04-17 DIAGNOSIS — Z3A.30 30 WEEKS GESTATION OF PREGNANCY: ICD-10-CM

## 2025-04-17 DIAGNOSIS — O09.899 RUBELLA NON-IMMUNE STATUS, ANTEPARTUM: ICD-10-CM

## 2025-04-17 DIAGNOSIS — O35.EXX0 PYELECTASIS OF FETUS ON PRENATAL ULTRASOUND: ICD-10-CM

## 2025-04-17 PROCEDURE — 99213 OFFICE O/P EST LOW 20 MIN: CPT | Performed by: OBSTETRICS & GYNECOLOGY

## 2025-04-17 RX ORDER — BLOOD SUGAR DIAGNOSTIC
STRIP MISCELLANEOUS
Qty: 50 EACH | Refills: 1 | Status: SHIPPED | OUTPATIENT
Start: 2025-04-17

## 2025-04-17 RX ORDER — BLOOD-GLUCOSE METER
EACH MISCELLANEOUS 4 TIMES DAILY
Qty: 1 KIT | Refills: 0 | Status: SHIPPED | OUTPATIENT
Start: 2025-04-17

## 2025-04-17 NOTE — PROGRESS NOTES
Assessment & Plan  30 y.o.  at 30w3d presenting for routine prenatal visit.     Problem List       Herpes simplex vulvovaginitis    Overview   1st episode 2022 pos for HSV 2.         ASCUS with positive high risk HPV cervical    Overview   In 2019  Pap 2024 NILM/HPV neg         IUFD at 20 weeks or more of gestation    Overview   Prior term fetal demise at 40w0d that weighed 5 pounds 10 ounces at birth.  During labor the umbilical cord was felt at the internal os suggesting demise may have been caused from compression of the cord during early labor at home. At birth there was a 10-minute shoulder dystocia requiring Jordon, suprapubic, delivery of posterior arm and rotational maneuvers were performed. Baby was then delivered with Josefina maneuver.     Recommend twice weekly nonstress test and weekly fluid scans starting at 32 weeks  Patient desires elective primary  delivery         Previous bariatric surgery complicating pregnancy    Overview   Surgery: Sleeve 8/15/23 (in active weight loss phase)/Fetal growth monthly         HSV-2 seropositive    Overview   Valtrex at 36 weeks [ ]  Reports new HSV lesions, will send Valtrex         30 weeks gestation of pregnancy    Overview   Hepatitis B equivocal, rubella non immune  LDASA  Patient desires elective primary  section, schedule pending growth US, patient prefers 38w if possible [ ]  MSAFP ordered  Delivery consent [x]  Tdap [ ] - declines  Elevated 1 hour GTT - checked fingersticks, passed  GBS [ ]  Contraception [ ]         Hepatitis C antibody positive in blood    Overview   + antibody on prenatal labs  F/u quant  Also had +antibody with negative quant in prior pregnancy         Rubella non-immune status, antepartum    Overview   MMR postpartum         Bacterial vaginosis in pregnancy    Anxiety    Pyelectasis of fetus on prenatal ultrasound    Overview   Right sided measured 6 mm at 26 weeks           IUGR (intrauterine growth  "retardation) & stillbirth history, pregnant, second trimester    Overview   F/u repeat US         Urinary tract infection in mother during second trimester of pregnancy    Overview   UTI w/ Proteus  Keflex prescribed          Other Visit Diagnoses         Encounter for supervision of high risk pregnancy in third trimester, antepartum    -  Primary          ____________________________________________________________  Subjective  She is without complaint.   She denies contractions, loss of fluid, or vaginal bleeding.   She feels regular fetal movements.     Objective  BP 90/52 (BP Location: Right arm, Patient Position: Sitting, Cuff Size: Large)   Pulse 95   Ht 5' 3\" (1.6 m)   Wt 93.9 kg (207 lb)   LMP 09/09/2024   SpO2 99%   BMI 36.67 kg/m²   FHR: 155 bpm  Fundal height 31 cm    Physical Exam  Constitutional:       Appearance: She is well-developed.   Cardiovascular:      Rate and Rhythm: Normal rate.   Pulmonary:      Effort: Pulmonary effort is normal. No respiratory distress.   Abdominal:      Palpations: Abdomen is soft.      Tenderness: There is no abdominal tenderness.   Skin:     General: Skin is warm and dry.          Patient's Active Problem List  Patient Active Problem List   Diagnosis    Herpes simplex vulvovaginitis    ASCUS with positive high risk HPV cervical    IUFD at 20 weeks or more of gestation    Previous bariatric surgery complicating pregnancy    HSV-2 seropositive    30 weeks gestation of pregnancy    Hepatitis C antibody positive in blood    Rubella non-immune status, antepartum    Bacterial vaginosis in pregnancy    Anxiety    Pyelectasis of fetus on prenatal ultrasound    IUGR (intrauterine growth retardation) & stillbirth history, pregnant, second trimester    Urinary tract infection in mother during second trimester of pregnancy           Dee Dey MD  4/17/2025  1:28 PM          "

## 2025-04-18 ENCOUNTER — HOSPITAL ENCOUNTER (OUTPATIENT)
Facility: HOSPITAL | Age: 30
Discharge: HOME | End: 2025-04-19
Attending: OBSTETRICS & GYNECOLOGY | Admitting: OBSTETRICS & GYNECOLOGY
Payer: MEDICARE

## 2025-04-18 ENCOUNTER — TELEPHONE (OUTPATIENT)
Dept: PERINATAL CARE | Facility: OTHER | Age: 30
End: 2025-04-18

## 2025-04-18 VITALS
OXYGEN SATURATION: 99 % | SYSTOLIC BLOOD PRESSURE: 92 MMHG | RESPIRATION RATE: 18 BRPM | TEMPERATURE: 98.4 F | DIASTOLIC BLOOD PRESSURE: 45 MMHG | HEART RATE: 90 BPM

## 2025-04-18 NOTE — TELEPHONE ENCOUNTER
Called and left voicemail with [employer]([phone])for patient informing her of an upcoming telemedicine vist on  April 24, 2025 at 1:00PM at our [place] office. Requested she give our office a call back at [phone] with any questions or to reschedule.

## 2025-04-19 PROCEDURE — NC001 PR NO CHARGE: Performed by: OBSTETRICS & GYNECOLOGY

## 2025-04-19 PROCEDURE — 99212 OFFICE O/P EST SF 10 MIN: CPT

## 2025-04-19 NOTE — PROGRESS NOTES
L&D Triage Note - OB/GYN  Merline Madden 30 y.o. female MRN: [id number]  Unit/Bed#:  TRIAGE  Encounter: [id number]      ASSESSMENT:    Merline Madden is a 30 y.o.  at 30w5d who was evaluated today for decreased fetal movement. NST is reactive and KAN within normal limits with fetal movement seen on ultrasound. Patient feeling fetal movement in triage. Safe for discharge to home with return precautions reviewed.    PLAN:    1) Decreased Fetal Movement  - KAN: 10.79 cm  - NST reactive   - Patient feeling movement in triage  - Fetal movement visualized on ultrasound      2) 30 weeks gestation of pregnancy  - Continue routine prenatal care  - Discharge from OB triage with  labor precautions    - Reviewed rupture of membranes, false vs true labor, decreased fetal movement, and vaginal bleeding   - Pt to call provider with any concerns and follow up at her next scheduled prenatal appointment    - Case discussed with Dr. Stringer    SUBJECTIVE:    Merline Madden 30 y.o.  at 30w5d with an Estimated Date of Delivery: 25 presenting with decreased fetal movement.  He is usually very active at night but her baby has not been moving much today.  She denies any recent falls or trauma.  Otherwise she feels well with no questions or concerns.    Her past obstetrical history is significant for IUFD at 40 weeks with SD    Contractions: Denies  Leakage of fluid: Denies  Vaginal Bleeding: Denies  Fetal movement: present now    OBJECTIVE:    Vitals:    25 2310   BP: (!) 92/45   Pulse: 90   Resp: 18   Temp: 98.4 °F (36.9 °C)   SpO2: 99%       ROS:  Constitutional: Negative  Respiratory: Negative  Cardiovascular: Negative   Gastrointestinal: Negative    General Physical Exam:  General: Well appearing, no distress  Respiratory: Unlabored breathing  Cardiovascular: Regular rate.  Abdomen: Soft, gravid, nontender  Fundal Height: Appropriate for gestational  age.  Extremities: Warm and well perfused.  Non tender.    Cervical Exam  SVE: deferred    Fetal monitoring:  Fetal heart rate: Baseline Rate (FHR): 140 bpm  Variability: Moderate  Accelerations: 10 x 10 (<32 weeks), At variable times  Decelerations: None  [place]: Contraction Frequency (minutes): n/a    Imaging:        Abd. US   KAN      - Q1 2.88 cm     - Q2 3.26 cm     - Q3 0.61 cm     - Q4 4.04 cm     - Total: 10.79 cm   Presentation: transverse    Zoila Abraham MD  4/19/2025  4:52 AM

## 2025-04-22 ENCOUNTER — ULTRASOUND (OUTPATIENT)
Age: 30
End: 2025-04-22
Attending: OBSTETRICS & GYNECOLOGY
Payer: MEDICARE

## 2025-04-22 ENCOUNTER — APPOINTMENT (OUTPATIENT)
Age: 30
End: 2025-04-22
Payer: MEDICARE

## 2025-04-22 VITALS
HEART RATE: 104 BPM | HEIGHT: 63 IN | BODY MASS INDEX: 36.43 KG/M2 | DIASTOLIC BLOOD PRESSURE: 56 MMHG | WEIGHT: 205.6 LBS | SYSTOLIC BLOOD PRESSURE: 98 MMHG

## 2025-04-22 DIAGNOSIS — Z3A.31 31 WEEKS GESTATION OF PREGNANCY: Primary | ICD-10-CM

## 2025-04-22 DIAGNOSIS — E66.811 OBESITY, CLASS I, BMI 30-34.9: ICD-10-CM

## 2025-04-22 DIAGNOSIS — O99.843 PREGNANCY AFFECTED BY PREVIOUS BARIATRIC SURGERY, CURRENTLY IN THIRD TRIMESTER: ICD-10-CM

## 2025-04-22 DIAGNOSIS — O43.199 MARGINAL INSERTION OF UMBILICAL CORD AFFECTING MANAGEMENT OF MOTHER: ICD-10-CM

## 2025-04-22 DIAGNOSIS — Z87.59 HISTORY OF IUFD: ICD-10-CM

## 2025-04-22 PROCEDURE — 99214 OFFICE O/P EST MOD 30 MIN: CPT | Performed by: OBSTETRICS & GYNECOLOGY

## 2025-04-22 PROCEDURE — 59025 FETAL NON-STRESS TEST: CPT | Performed by: OBSTETRICS & GYNECOLOGY

## 2025-04-22 PROCEDURE — 76816 OB US FOLLOW-UP PER FETUS: CPT | Performed by: OBSTETRICS & GYNECOLOGY

## 2025-04-22 NOTE — PROGRESS NOTES
A fetal ultrasound and NST were completed. See Ob procedures in Epic for an interpretation and recommendations. Do not hesitate to contact us in [facility] if you have questions.    Rod Green MD, MSCE  Maternal Fetal Medicine

## 2025-04-22 NOTE — PROGRESS NOTES
Repeat Non-Stress Testing:    Patient verbalizes +FM. Pt denies ALL:               Leaking of fluid   Contractions   Vaginal bleeding   Decreased fetal movement    Patient is performing daily kick counts. Patient has no questions or concerns.   NST strip reviewed by Dr. Green in-person.

## 2025-04-24 ENCOUNTER — ROUTINE PRENATAL (OUTPATIENT)
Age: 30
End: 2025-04-24
Attending: OBSTETRICS & GYNECOLOGY
Payer: MEDICARE

## 2025-04-24 VITALS
BODY MASS INDEX: 36.32 KG/M2 | HEIGHT: 63 IN | DIASTOLIC BLOOD PRESSURE: 52 MMHG | SYSTOLIC BLOOD PRESSURE: 92 MMHG | WEIGHT: 205 LBS | HEART RATE: 104 BPM

## 2025-04-24 DIAGNOSIS — Z3A.31 31 WEEKS GESTATION OF PREGNANCY: ICD-10-CM

## 2025-04-24 DIAGNOSIS — Z87.59 HISTORY OF IUFD: Primary | ICD-10-CM

## 2025-04-24 PROCEDURE — 59025 FETAL NON-STRESS TEST: CPT | Performed by: OBSTETRICS & GYNECOLOGY

## 2025-04-24 NOTE — PROGRESS NOTES
Nonstress test at 31-3/7 weeks due to history of stillbirth in a previous pregnancy.    Interpretation reactive.    Plan.    Twice a week nonstress test once a week KAN Daily fetal movement counts    Rod Green MD, MSc    Maternal-fetal medicine

## 2025-04-24 NOTE — PROGRESS NOTES
Repeat Non-Stress Testing:    Patient verbalizes +FM. Pt denies ALL:               Leaking of fluid   Contractions   Vaginal bleeding   Decreased fetal movement    Patient is performing daily kick counts. Patient has no questions or concerns.   NST strip reviewed by Dr. Green via teams virtual rounding.    The patient was located in [place] at the time of the visit, a state in which Dr. Green holds an active license. Patient acknowledged consent and understanding of privacy and security of the video platform. The patient has agreed to participate and understands they can discontinue the visit at any time. The patient was counseled via synchronous telemedicine encounter using Teams Virtual Rounding.

## 2025-04-27 NOTE — PROGRESS NOTES
Please refer to the [facility] ultrasound report in Ob Procedures for additional information regarding today's visit

## 2025-04-29 ENCOUNTER — TELEPHONE (OUTPATIENT)
Age: 30
End: 2025-04-29

## 2025-04-29 ENCOUNTER — ULTRASOUND (OUTPATIENT)
Age: 30
End: 2025-04-29
Attending: OBSTETRICS & GYNECOLOGY
Payer: MEDICARE

## 2025-04-29 VITALS
SYSTOLIC BLOOD PRESSURE: 106 MMHG | HEART RATE: 88 BPM | WEIGHT: 206.8 LBS | BODY MASS INDEX: 36.64 KG/M2 | HEIGHT: 63 IN | DIASTOLIC BLOOD PRESSURE: 62 MMHG

## 2025-04-29 DIAGNOSIS — Z3A.32 32 WEEKS GESTATION OF PREGNANCY: Primary | ICD-10-CM

## 2025-04-29 DIAGNOSIS — O09.293 HISTORY OF STILLBIRTH IN CURRENTLY PREGNANT PATIENT, THIRD TRIMESTER: ICD-10-CM

## 2025-04-29 PROCEDURE — 76815 OB US LIMITED FETUS(S): CPT | Performed by: OBSTETRICS & GYNECOLOGY

## 2025-04-29 PROCEDURE — 59025 FETAL NON-STRESS TEST: CPT | Performed by: OBSTETRICS & GYNECOLOGY

## 2025-04-29 NOTE — TELEPHONE ENCOUNTER
Called patient for third trimester assessment.   Left message.  Sent follow up Mychart message.

## 2025-04-29 NOTE — PROGRESS NOTES
Repeat Non-Stress Testing:    Patient verbalizes +FM. Pt denies ALL:               Leaking of fluid   Contractions   Vaginal bleeding   Decreased fetal movement    Patient is performing daily kick counts. Patient has no questions or concerns.   NST strip reviewed by Dr. Martin in-person.

## 2025-04-29 NOTE — LETTER
April 29, 2025     Dee Dey MD  [address] [address]    Patient: Merline Madden   YOB: 1995   Date of Visit: 4/29/2025       Dear Dr. Dee Dey MD:    Thank you for referring Merline Madden to me for evaluation. Below are my notes for this consultation.    If you have questions, please do not hesitate to call me. I look forward to following your patient along with you.         Sincerely,        Heath Martin MD        CC: No Recipients    Heath Martin MD  4/27/2025  1:18 PM  Sign when Signing Visit  Please refer to the [facility] ultrasound report in Ob Procedures for additional information regarding today's visit

## 2025-05-01 ENCOUNTER — ROUTINE PRENATAL (OUTPATIENT)
Age: 30
End: 2025-05-01
Attending: OBSTETRICS & GYNECOLOGY
Payer: MEDICARE

## 2025-05-01 VITALS
SYSTOLIC BLOOD PRESSURE: 98 MMHG | HEIGHT: 63 IN | HEART RATE: 92 BPM | WEIGHT: 207.8 LBS | DIASTOLIC BLOOD PRESSURE: 60 MMHG | BODY MASS INDEX: 36.82 KG/M2

## 2025-05-01 DIAGNOSIS — O09.293 HISTORY OF STILLBIRTH IN CURRENTLY PREGNANT PATIENT, THIRD TRIMESTER: Primary | ICD-10-CM

## 2025-05-01 DIAGNOSIS — Z3A.32 32 WEEKS GESTATION OF PREGNANCY: ICD-10-CM

## 2025-05-01 PROCEDURE — 59025 FETAL NON-STRESS TEST: CPT | Performed by: OBSTETRICS & GYNECOLOGY

## 2025-05-01 NOTE — PROGRESS NOTES
Repeat Non-Stress Testing:    Patient verbalizes +FM. Pt denies ALL:               Leaking of fluid   Contractions   Vaginal bleeding   Decreased fetal movement    Patient is performing daily kick counts. Patient has no questions or concerns.   NST strip reviewed by Dr. Le in-person.

## 2025-05-01 NOTE — LETTER
May 1, 2025     Terrance Stringer MD  [address] [place] [address]  [address]  [facility] [address]    Patient: Merline Madden   YOB: 1995   Date of Visit: 5/1/2025       Dear Dr. Terrance Stringer MD:    Thank you for referring Merline Madden to me for evaluation. Below are my notes for this consultation.    If you have questions, please do not hesitate to call me. I look forward to following your patient along with you.         Sincerely,        Katelin Le MD        CC: No Recipients    Katelin Le MD  5/1/2025  3:36 PM  Sign when Signing Visit  NST is reactive.  Katelin Le M.D.

## 2025-05-02 ENCOUNTER — TELEPHONE (OUTPATIENT)
Age: 30
End: 2025-05-02

## 2025-05-02 PROBLEM — O23.42 URINARY TRACT INFECTION IN MOTHER DURING SECOND TRIMESTER OF PREGNANCY: Status: RESOLVED | Noted: 2025-03-27 | Resolved: 2025-05-02

## 2025-05-02 NOTE — TELEPHONE ENCOUNTER
For ob navigator:  FYI- Patient called to cancel 32wk ob appt today at 3:00 (reason not provided).   Offered to r/s to Mon 5/5 Dr Castellanos 11:00/ 11:15 Lacho Stanley. Pt declined & advised they would call back later to r/s.   Encouraged pt to r/s to keep prenatal thelma; does not have any future ob appts scheduled.

## 2025-05-06 ENCOUNTER — ULTRASOUND (OUTPATIENT)
Age: 30
End: 2025-05-06
Payer: MEDICARE

## 2025-05-06 VITALS
SYSTOLIC BLOOD PRESSURE: 92 MMHG | HEIGHT: 63 IN | HEART RATE: 85 BPM | DIASTOLIC BLOOD PRESSURE: 52 MMHG | BODY MASS INDEX: 37.14 KG/M2 | WEIGHT: 209.6 LBS

## 2025-05-06 DIAGNOSIS — O36.4XX0 IUFD AT 20 WEEKS OR MORE OF GESTATION: Primary | ICD-10-CM

## 2025-05-06 DIAGNOSIS — Z3A.33 33 WEEKS GESTATION OF PREGNANCY: ICD-10-CM

## 2025-05-06 PROCEDURE — 76815 OB US LIMITED FETUS(S): CPT | Performed by: NURSE PRACTITIONER

## 2025-05-06 PROCEDURE — 59025 FETAL NON-STRESS TEST: CPT | Performed by: NURSE PRACTITIONER

## 2025-05-06 PROCEDURE — 99213 OFFICE O/P EST LOW 20 MIN: CPT | Performed by: NURSE PRACTITIONER

## 2025-05-06 NOTE — PROGRESS NOTES
Repeat Non-Stress Testing:    Patient verbalizes +FM. Pt denies ALL:               Leaking of fluid   Contractions   Vaginal bleeding   Decreased fetal movement    Patient is performing daily kick counts. Patient has no questions or concerns.   NST strip reviewed by ELIDA Becerra in-person.

## 2025-05-06 NOTE — PROGRESS NOTES
[facility]: Ms. Madden was seen today at 33w1d gestational age for NST (found under the pregnancy episode) which I reviewed the RN assessment and agree, and KAN (see ultrasound report under OB procedures tab).       Nedra MARRERO    I spent 10 minutes devoted to patient care (3 min chart preparation, 4 minutes face to face and  3 minutes documenting).

## 2025-05-07 ENCOUNTER — TELEPHONE (OUTPATIENT)
Dept: PERINATAL CARE | Facility: OTHER | Age: 30
End: 2025-05-07

## 2025-05-07 NOTE — TELEPHONE ENCOUNTER
Called and left voicemail via [employer] [phone] for patient informing her of an upcoming telemedicine vist on  May 8, 2025 at 2:30 PM at our [place] office. Requested she give our office a call back at [phone] with any questions or to reschedule.

## 2025-05-08 ENCOUNTER — ROUTINE PRENATAL (OUTPATIENT)
Age: 30
End: 2025-05-08
Attending: OBSTETRICS & GYNECOLOGY
Payer: MEDICARE

## 2025-05-08 VITALS
SYSTOLIC BLOOD PRESSURE: 96 MMHG | HEIGHT: 63 IN | DIASTOLIC BLOOD PRESSURE: 54 MMHG | BODY MASS INDEX: 37.03 KG/M2 | WEIGHT: 209 LBS | HEART RATE: 101 BPM

## 2025-05-08 DIAGNOSIS — Z3A.33 33 WEEKS GESTATION OF PREGNANCY: ICD-10-CM

## 2025-05-08 DIAGNOSIS — O09.293 HISTORY OF STILLBIRTH IN CURRENTLY PREGNANT PATIENT, THIRD TRIMESTER: Primary | ICD-10-CM

## 2025-05-08 PROBLEM — O99.810 ABNORMAL GLUCOSE TOLERANCE TEST IN PREGNANCY: Status: ACTIVE | Noted: 2025-05-08

## 2025-05-08 PROBLEM — O09.299 HISTORY OF SHOULDER DYSTOCIA IN PRIOR PREGNANCY, CURRENTLY PREGNANT: Status: ACTIVE | Noted: 2025-05-08

## 2025-05-08 PROCEDURE — 59025 FETAL NON-STRESS TEST: CPT | Performed by: OBSTETRICS & GYNECOLOGY

## 2025-05-08 NOTE — PROGRESS NOTES
Encounter completed using telehealth     Nonstress test at 33-5/7 weeks gestation.    Indication history of fetal demise.    Interpretation reactive.    Plan.    Twice a week nonstress test once a week KAN.    Rod Green MD, MSCE    Maternal-fetal medicine

## 2025-05-08 NOTE — PROGRESS NOTES
Repeat Non-Stress Testing:    Patient verbalizes +FM. Pt denies ALL:               Leaking of fluid   Contractions   Vaginal bleeding   Decreased fetal movement    Patient is performing daily kick counts. Patient has no questions or concerns.   NST strip reviewed by Dr. Green via teams virtual rounding.

## 2025-05-09 ENCOUNTER — ROUTINE PRENATAL (OUTPATIENT)
Dept: OBGYN CLINIC | Facility: CLINIC | Age: 30
End: 2025-05-09
Payer: MEDICARE

## 2025-05-09 VITALS
WEIGHT: 210 LBS | SYSTOLIC BLOOD PRESSURE: 110 MMHG | DIASTOLIC BLOOD PRESSURE: 72 MMHG | HEIGHT: 63 IN | OXYGEN SATURATION: 99 % | BODY MASS INDEX: 37.21 KG/M2 | HEART RATE: 72 BPM

## 2025-05-09 DIAGNOSIS — N89.8 VAGINAL ODOR: ICD-10-CM

## 2025-05-09 DIAGNOSIS — B00.9 HERPES SIMPLEX VIRUS TYPE 2 (HSV-2) INFECTION AFFECTING PREGNANCY IN THIRD TRIMESTER: ICD-10-CM

## 2025-05-09 DIAGNOSIS — O98.513 HERPES SIMPLEX VIRUS TYPE 2 (HSV-2) INFECTION AFFECTING PREGNANCY IN THIRD TRIMESTER: ICD-10-CM

## 2025-05-09 DIAGNOSIS — O09.93 ENCOUNTER FOR SUPERVISION OF HIGH RISK PREGNANCY IN THIRD TRIMESTER, ANTEPARTUM: Primary | ICD-10-CM

## 2025-05-09 PROCEDURE — 99214 OFFICE O/P EST MOD 30 MIN: CPT | Performed by: NURSE PRACTITIONER

## 2025-05-09 PROCEDURE — 87510 GARDNER VAG DNA DIR PROBE: CPT | Performed by: NURSE PRACTITIONER

## 2025-05-09 PROCEDURE — 87660 TRICHOMONAS VAGIN DIR PROBE: CPT | Performed by: NURSE PRACTITIONER

## 2025-05-09 PROCEDURE — 87480 CANDIDA DNA DIR PROBE: CPT | Performed by: NURSE PRACTITIONER

## 2025-05-09 NOTE — PROGRESS NOTES
30-year-old -0-0-1 at 33 weeks 4 days gestation.  Obesity, history genital herpes, history of IUFD at 40 weeks,    Low-dose ASA- taking  Tdap declined, consent completed  Twice weekly NST with KAN starting at 32 weeks-- scheduled.  Has growth scan 25  Patient elects for , scheduled 6/10/2025    Baby is active, denies leakage of fluid, vaginal bleeding or uterine contractions.  S=D, normal FHTs, normal BP    Reporting malodorous vaginal discharge.  Culture collected.  Reminded to start valacyclovir in 2w.  RV 2w

## 2025-05-10 LAB
CANDIDA RRNA VAG QL PROBE: NOT DETECTED
G VAGINALIS RRNA GENITAL QL PROBE: DETECTED
T VAGINALIS RRNA GENITAL QL PROBE: NOT DETECTED

## 2025-05-12 ENCOUNTER — RESULTS FOLLOW-UP (OUTPATIENT)
Dept: OBGYN CLINIC | Facility: CLINIC | Age: 30
End: 2025-05-12

## 2025-05-12 DIAGNOSIS — O23.599 BACTERIAL VAGINOSIS IN PREGNANCY: Primary | ICD-10-CM

## 2025-05-12 DIAGNOSIS — B96.89 BACTERIAL VAGINOSIS IN PREGNANCY: Primary | ICD-10-CM

## 2025-05-12 RX ORDER — METRONIDAZOLE 500 MG/1
500 TABLET ORAL EVERY 12 HOURS SCHEDULED
Qty: 14 TABLET | Refills: 0 | Status: SHIPPED | OUTPATIENT
Start: 2025-05-12 | End: 2025-05-15

## 2025-05-13 ENCOUNTER — HOSPITAL ENCOUNTER (OUTPATIENT)
Facility: HOSPITAL | Age: 30
Discharge: HOME | End: 2025-05-13
Attending: OBSTETRICS & GYNECOLOGY | Admitting: OBSTETRICS & GYNECOLOGY
Payer: MEDICARE

## 2025-05-13 ENCOUNTER — ULTRASOUND (OUTPATIENT)
Age: 30
End: 2025-05-13
Attending: OBSTETRICS & GYNECOLOGY
Payer: MEDICARE

## 2025-05-13 VITALS
HEART RATE: 92 BPM | BODY MASS INDEX: 38.64 KG/M2 | DIASTOLIC BLOOD PRESSURE: 61 MMHG | SYSTOLIC BLOOD PRESSURE: 98 MMHG | HEIGHT: 62 IN | RESPIRATION RATE: 18 BRPM | WEIGHT: 210 LBS | TEMPERATURE: 98 F | OXYGEN SATURATION: 99 %

## 2025-05-13 VITALS
SYSTOLIC BLOOD PRESSURE: 100 MMHG | WEIGHT: 211 LBS | BODY MASS INDEX: 37.39 KG/M2 | HEART RATE: 77 BPM | DIASTOLIC BLOOD PRESSURE: 54 MMHG | HEIGHT: 63 IN

## 2025-05-13 DIAGNOSIS — O99.519 ASTHMA AFFECTING PREGNANCY, ANTEPARTUM: Primary | ICD-10-CM

## 2025-05-13 DIAGNOSIS — O09.293 HISTORY OF STILLBIRTH IN CURRENTLY PREGNANT PATIENT, THIRD TRIMESTER: Primary | ICD-10-CM

## 2025-05-13 DIAGNOSIS — Z3A.34 34 WEEKS GESTATION OF PREGNANCY: ICD-10-CM

## 2025-05-13 DIAGNOSIS — E66.812 OBESITY, CLASS II, BMI 35-39.9: ICD-10-CM

## 2025-05-13 DIAGNOSIS — J45.909 ASTHMA AFFECTING PREGNANCY, ANTEPARTUM: Primary | ICD-10-CM

## 2025-05-13 PROBLEM — N93.9 VAGINAL SPOTTING: Status: ACTIVE | Noted: 2025-05-13

## 2025-05-13 PROCEDURE — 99202 OFFICE O/P NEW SF 15 MIN: CPT

## 2025-05-13 PROCEDURE — 76815 OB US LIMITED FETUS(S): CPT | Performed by: OBSTETRICS & GYNECOLOGY

## 2025-05-13 PROCEDURE — NC001 PR NO CHARGE: Performed by: OBSTETRICS & GYNECOLOGY

## 2025-05-13 PROCEDURE — 59025 FETAL NON-STRESS TEST: CPT | Performed by: OBSTETRICS & GYNECOLOGY

## 2025-05-13 NOTE — PROGRESS NOTES
Repeat Non-Stress Testing:    Patient verbalizes +FM. Pt denies ALL:               Leaking of fluid   Contractions   Vaginal bleeding   Decreased fetal movement    Patient is performing daily kick counts. Patient states that she has been having pain low in her abdomen/vaginal area.  NST strip reviewed by Dr. Green in-person.

## 2025-05-13 NOTE — PROGRESS NOTES
A fetal ultrasound and NST were completed. See Ob procedures in Epic for an interpretation and recommendations. Do not hesitate to contact us in [facility] if you have questions.    Rod Green MD, MSCE  Maternal Fetal Medicine

## 2025-05-13 NOTE — LETTER
May 13, 2025     Dee Dey MD  [address] [address]    Patient: Merline Madden   YOB: 1995   Date of Visit: 5/13/2025       Dear Dr. Dee Dey MD:    Thank you for referring Merline Madden to me for evaluation. Below are my notes for this consultation.    If you have questions, please do not hesitate to call me. I look forward to following your patient along with you.         Sincerely,        Rod Green MD        CC: No Recipients    Rod Green MD  5/13/2025 11:42 AM  Sign when Signing Visit  A fetal ultrasound and NST were completed. See Ob procedures in Epic for an interpretation and recommendations. Do not hesitate to contact us in [facility] if you have questions.    Rod Green MD, MSCE  Maternal Fetal Medicine

## 2025-05-13 NOTE — PROGRESS NOTES
L&D Triage Note - OB/GYN  Merline Madden 30 y.o. female MRN: [id number]  Unit/Bed#:  TRIAGE  Encounter: [id number]      ASSESSMENT:    Merline Madden is a 30 y.o.  at 34w1d who was evaluated today in triage due to vaginal bleeding. No vaginal bleeding was noted on speculum exam. SVE closed/thick/high. Reassuring work up, the patient does not appear to be in  labor and it is safe to discharge home.     PLAN:    1) Cramping  - Speculum exam: no abnormal discharge, no pooling, no bleeding  - Microscopy wnl  - SVE: closed/thick/high    2) 34 weeks gestation of pregnancy  - Continue routine prenatal care  - Discharge from OB triage with  labor precautions    - Reviewed rupture of membranes, false vs true labor, decreased fetal movement, and vaginal bleeding   - Pt to call provider with any concerns and follow up at her next scheduled prenatal appointment    - Case discussed with Dr. Leone    SUBJECTIVE:    Merline Madden 30 y.o.  at 34w1d with an Estimated Date of Delivery: 25 was sent over from [facility] for episode of vaginal bleeding and pelvic pain. She reports having an episode of vaginal spotting 2 days ago, which has since resolved. This right after intercourse. She also reports having pelvic pressure and has had contractions. She denies leakage of fluid. She also endorses good fetal movement.     [employer] (#[id number]) Serbian interpretation was used during this encounter.    Her past obstetrical history is significant for history of intrauterine fetal demise complicated by 10 minute shoulder dystocia. This pregnancy has been notable for HSV-2, ASCUS w/ positive HPV, positive Hepatitis C antibody in the blood, history of bariatric surgery, and fetal pyelectasis.      OBJECTIVE:    Vitals:    25 1322   BP: 98/61   Pulse: 92   Resp: 18   Temp: 98 °F (36.7 °C)   SpO2: 99%       ROS:  Constitutional: Negative  Respiratory:  Negative  Cardiovascular: Negative    Gastrointestinal: Negative    General Physical Exam:  General: Well appearing, no distress  Respiratory: Unlabored breathing  Cardiovascular: Regular rate.  Abdomen: Soft, gravid, nontender  Fundal Height: Appropriate for gestational age.  Extremities: Warm and well perfused.  Non tender.      Fetal monitoring:  Fetal heart rate: Baseline Rate (FHR): 135 bpm  Variability: Moderate  Accelerations: 15 x 15 or greater  Decelerations: None  [place]: Contraction Frequency (minutes): 0  Contraction Duration (seconds): 0      Cervical Exam  Speculum: Cervical os is closed. No abnormal discharge, no bleeding, no lesions, no pooling      SVE: closed/thick/high        Ulises Wei MD  5/13/2025  2:39 PM

## 2025-05-15 ENCOUNTER — ROUTINE PRENATAL (OUTPATIENT)
Age: 30
End: 2025-05-15
Attending: OBSTETRICS & GYNECOLOGY
Payer: MEDICARE

## 2025-05-15 ENCOUNTER — APPOINTMENT (OUTPATIENT)
Dept: ULTRASOUND IMAGING | Facility: HOSPITAL | Age: 30
End: 2025-05-15
Payer: MEDICARE

## 2025-05-15 ENCOUNTER — HOSPITAL ENCOUNTER (OUTPATIENT)
Facility: HOSPITAL | Age: 30
Setting detail: OBSERVATION
Discharge: HOME | End: 2025-05-16
Attending: OBSTETRICS & GYNECOLOGY | Admitting: OBSTETRICS & GYNECOLOGY
Payer: MEDICARE

## 2025-05-15 VITALS
WEIGHT: 212.6 LBS | BODY MASS INDEX: 37.67 KG/M2 | DIASTOLIC BLOOD PRESSURE: 52 MMHG | HEIGHT: 63 IN | SYSTOLIC BLOOD PRESSURE: 98 MMHG | HEART RATE: 113 BPM

## 2025-05-15 DIAGNOSIS — Z3A.34 34 WEEKS GESTATION OF PREGNANCY: Primary | ICD-10-CM

## 2025-05-15 DIAGNOSIS — O23.43 UTI (URINARY TRACT INFECTION) DURING PREGNANCY, THIRD TRIMESTER: Primary | ICD-10-CM

## 2025-05-15 DIAGNOSIS — O23.599 BACTERIAL VAGINOSIS IN PREGNANCY: Primary | ICD-10-CM

## 2025-05-15 DIAGNOSIS — O09.293: ICD-10-CM

## 2025-05-15 DIAGNOSIS — B96.89 BACTERIAL VAGINOSIS IN PREGNANCY: Primary | ICD-10-CM

## 2025-05-15 PROBLEM — M54.9 BACK PAIN: Status: ACTIVE | Noted: 2025-05-15

## 2025-05-15 LAB
ABO GROUP BLD: NORMAL
ALBUMIN SERPL BCG-MCNC: 3.3 G/DL (ref 3.5–5)
ALP SERPL-CCNC: 87 U/L (ref 34–104)
ALT SERPL W P-5'-P-CCNC: 11 U/L (ref 7–52)
ANION GAP SERPL CALCULATED.3IONS-SCNC: 6 MMOL/L (ref 4–13)
AST SERPL W P-5'-P-CCNC: 11 U/L (ref 13–39)
BACTERIA UR QL AUTO: ABNORMAL /HPF
BILIRUB SERPL-MCNC: 0.26 MG/DL (ref 0.2–1)
BILIRUB UR QL STRIP: NEGATIVE
BLD GP AB SCN SERPL QL: NEGATIVE
BUN SERPL-MCNC: 9 MG/DL (ref 5–25)
CALCIUM ALBUM COR SERPL-MCNC: 9.2 MG/DL (ref 8.3–10.1)
CALCIUM SERPL-MCNC: 8.6 MG/DL (ref 8.4–10.2)
CHLORIDE SERPL-SCNC: 106 MMOL/L (ref 96–108)
CLARITY UR: ABNORMAL
CO2 SERPL-SCNC: 23 MMOL/L (ref 21–32)
COLOR UR: ABNORMAL
CREAT SERPL-MCNC: 0.6 MG/DL (ref 0.6–1.3)
ERYTHROCYTE [DISTWIDTH] IN BLOOD BY AUTOMATED COUNT: 13.4 % (ref 11.6–15.1)
GFR SERPL CREATININE-BSD FRML MDRD: 121 ML/MIN/1.73SQ M
GLUCOSE SERPL-MCNC: 78 MG/DL (ref 65–140)
GLUCOSE UR STRIP-MCNC: NEGATIVE MG/DL
HCT VFR BLD AUTO: 34.2 % (ref 34.8–46.1)
HGB BLD-MCNC: 11.2 G/DL (ref 11.5–15.4)
HGB UR QL STRIP.AUTO: ABNORMAL
HYALINE CASTS #/AREA URNS LPF: ABNORMAL /LPF
KETONES UR STRIP-MCNC: NEGATIVE MG/DL
LEUKOCYTE ESTERASE UR QL STRIP: ABNORMAL
MCH RBC QN AUTO: 25.3 PG (ref 26.8–34.3)
MCHC RBC AUTO-ENTMCNC: 32.7 G/DL (ref 31.4–37.4)
MCV RBC AUTO: 77 FL (ref 82–98)
MUCOUS THREADS UR QL AUTO: ABNORMAL
NITRITE UR QL STRIP: NEGATIVE
NON-SQ EPI CELLS URNS QL MICRO: ABNORMAL /HPF
PH UR STRIP.AUTO: 8 [PH]
PLATELET # BLD AUTO: 295 THOUSANDS/UL (ref 149–390)
PMV BLD AUTO: 10.3 FL (ref 8.9–12.7)
POTASSIUM SERPL-SCNC: 3.9 MMOL/L (ref 3.5–5.3)
PROT SERPL-MCNC: 6.7 G/DL (ref 6.4–8.4)
PROT UR STRIP-MCNC: ABNORMAL MG/DL
RBC # BLD AUTO: 4.42 MILLION/UL (ref 3.81–5.12)
RBC #/AREA URNS AUTO: ABNORMAL /HPF
RH BLD: POSITIVE
SODIUM SERPL-SCNC: 135 MMOL/L (ref 135–147)
SP GR UR STRIP.AUTO: 1.02 (ref 1–1.03)
SPECIMEN EXPIRATION DATE: NORMAL
UROBILINOGEN UR STRIP-ACNC: <2 MG/DL
WBC # BLD AUTO: 9.25 THOUSAND/UL (ref 4.31–10.16)
WBC #/AREA URNS AUTO: ABNORMAL /HPF

## 2025-05-15 PROCEDURE — 59025 FETAL NON-STRESS TEST: CPT

## 2025-05-15 PROCEDURE — NC001 PR NO CHARGE: Performed by: OBSTETRICS & GYNECOLOGY

## 2025-05-15 PROCEDURE — G0379 DIRECT REFER HOSPITAL OBSERV: HCPCS

## 2025-05-15 PROCEDURE — 86900 BLOOD TYPING SEROLOGIC ABO: CPT

## 2025-05-15 PROCEDURE — 76775 US EXAM ABDO BACK WALL LIM: CPT

## 2025-05-15 PROCEDURE — 85027 COMPLETE CBC AUTOMATED: CPT

## 2025-05-15 PROCEDURE — 86850 RBC ANTIBODY SCREEN: CPT

## 2025-05-15 PROCEDURE — 80053 COMPREHEN METABOLIC PANEL: CPT

## 2025-05-15 PROCEDURE — 81001 URINALYSIS AUTO W/SCOPE: CPT

## 2025-05-15 PROCEDURE — 87086 URINE CULTURE/COLONY COUNT: CPT

## 2025-05-15 PROCEDURE — 99222 1ST HOSP IP/OBS MODERATE 55: CPT | Performed by: OBSTETRICS & GYNECOLOGY

## 2025-05-15 PROCEDURE — 87186 SC STD MICRODIL/AGAR DIL: CPT

## 2025-05-15 PROCEDURE — 87077 CULTURE AEROBIC IDENTIFY: CPT

## 2025-05-15 PROCEDURE — 86901 BLOOD TYPING SEROLOGIC RH(D): CPT

## 2025-05-15 RX ORDER — CYCLOBENZAPRINE HCL 10 MG
10 TABLET ORAL 3 TIMES DAILY PRN
Status: DISCONTINUED | OUTPATIENT
Start: 2025-05-15 | End: 2025-05-16 | Stop reason: HOSPADM

## 2025-05-15 RX ORDER — METRONIDAZOLE 7.5 MG/G
1 GEL VAGINAL DAILY
Qty: 5 G | Refills: 0 | Status: SHIPPED | OUTPATIENT
Start: 2025-05-15 | End: 2025-05-20

## 2025-05-15 RX ORDER — ASPIRIN 81 MG/1
162 TABLET, CHEWABLE ORAL DAILY
Status: DISCONTINUED | OUTPATIENT
Start: 2025-05-16 | End: 2025-05-16 | Stop reason: HOSPADM

## 2025-05-15 RX ORDER — METRONIDAZOLE 7.5 MG/G
1 GEL VAGINAL DAILY
Status: DISCONTINUED | OUTPATIENT
Start: 2025-05-16 | End: 2025-05-16 | Stop reason: HOSPADM

## 2025-05-15 RX ORDER — VALACYCLOVIR HYDROCHLORIDE 500 MG/1
1000 TABLET, FILM COATED ORAL 2 TIMES DAILY
Status: DISCONTINUED | OUTPATIENT
Start: 2025-05-15 | End: 2025-05-16 | Stop reason: HOSPADM

## 2025-05-15 RX ORDER — SENNOSIDES 8.6 MG
1 TABLET ORAL ONCE
Status: COMPLETED | OUTPATIENT
Start: 2025-05-15 | End: 2025-05-15

## 2025-05-15 RX ORDER — ACETAMINOPHEN 10 MG/ML
1000 INJECTION, SOLUTION INTRAVENOUS ONCE
Status: COMPLETED | OUTPATIENT
Start: 2025-05-15 | End: 2025-05-15

## 2025-05-15 RX ORDER — LIDOCAINE 50 MG/G
1 PATCH TOPICAL DAILY
Status: DISCONTINUED | OUTPATIENT
Start: 2025-05-15 | End: 2025-05-16 | Stop reason: HOSPADM

## 2025-05-15 RX ORDER — CALCIUM CARBONATE 500 MG/1
1000 TABLET, CHEWABLE ORAL DAILY PRN
Status: DISCONTINUED | OUTPATIENT
Start: 2025-05-15 | End: 2025-05-16 | Stop reason: HOSPADM

## 2025-05-15 RX ORDER — ONDANSETRON 2 MG/ML
4 INJECTION INTRAMUSCULAR; INTRAVENOUS EVERY 8 HOURS PRN
Status: DISCONTINUED | OUTPATIENT
Start: 2025-05-15 | End: 2025-05-16 | Stop reason: HOSPADM

## 2025-05-15 RX ORDER — POLYETHYLENE GLYCOL 3350 17 G/17G
17 POWDER, FOR SOLUTION ORAL DAILY
Status: DISCONTINUED | OUTPATIENT
Start: 2025-05-16 | End: 2025-05-16 | Stop reason: HOSPADM

## 2025-05-15 RX ORDER — ALBUTEROL SULFATE 90 UG/1
2 INHALANT RESPIRATORY (INHALATION) EVERY 4 HOURS PRN
Qty: 8.5 G | Refills: 0 | Status: SHIPPED | OUTPATIENT
Start: 2025-05-15

## 2025-05-15 RX ORDER — ALBUTEROL SULFATE 90 UG/1
2 INHALANT RESPIRATORY (INHALATION) EVERY 4 HOURS PRN
Status: DISCONTINUED | OUTPATIENT
Start: 2025-05-15 | End: 2025-05-16 | Stop reason: HOSPADM

## 2025-05-15 RX ADMIN — SENNOSIDES 8.6 MG: 8.6 TABLET, FILM COATED ORAL at 17:37

## 2025-05-15 RX ADMIN — ACETAMINOPHEN 1000 MG: 10 INJECTION INTRAVENOUS at 16:39

## 2025-05-15 RX ADMIN — CYCLOBENZAPRINE 10 MG: 10 TABLET, FILM COATED ORAL at 18:42

## 2025-05-15 RX ADMIN — VALACYCLOVIR HYDROCHLORIDE 1000 MG: 500 TABLET, FILM COATED ORAL at 20:52

## 2025-05-15 RX ADMIN — SODIUM CHLORIDE, SODIUM LACTATE, POTASSIUM CHLORIDE, AND CALCIUM CHLORIDE 1000 ML: .6; .31; .03; .02 INJECTION, SOLUTION INTRAVENOUS at 16:40

## 2025-05-15 RX ADMIN — MORPHINE SULFATE 2 MG: 2 INJECTION, SOLUTION INTRAMUSCULAR; INTRAVENOUS at 20:45

## 2025-05-15 RX ADMIN — LIDOCAINE 1 PATCH: 50 PATCH CUTANEOUS at 18:42

## 2025-05-15 NOTE — PROGRESS NOTES
[facility]: Ms. Madden was seen today at 34w3d gestational age for NST (found under the pregnancy episode) which I reviewed the RN assessment and agree. NST shows reactivity without decelerations.   Valeria MARRERO

## 2025-05-15 NOTE — PROGRESS NOTES
Repeat Non-Stress Testing:    Patient verbalizes +FM. Pt denies ALL:               Leaking of fluid   Contractions   Vaginal bleeding   Decreased fetal movement    Patient is performing daily kick counts. Patient has no questions or concerns.   NST strip reviewed by ELIDA Singh in-person.

## 2025-05-15 NOTE — PROGRESS NOTES
Progress Note - OB/GYN   Name: Merline Madden 30 y.o. female I MRN: [id number]  Unit/Bed#: LD TRIAGE  I Date of Admission: 5/15/2025   Date of Service: 5/15/2025 I Hospital Day: 0       L&D Triage Note - OB/GYN  Merline Madden 30 y.o. female MRN: [id number]  Unit/Bed#: LD TRIAGE  Encounter: [id number]      Assessment:  30 y.o.  at 34w3d presenting with left flank pain concerning for a renal stone.     Plan:  1. Flank pain   SVE: closed/thick/high   F/u UA, CBC, CMP   F/u kidney US.    Disposition pending night team.    D/W Dr. Cuadra  ______________________________________________________________________      Chief Complaint: flank pain    Subjective:  30 y.o.  at 34w3d presenting with left sided flank pain that started 2 hours prior to presentation. She states the pain is severe. Denies nausea and vomiting.     Her pregnancy is complicated by hx of prior demise. She denies decreased fetal movement, contractions, leakage of fluid and vaginal bleeding.     ROS:   Review of Systems   Constitutional:  Negative for chills and fever.   HENT:  Negative for ear pain and sore throat.    Eyes:  Negative for pain and visual disturbance.   Respiratory:  Negative for cough and shortness of breath.    Cardiovascular:  Negative for chest pain and palpitations.   Gastrointestinal:  Negative for abdominal pain and vomiting.   Genitourinary:  Negative for dysuria and hematuria.   Musculoskeletal:  Negative for arthralgias and back pain.   Skin:  Negative for color change and rash.   Neurological:  Negative for seizures and syncope.   All other systems reviewed and are negative.      Objective:  Vitals:    05/15/25 1605   BP: 117/68   Pulse: 100       Physical Exam  Constitutional:       Appearance: Normal appearance.   Genitourinary:      Vulva normal.     Cardiovascular:      Rate and Rhythm: Normal rate.      Pulses: Normal pulses.   Pulmonary:      Effort: Pulmonary effort is normal.  No respiratory distress.   Abdominal:      Palpations: Abdomen is soft.      Tenderness: There is no abdominal tenderness. There is left CVA tenderness. There is no right CVA tenderness.      Comments: gravid     Neurological:      Mental Status: She is alert.     Skin:     General: Skin is warm and dry.     Psychiatric:         Mood and Affect: Mood normal.         Behavior: Behavior normal.   Vitals reviewed.        SVE: 0 / 0% / -3  FHT:  reactive  [place]: no contractions    Prudence Hinson MD  OBGYN PGY-4  05/15/25 5:09 PM

## 2025-05-15 NOTE — H&P
H & P- Obstetrics   Merline Madden 30 y.o. female MRN: [id number]  Unit/Bed#:  311-01 Encounter: [id number]    Assessment: 30 y.o.  at 34w3d admitted for observation and pain management/antibiotics in setting of suspected nephrolithiasis vs pyelonephritis.    Plan:   34 weeks gestation of pregnancy  Assessment & Plan  - NST TID    Abnormal glucose tolerance test in pregnancy  Assessment & Plan  - Elevated 1hr gtt (160), 3hr not yet complete  - POC glucose     * Back pain  Assessment & Plan  - L flank/back pain, hx of UTI in March  - U/A with occult blood,+WBC, neg nitrites  - Renal U/S w/left-sided hydronephrosis, with dilated partially visualized proximal ureter.   - Afebrile, denies urinary symptoms   - Suspect nephrolithiasis: continue IVF hydration, strain urine, treat pain PRN  - Hx of UTI, low suspicion for pyelonephritis as afebrile and denies urinary sx, but will empirically treat with Rocephin pending Ucx     Plan:   - Diet: regular   - Pain: tylenol/flexeril/morphine  - IVF:  cc/hr  - Disposition: pending pain management and clinical status            Discussed case and plan w/ Dr. Cuadra.    Chief Complaint: back and flank pain    HPI: Merline Madden is a 30 y.o.  with an FAHEEM of 2025, by Ultrasound at 34w3d who is being admitted for back/flank pain with suspected nephrolithiasis. She reports 9/10 back/left flank pain that wraps around to her LLQ. She denies having uterine contractions, has no LOF, and reports no VB. She states she has felt good FM. She denies fevers, chills, nausea, vomiting. She has never had a kidney stone before. Denies urinary symptoms.    Problem List[1]    Baby complications/comments: none    Review of Systems   All other systems reviewed and are negative.      OB Hx:  OB History    Para Term  AB Living   2 1 1      SAB IAB Ectopic Multiple Live Births             # Outcome Date GA Lbr Guille/2nd Weight Sex Type Anes PTL  Lv   2 Current            1 Term 08/04/24 40w1d  2551 g (5 lb 10 oz)  Vag-Spont EPI  FD      Complications: Shoulder Dystocia, Dead fetus in utero, Prolapse of umbilical cord     Past Medical Hx:  Past Medical History:   Diagnosis Date    Herpes 2022    Varicella      Past Surgical hx:  Past Surgical History:   Procedure Laterality Date    BARIATRIC SURGERY  08/2023       Social Hx:  Alcohol use: denies  Tobacco use: denies  Other substance use: denies    Allergies[2]      Medications Prior to Admission:     acetaminophen (TYLENOL) 160 mg/5 mL liquid    aspirin 81 mg chewable tablet    polyethylene glycol (MIRALAX) 17 g packet    Prenatal MV & Min w/FA-DHA (Prenatal Adult Gummy/DHA/FA) 0.4-25 MG CHEW    albuterol (PROVENTIL HFA,VENTOLIN HFA) 90 mcg/act inhaler    Blood Glucose Monitoring Suppl (OneTouch Verio) w/Device KIT    glucose blood (OneTouch Verio) test strip    metroNIDAZOLE (METROGEL) 0.75 % vaginal gel    valACYclovir (VALTREX) 1,000 mg tablet    Objective:  Temp:  [98 °F (36.7 °C)-98.2 °F (36.8 °C)] 98.2 °F (36.8 °C)  HR:  [100-113] 102  BP: ()/(52-68) 105/59  Resp:  [16-18] 16  There is no height or weight on file to calculate BMI.     Physical Exam:  Physical Exam  Constitutional:       Comments: Markedly uncomfortable in bed, difficulty laying on right side   Genitourinary:      Genitourinary Comments: Deferred   Pulmonary:      Effort: Pulmonary effort is normal.   Abdominal:      Comments: Gravid, non-tender     Musculoskeletal:      Comments: +L CVA tenderness     Neurological:      Mental Status: She is alert.     Skin:     General: Skin is warm and dry.   Vitals reviewed.        FHT:  Baseline Rate (FHR): 125 bpm  Variability: Moderate  Accelerations: 15 x 15 or greater  Decelerations: None    TOCO:   Contraction Frequency (minutes): n/a  Contraction Duration (seconds): n/a  Contraction Intensity:  (n/a)    Lab Results   Component Value Date    WBC 9.25 05/15/2025    HGB 11.2 (L) 05/15/2025     HCT 34.2 (L) 05/15/2025     05/15/2025     Lab Results   Component Value Date     06/14/2018    K 3.9 05/15/2025     05/15/2025    CO2 23 05/15/2025    BUN 9 05/15/2025    CREATININE 0.60 05/15/2025    GLUCOSE 97 06/14/2018    AST 11 (L) 05/15/2025    ALT 11 05/15/2025     Prenatal Labs: Reviewed      Blood type: B positive  Antibody: negative  GBS: unknown  HIV: non-reactive  Rubella: non-immune  Syphilis IgM/IgG: non-reactive   HBsAg: non-reactive  HCAb: reactive; HCV RNA not detected  Chlamydia: negative  Gonorrhea: negative  Diabetes 1 hour screen: 160 mg/dL  3 hour glucose: not yet completed  Platelets: 295 thousands/uL  Hgb: 11.2 g/dL  <2 Midnights  OBSERVATION    Signature/Title: Casandra Lane MD  Date: 5/16/2025  Time: 4:14 AM         [1]   Patient Active Problem List  Diagnosis    Herpes simplex vulvovaginitis    ASCUS with positive high risk HPV cervical    Previous bariatric surgery complicating pregnancy    HSV-2 seropositive    Hepatitis C antibody positive in blood    Rubella non-immune status, antepartum    Bacterial vaginosis in pregnancy    Anxiety    Pyelectasis of fetus on prenatal ultrasound    Obesity, Class I, BMI 30-34.9    Abnormal glucose tolerance test in pregnancy    History of shoulder dystocia in prior pregnancy, currently pregnant    Herpes simplex virus type 2 (HSV-2) infection affecting pregnancy in third trimester    Encounter for supervision of high risk pregnancy in third trimester, antepartum    Vaginal spotting    IUGR (intrauterine growth retardation) & stillbirth history, pregnant, third trimester    34 weeks gestation of pregnancy    Back pain   [2] No Known Allergies

## 2025-05-16 VITALS
HEART RATE: 96 BPM | TEMPERATURE: 97.6 F | SYSTOLIC BLOOD PRESSURE: 96 MMHG | DIASTOLIC BLOOD PRESSURE: 50 MMHG | RESPIRATION RATE: 16 BRPM

## 2025-05-16 LAB — GLUCOSE SERPL-MCNC: 111 MG/DL (ref 65–140)

## 2025-05-16 PROCEDURE — 82948 REAGENT STRIP/BLOOD GLUCOSE: CPT

## 2025-05-16 PROCEDURE — 99215 OFFICE O/P EST HI 40 MIN: CPT

## 2025-05-16 RX ORDER — NITROFURANTOIN 25; 75 MG/1; MG/1
100 CAPSULE ORAL 2 TIMES DAILY
Qty: 10 CAPSULE | Refills: 0 | Status: SHIPPED | OUTPATIENT
Start: 2025-05-16

## 2025-05-16 RX ADMIN — METRONIDAZOLE 1 APPLICATION: 7.5 GEL VAGINAL at 08:38

## 2025-05-16 RX ADMIN — PRENATAL VIT W/ FE FUMARATE-FA TAB 27-0.8 MG 1 TABLET: 27-0.8 TAB at 08:42

## 2025-05-16 RX ADMIN — POLYETHYLENE GLYCOL 3350 17 G: 17 POWDER, FOR SOLUTION ORAL at 08:42

## 2025-05-16 RX ADMIN — ASPIRIN 81 MG CHEWABLE TABLET 162 MG: 81 TABLET CHEWABLE at 08:42

## 2025-05-16 RX ADMIN — VALACYCLOVIR HYDROCHLORIDE 1000 MG: 500 TABLET, FILM COATED ORAL at 08:42

## 2025-05-16 RX ADMIN — DEXTROSE 1000 MG: 50 INJECTION, SOLUTION INTRAVENOUS at 02:55

## 2025-05-16 RX ADMIN — LIDOCAINE 1 PATCH: 50 PATCH CUTANEOUS at 08:42

## 2025-05-16 NOTE — ASSESSMENT & PLAN NOTE
- L flank/back pain, hx of UTI in March  - U/A with occult blood,+WBC, neg nitrites  - Renal U/S w/left-sided hydronephrosis, with dilated partially visualized proximal ureter.   - Afebrile, denies urinary symptoms   - Suspect nephrolithiasis: continue IVF hydration, strain urine, treat pain PRN  - Hx of UTI, low suspicion for pyelonephritis as afebrile and denies urinary sx, but will empirically treat with Rocephin pending Ucx     Plan:   - Diet: regular   - Pain: tylenol/flexeril/morphine  - IVF:  cc/hr  - consider discontinuation of rocephin and transition to macrobid pending urine culture  - Disposition: home

## 2025-05-16 NOTE — PROGRESS NOTES
Progress Note - OB/GYN   Name: Merline Madden 30 y.o. female I MRN: [id number]  Unit/Bed#: -01 I Date of Admission: 5/15/2025   Date of Service: 5/16/2025 I Hospital Day: 1     Assessment & Plan  Back pain  - L flank/back pain, hx of UTI in March  - U/A with occult blood,+WBC, neg nitrites  - Renal U/S w/left-sided hydronephrosis, with dilated partially visualized proximal ureter.   - Afebrile, denies urinary symptoms   - Suspect nephrolithiasis: continue IVF hydration, strain urine, treat pain PRN  - Hx of UTI, low suspicion for pyelonephritis as afebrile and denies urinary sx, but will empirically treat with Rocephin pending Ucx     Plan:   - Diet: regular   - Pain: tylenol/flexeril/morphine  - IVF:  cc/hr  - consider discontinuation of rocephin and transition to macrobid pending urine culture  - Disposition: home   Abnormal glucose tolerance test in pregnancy  - Elevated 1hr gtt (160), 3hr not yet complete  - POC glucose   34 weeks gestation of pregnancy  - NST TID    OB L&D Progress Note  Subjective   Merline states she feels much better this morning and has no pain. She had a bowel movement overnight. She denies OB complaints.     Objective :  Temp:  [98 °F (36.7 °C)-98.2 °F (36.8 °C)] 98.2 °F (36.8 °C)  HR:  [100-113] 102  BP: ()/(52-68) 105/59  Resp:  [16-18] 16    Fetal data:  Nonstress test: date 05/15/25 Time: 2044 - 2124  Baseline: 130 bpm  Variability: moderate  Accelerations: present, 15x15  Decelerations: absent  Contractions: absent  Assessment: reactive  Plan: continue TID and PRN    Physical Exam  Vitals and nursing note reviewed.   Constitutional:       General: She is not in acute distress.     Appearance: She is well-developed.   HENT:      Head: Normocephalic and atraumatic.     Eyes:      Conjunctiva/sclera: Conjunctivae normal.       Cardiovascular:      Rate and Rhythm: Normal rate.      Pulses: Normal pulses.   Pulmonary:      Effort: Pulmonary effort is normal.  No respiratory distress.   Abdominal:      Palpations: Abdomen is soft.      Tenderness: There is no abdominal tenderness.      Comments: gravid     Musculoskeletal:         General: No swelling.      Cervical back: Neck supple.     Skin:     General: Skin is warm and dry.      Capillary Refill: Capillary refill takes less than 2 seconds.     Neurological:      Mental Status: She is alert.     Psychiatric:         Mood and Affect: Mood normal.         Behavior: Behavior normal.         Lab Results: I have reviewed the following results:CBC/BMP:   .     05/15/25  1633   WBC 9.25   HGB 11.2*   HCT 34.2*      SODIUM 135   K 3.9      CO2 23   BUN 9   CREATININE 0.60   GLUC 78    , LFTs:   .     05/15/25  1633   AST 11*   ALT 11   ALB 3.3*   TBILI 0.26   ALKPHOS 87        Prudence Hinson MD  OBGYN PGY-4  05/16/25 6:31 AM

## 2025-05-16 NOTE — NURSING NOTE
RN entered room to obtain a 2 hour post-prandial blood sugar and found pt eating. Spoke with Dr. Lane. Okay with obtaining blood sugar 2 hours from now. Reminded pt not to eat anymore so that we can obtain the post-prandial blood sugar. Pt verbalized understanding.

## 2025-05-17 LAB — BACTERIA UR CULT: ABNORMAL

## 2025-05-20 ENCOUNTER — ANCILLARY PROCEDURE (OUTPATIENT)
Age: 30
End: 2025-05-20
Attending: NURSE PRACTITIONER
Payer: MEDICARE

## 2025-05-20 ENCOUNTER — ULTRASOUND (OUTPATIENT)
Age: 30
End: 2025-05-20
Attending: OBSTETRICS & GYNECOLOGY
Payer: MEDICARE

## 2025-05-20 VITALS
DIASTOLIC BLOOD PRESSURE: 56 MMHG | HEART RATE: 101 BPM | WEIGHT: 208.6 LBS | BODY MASS INDEX: 36.96 KG/M2 | HEIGHT: 63 IN | SYSTOLIC BLOOD PRESSURE: 100 MMHG

## 2025-05-20 DIAGNOSIS — Z36.89 ENCOUNTER FOR ULTRASOUND TO CHECK FETAL GROWTH: ICD-10-CM

## 2025-05-20 DIAGNOSIS — Z3A.34 34 WEEKS GESTATION OF PREGNANCY: Primary | ICD-10-CM

## 2025-05-20 DIAGNOSIS — O09.293: ICD-10-CM

## 2025-05-20 DIAGNOSIS — Z87.59 HISTORY OF IUFD: ICD-10-CM

## 2025-05-20 PROCEDURE — 76816 OB US FOLLOW-UP PER FETUS: CPT | Performed by: OBSTETRICS & GYNECOLOGY

## 2025-05-20 PROCEDURE — 59025 FETAL NON-STRESS TEST: CPT | Performed by: OBSTETRICS & GYNECOLOGY

## 2025-05-20 NOTE — PROGRESS NOTES
Repeat Non-Stress Testing:    Patient verbalizes +FM. Pt denies ALL:               Leaking of fluid   Contractions   Vaginal bleeding   Decreased fetal movement    Patient is performing daily kick counts. Patient has no questions or concerns.   NST strip reviewed by Dr. Mcbride in-person.

## 2025-05-21 PROBLEM — O35.EXX0 PYELECTASIS OF FETUS ON PRENATAL ULTRASOUND: Status: RESOLVED | Noted: 2025-03-20 | Resolved: 2025-05-21

## 2025-05-21 NOTE — PROGRESS NOTES
[facility]: Merline was seen today for NST (found under the pregnancy episode) which I reviewed the RN assessment and agree, and fetal growth ultrasound (report with images are contained in the ultrasound report located under Chart Review tab in Epic).  Please don't hesitate to contact our office with any concerns or questions.  -Adriana Mcbride MD

## 2025-05-22 ENCOUNTER — ROUTINE PRENATAL (OUTPATIENT)
Age: 30
End: 2025-05-22
Attending: OBSTETRICS & GYNECOLOGY
Payer: MEDICARE

## 2025-05-22 VITALS
HEIGHT: 63 IN | SYSTOLIC BLOOD PRESSURE: 98 MMHG | BODY MASS INDEX: 37.74 KG/M2 | WEIGHT: 213 LBS | HEART RATE: 80 BPM | DIASTOLIC BLOOD PRESSURE: 52 MMHG

## 2025-05-22 DIAGNOSIS — Z3A.35 35 WEEKS GESTATION OF PREGNANCY: ICD-10-CM

## 2025-05-22 DIAGNOSIS — O09.293 HISTORY OF STILLBIRTH IN CURRENTLY PREGNANT PATIENT, THIRD TRIMESTER: Primary | ICD-10-CM

## 2025-05-22 PROCEDURE — 59025 FETAL NON-STRESS TEST: CPT | Performed by: OBSTETRICS & GYNECOLOGY

## 2025-05-22 NOTE — PROGRESS NOTES
Repeat Non-Stress Testing:    Patient verbalizes +FM. Pt denies ALL:               Leaking of fluid   Contractions   Vaginal bleeding   Decreased fetal movement    Patient is performing daily kick counts. Patient has no questions or concerns.   NST strip reviewed by Dr. Martin in-person.   Treatment 1: Taltz 80 mg injected every 4 weeks Action 1: Continue Detail Level: Zone

## 2025-05-27 ENCOUNTER — ANCILLARY PROCEDURE (OUTPATIENT)
Age: 30
End: 2025-05-27
Attending: OBSTETRICS & GYNECOLOGY
Payer: MEDICARE

## 2025-05-27 ENCOUNTER — ULTRASOUND (OUTPATIENT)
Age: 30
End: 2025-05-27
Attending: OBSTETRICS & GYNECOLOGY
Payer: MEDICARE

## 2025-05-27 VITALS
BODY MASS INDEX: 37.1 KG/M2 | HEART RATE: 105 BPM | HEIGHT: 63 IN | SYSTOLIC BLOOD PRESSURE: 94 MMHG | DIASTOLIC BLOOD PRESSURE: 58 MMHG | WEIGHT: 209.4 LBS

## 2025-05-27 DIAGNOSIS — Z3A.36 36 WEEKS GESTATION OF PREGNANCY: Primary | ICD-10-CM

## 2025-05-27 DIAGNOSIS — O09.293 HISTORY OF STILLBIRTH IN CURRENTLY PREGNANT PATIENT, THIRD TRIMESTER: ICD-10-CM

## 2025-05-27 DIAGNOSIS — Z3A.36 36 WEEKS GESTATION OF PREGNANCY: ICD-10-CM

## 2025-05-27 PROCEDURE — 59025 FETAL NON-STRESS TEST: CPT | Performed by: OBSTETRICS & GYNECOLOGY

## 2025-05-27 PROCEDURE — 76815 OB US LIMITED FETUS(S): CPT

## 2025-05-27 PROCEDURE — 76815 OB US LIMITED FETUS(S): CPT | Performed by: OBSTETRICS & GYNECOLOGY

## 2025-05-27 NOTE — PROGRESS NOTES
[facility]: Merline was seen today for NST (found under the pregnancy episode) which I reviewed the RN assessment and agree, and KAN (report with images are contained in the ultrasound report located under Chart Review tab in Epic).  Please don't hesitate to contact our office with any concerns or questions.  -Adriana Mcbride MD

## 2025-05-28 ENCOUNTER — ROUTINE PRENATAL (OUTPATIENT)
Dept: OBGYN CLINIC | Facility: CLINIC | Age: 30
End: 2025-05-28

## 2025-05-28 ENCOUNTER — NURSE TRIAGE (OUTPATIENT)
Dept: OTHER | Facility: OTHER | Age: 30
End: 2025-05-28

## 2025-05-28 VITALS
SYSTOLIC BLOOD PRESSURE: 122 MMHG | HEIGHT: 63 IN | WEIGHT: 210 LBS | HEART RATE: 112 BPM | DIASTOLIC BLOOD PRESSURE: 62 MMHG | BODY MASS INDEX: 37.21 KG/M2 | OXYGEN SATURATION: 96 %

## 2025-05-28 DIAGNOSIS — Z3A.30 30 WEEKS GESTATION OF PREGNANCY: ICD-10-CM

## 2025-05-28 DIAGNOSIS — O98.513 HERPES SIMPLEX VIRUS TYPE 2 (HSV-2) INFECTION AFFECTING PREGNANCY IN THIRD TRIMESTER: ICD-10-CM

## 2025-05-28 DIAGNOSIS — B00.9 HERPES SIMPLEX VIRUS TYPE 2 (HSV-2) INFECTION AFFECTING PREGNANCY IN THIRD TRIMESTER: ICD-10-CM

## 2025-05-28 DIAGNOSIS — R76.8 HSV-2 SEROPOSITIVE: ICD-10-CM

## 2025-05-28 DIAGNOSIS — O09.93 ENCOUNTER FOR SUPERVISION OF HIGH RISK PREGNANCY IN THIRD TRIMESTER, ANTEPARTUM: Primary | ICD-10-CM

## 2025-05-28 PROCEDURE — 87150 DNA/RNA AMPLIFIED PROBE: CPT | Performed by: NURSE PRACTITIONER

## 2025-05-28 RX ORDER — VALACYCLOVIR HYDROCHLORIDE 1 G/1
1000 TABLET, FILM COATED ORAL DAILY
Qty: 45 TABLET | Refills: 0 | Status: SHIPPED | OUTPATIENT
Start: 2025-05-28 | End: 2025-07-12

## 2025-05-28 NOTE — PROGRESS NOTES
30-year-old -0-0-1 at 36w2d gestation.  Obesity, history genital herpes, history of IUFD at 40 weeks  Sinhala speaking, cyracom utilized.    Reports having sweats.  Also feeling very tired.    Low dose ASA- discontinued  Tdap declined  Consent completed    Baby is active, denies leakage of fluid, vaginal bleeding or uterine contractions.  S=D, normal FHTs, normal BP    Twice weekly NST with KAN starting at 32 weeks-- scheduled.  Has growth scan 25- efw 43%  Patient elects for , scheduled 6/10/2025    [ ] GBS- collected today  RX sent for suppressive valacyclovir.  Advised to start today.  RV one week

## 2025-05-29 ENCOUNTER — ROUTINE PRENATAL (OUTPATIENT)
Age: 30
End: 2025-05-29
Attending: OBSTETRICS & GYNECOLOGY
Payer: MEDICARE

## 2025-05-29 VITALS
HEART RATE: 86 BPM | BODY MASS INDEX: 37.53 KG/M2 | DIASTOLIC BLOOD PRESSURE: 54 MMHG | SYSTOLIC BLOOD PRESSURE: 96 MMHG | WEIGHT: 211.8 LBS | HEIGHT: 63 IN

## 2025-05-29 DIAGNOSIS — Z3A.36 36 WEEKS GESTATION OF PREGNANCY: ICD-10-CM

## 2025-05-29 DIAGNOSIS — O09.293 HISTORY OF STILLBIRTH IN CURRENTLY PREGNANT PATIENT, THIRD TRIMESTER: Primary | ICD-10-CM

## 2025-05-29 PROCEDURE — 59025 FETAL NON-STRESS TEST: CPT | Performed by: OBSTETRICS & GYNECOLOGY

## 2025-05-29 NOTE — TELEPHONE ENCOUNTER
"REASON FOR CONVERSATION: Pregnancy Problem    SYMPTOMS: brown/white vaginal discharge    OTHER HEALTH INFORMATION: Patient states she had OB appointment today with exam. RN advise the discharge is normal after exam.     PROTOCOL DISPOSITION: Home Care    CARE ADVICE PROVIDED: RN reviewed expected symptoms after exams and review when to call back; abdominal pain, vaginal bleeding.     PRACTICE FOLLOW-UP: NA      Reason for Disposition   Pinkish or brownish mucous discharge after a pelvic examination (brief episode)    Answer Assessment - Initial Assessment Questions  1. DISCHARGE: \"Describe the discharge.\" (e.g., white, yellow, green, gray, foamy, cottage cheese-like)        Brown and white     2. ODOR: \"Is there a bad odor?\"        None     3. ONSET: \"When did the discharge begin?\"        About 3 hours ago     4. RASH: \"Is there a rash in that area?\" If Yes, ask: \"Describe it.\" (e.g., redness, blisters, sores, bumps)        None     5. ABDOMEN PAIN: \"Are you having any abdomen pain?\" If Yes, ask: \"What does it feel like?\" (e.g., crampy, dull, intermittent, constant)         None     6. ABDOMEN PAIN SEVERITY: If present, ask: \"How bad is it?\"  (e.g., Scale 1-10; mild, moderate, or severe)        NA    7. CAUSE: \"What do you think is causing the discharge?\"        Unknown     8. OTHER SYMPTOMS: \"Do you have any other symptoms?\" (e.g., fever, itching, vaginal bleeding, pain with urination)        Back bothers her     9. FAHEEM: \"What date are you expecting to deliver?\"         6/23/25    10. PREGNANCY: \"How many weeks pregnant are you?\"          36 weeks    Protocols used: Pregnancy - Vaginal Discharge-Adult-AH    "

## 2025-05-30 LAB — GP B STREP DNA SPEC QL NAA+PROBE: NEGATIVE

## 2025-05-31 ENCOUNTER — NURSE TRIAGE (OUTPATIENT)
Dept: OTHER | Facility: OTHER | Age: 30
End: 2025-05-31

## 2025-05-31 ENCOUNTER — HOSPITAL ENCOUNTER (OUTPATIENT)
Facility: HOSPITAL | Age: 30
Discharge: HOME | End: 2025-05-31
Attending: OBSTETRICS & GYNECOLOGY | Admitting: OBSTETRICS & GYNECOLOGY
Payer: MEDICARE

## 2025-05-31 VITALS
DIASTOLIC BLOOD PRESSURE: 55 MMHG | TEMPERATURE: 98 F | HEART RATE: 92 BPM | RESPIRATION RATE: 16 BRPM | SYSTOLIC BLOOD PRESSURE: 100 MMHG

## 2025-05-31 DIAGNOSIS — O09.92 ENCOUNTER FOR SUPERVISION OF HIGH RISK PREGNANCY IN SECOND TRIMESTER, ANTEPARTUM: ICD-10-CM

## 2025-05-31 PROBLEM — O26.893 ABDOMINAL PAIN DURING PREGNANCY IN THIRD TRIMESTER: Status: ACTIVE | Noted: 2025-05-31

## 2025-05-31 PROBLEM — R10.9 ABDOMINAL PAIN DURING PREGNANCY IN THIRD TRIMESTER: Status: ACTIVE | Noted: 2025-05-31

## 2025-05-31 PROCEDURE — NC001 PR NO CHARGE: Performed by: OBSTETRICS & GYNECOLOGY

## 2025-05-31 PROCEDURE — 99213 OFFICE O/P EST LOW 20 MIN: CPT

## 2025-05-31 RX ORDER — POLYETHYLENE GLYCOL 3350 17 G/17G
17 POWDER, FOR SOLUTION ORAL ONCE
Qty: 17 G | Refills: 0 | Status: SHIPPED | OUTPATIENT
Start: 2025-05-31 | End: 2025-06-06

## 2025-05-31 NOTE — TELEPHONE ENCOUNTER
"REASON FOR CONVERSATION: Abdominal Pain    SYMPTOMS: Lower back/abdominal pain, white vaginal discharge, headache with stars in vision     OTHER HEALTH INFORMATION: N/A    PROTOCOL DISPOSITION: Go to  Now    CARE ADVICE PROVIDED: On call provider advised patient to present to L&D for triage. Patient made aware and verbalized understanding.     PRACTICE FOLLOW-UP: None at this time      Reason for Disposition   MODERATE-SEVERE abdominal pain (e.g., interferes with normal activities, awakens from sleep)    Answer Assessment - Initial Assessment Questions  1. LOCATION: \"Where does it hurt?\"         Lower abdominal pain and lower back pain     2. RADIATION: \"Does the pain shoot anywhere else?\" (e.g., chest, back)        Denies, only lower abdomen and lower back     3. ONSET: \"When did the pain begin?\" (Minutes, hours or days ago)         Last night     4. SUDDEN: \"Gradual or sudden onset?\"        Since last night     5. PATTERN: \"Does the pain come and go, or has it been constant since it started?\"         Comes and goes every 3-5 minutes lasting about 3 minutes     6. SEVERITY: \"How bad is the pain?\" \"What does it keep you from doing?\"  (e.g., Scale 1-10; mild, moderate, or severe)        5/10    7. RECURRENT SYMPTOM: \"Have you ever had this type of stomach pain before?\" If Yes, ask: \"When was the last time?\" and \"What happened that time?\"         Denies recurrent symptoms with this pregnancy     8. CAUSE: \"What do you think is causing the stomach pain?        Unknown cause, patient believes these are contractions     9. RELIEVING/AGGRAVATING FACTORS: \"What makes it better or worse?\" (e.g., antacids, bowel movement, movement)        Worse with walking     10. FETAL MOVEMENT: \"Has the baby's movement decreased or changed significantly from normal?\"          Fetal movement is stronger and causing vaginal pain     11. OTHER SYMPTOMS: \"Do you have any other symptoms?\" (e.g., back pain, contractions, diarrhea, fever, " "headache, urination pain, vaginal bleeding, vaginal discharge, vomiting)          Headache that comes and goes, seeing stars that come and go, some SOB at rest, denies chest pain, vaginal discharge that is white and mucousy, denies vaginal bleeding, denies LH/dizziness     12. FAHEEM: \"What date are you expecting to deliver?\"          6/23/25    Protocols used: Pregnancy - Abdominal Pain Greater Than 20 Weeks EGA-Adult-    "

## 2025-05-31 NOTE — DISCHARGE INSTR - AVS FIRST PAGE
Pregnancy at 35 to 38 Weeks   WHAT YOU NEED TO KNOW:   What changes are happening with my body?  You are considered full term at the beginning of 37 weeks. Your breathing may be easier if your baby has moved down into a head-down position. You may need to urinate more often because the baby may be pressing on your bladder. You may also feel more discomfort and get tired easily.  How do I care for myself at this stage of my pregnancy?       Eat a variety of healthy foods.  Healthy foods include fruits, vegetables, whole-grain breads, low-fat dairy foods, beans, lean meats, and fish. Drink liquids as directed. Ask how much liquid to drink each day and which liquids are best for you. Limit caffeine to less than 200 milligrams each day. Limit your intake of fish to 2 servings each week. Choose fish low in mercury such as canned light tuna, shrimp, salmon, cod, or tilapia. Do not  eat fish high in mercury such as swordfish, tilefish, licha mackerel, and shark.         Take prenatal vitamins as directed.  Your need for certain vitamins and minerals, such as folic acid, increases during pregnancy. Prenatal vitamins provide some of the extra vitamins and minerals you need. Prenatal vitamins may also help to decrease the risk of certain birth defects.         Rest as needed.  Put your feet up if you have swelling in your ankles and feet.         Talk to your healthcare provider about exercise.  Moderate exercise can help you stay fit. Your healthcare provider will help you plan an exercise program that is safe for you during pregnancy.         Do not smoke.  Smoking increases your risk of a miscarriage and other health problems during your pregnancy. Smoking can cause your baby to be born early or weigh less at birth. Ask your healthcare provider for information if you need help quitting.    Do not drink alcohol.  Alcohol passes from your body to your baby through the placenta. It can affect your baby's brain development and  cause fetal alcohol syndrome (FAS). FAS is a group of conditions that causes mental, behavior, and growth problems.    Talk to your healthcare provider before you take any medicines.  Many medicines may harm your baby if you take them when you are pregnant. Do not take any medicines, vitamins, herbs, or supplements without first talking to your healthcare provider. Never use illegal or street drugs (such as marijuana or cocaine) while you are pregnant.    What are some safety tips during pregnancy?   Avoid hot tubs and saunas.  Do not use a hot tub or sauna while you are pregnant, especially during your first trimester. Hot tubs and saunas may raise your baby's temperature and increase the risk of birth defects.    Avoid toxoplasmosis.  This is an infection caused by eating raw meat or being around infected cat feces. It can cause birth defects, miscarriages, and other problems. Wash your hands after you touch raw meat. Make sure any meat is well-cooked before you eat it. Avoid raw eggs and unpasteurized milk. Use gloves or ask someone else to clean your cat's litter box while you are pregnant.         Ask your healthcare provider about travel.  The most comfortable time to travel is during the second trimester. Ask your provider if you can travel after 36 weeks. You may not be able to travel in an airplane after 36 weeks. He or she may also recommend you avoid long road trips.    What changes are happening with my baby?  By 38 weeks, your baby may weigh between 6 and 9 pounds. Your baby may be about 14 inches long from the top of the head to the rump (baby's bottom). Your baby hears well enough to know your voice. As your baby gets larger, you may feel fewer kicks and more stretching and rolling. Your baby may move into a head-down position. Your baby will also rest lower in your abdomen.  What do I need to know about prenatal care?  Your healthcare provider will check your blood pressure and weight. You may also  need the following:  A urine test  may also be done to check for sugar and protein. These can be signs of gestational diabetes or infection. Protein in your urine may also be a sign of preeclampsia. Preeclampsia is a condition that can develop during week 20 or later of your pregnancy. It causes high blood pressure, and it can cause problems with your kidneys and other organs.    A gestational diabetes screen  may be done. Your healthcare provider may order either a 1-step or 2-step oral glucose tolerance test (OGTT).     1-step OGTT:  Your blood sugar level will be tested after you have not eaten for 8 hours (fasting). You will then be given a glucose drink. Your level will be tested again 1 hour and 2 hours after you finish the drink.    2-step OGTT:  You do not have to fast for the first part of the test. You will have the glucose drink at any time of day. Your blood sugar level will be checked 1 hour later. If your blood sugar is higher than a certain level, another test will be ordered. You will fast and your blood sugar level will be tested. You will have the glucose drink. Your blood will be tested again 1 hour, 2 hours, and 3 hours after you finish the glucose drink.    A blood test  may be done to check for anemia (low iron level).    A Tdap vaccine  may be recommended by your healthcare provider.    A group B strep test  is a test that is done to check for group B strep infection. Group B strep is a type of bacteria that may be found in the vagina or rectum. It can be passed to your baby during delivery if you have it. Your healthcare provider will take swab your vagina or rectum and send the sample to the lab for tests.    Fundal height  is a measurement of your uterus to check your baby's growth. This number is usually the same as the number of weeks that you have been pregnant. Your healthcare provider may also check your baby's position.    Your baby's heart rate  will be checked.    When should I seek  immediate care?   You develop a severe headache that does not go away.    You have new or increased vision changes, such as blurred or spotted vision.    You have new or increased swelling in your face or hands.    You have vaginal spotting or bleeding.    Your water broke or you feel warm water gushing or trickling from your vagina.    When should I call my obstetrician?   You have more than 5 contractions in 1 hour.    You notice any changes in your baby's movements.    You have abdominal cramps, pressure, or tightening.    You have a change in vaginal discharge.    You have chills or a fever.    You have vaginal itching, burning, or pain.    You have yellow, green, white, or foul-smelling vaginal discharge.    You have pain or burning when you urinate, less urine than usual, or pink or bloody urine.    You have questions or concerns about your condition or care.    CARE AGREEMENT:   You have the right to help plan your care. Learn about your health condition and how it may be treated. Discuss treatment options with your healthcare providers to decide what care you want to receive. You always have the right to refuse treatment. The above information is an  only. It is not intended as medical advice for individual conditions or treatments. Talk to your doctor, nurse or pharmacist before following any medical regimen to see if it is safe and effective for you.  © Copyright [employer] 2022 Information is for End User's use only and may not be sold, redistributed or otherwise used for commercial purposes. All illustrations and images included in CareNotes® are the copyrighted property of [employer] or [employer]

## 2025-05-31 NOTE — PROGRESS NOTES
L&D Triage Note - OB/GYN  Merline Madden 30 y.o. female MRN: [id number]  Unit/Bed#:  TRIAGE  Encounter: [id number]      ASSESSMENT:    Merline Madden is a 30 y.o.  at 36w5d who was evaluated today in triage for contractions. SVE 1.5/50/-3 with rare CTX on tocometer. The patient does not appear to be in labor and it is safe to discharge home.     PLAN:    1) Contractions  - SVE: 1.5/50/-3  - [place]: 2 contractions in 40 mins, patient with sexual intercourse today, reports contractions are very mild and infrequent currently  - Encouraged PO hydration, warm bath/shower, reassurance    2) vaginal discharge  - Physiologic vaginal discharge noted on speculum exam  - No pooling, no abnormal discharge or odor    3) 36 weeks gestation of pregnancy  - Continue routine prenatal care  - Discharge from OB triage with labor precautions    - Reviewed rupture of membranes, false vs true labor, decreased fetal movement, and vaginal bleeding   - Pt to call provider with any concerns and follow up at her next scheduled prenatal appointment    - Case discussed with Dr. Toussaint    SUBJECTIVE:    Merline Madden 30 y.o.  at 36w5d with an Estimated Date of Delivery: 25 presenting with abdominal pain.  She reports last night she had intercourse and is now having occasional abdominal pain that is coming and going.  She reports last night after intercourse it was much worse than it has subsided greatly.  She rates the pain currently about 4 out of 10 she reports some increased white discharge.  She also reports a lot of fetal movement and some pressure in the vaginal area when movement occurs.  She has no vaginal bleeding.       Her past obstetrical history is significant for prior fetal demise. This pregnancy has been complicated by obesity, history of bariatric surgery, HSV, anxiety, rubella nonimmune.    Contractions: as described  Leakage of fluid: Denies  Vaginal Bleeding:  Denies  Fetal movement: present    OBJECTIVE:    Vitals:    05/31/25 1651   BP: 100/55   Pulse: 92   Resp: 16   Temp: 98 °F (36.7 °C)       ROS:  Constitutional: Negative  Respiratory: Negative  Cardiovascular: Negative    Gastrointestinal: Negative    General Physical Exam:  General: Well appearing, no distress  Respiratory: Unlabored breathing  Cardiovascular: Regular rate.  Abdomen: Soft, gravid, nontender  Fundal Height: Appropriate for gestational age.  Extremities: Warm and well perfused.  Non tender.      Fetal monitoring:  Fetal heart rate: Baseline Rate (FHR): 140 bpm  Variability: Moderate  Accelerations: 15 x 15 or greater  Decelerations: None  [place]: Contraction Frequency (minutes): x2  Contraction Duration (seconds): 50      Cervical Exam  SVE: 1.5/50/-3        Luz Rico DO  5/31/2025  5:41 PM

## 2025-06-01 ENCOUNTER — NURSE TRIAGE (OUTPATIENT)
Dept: OTHER | Facility: OTHER | Age: 30
End: 2025-06-01

## 2025-06-01 ENCOUNTER — HOSPITAL ENCOUNTER (OUTPATIENT)
Facility: HOSPITAL | Age: 30
Discharge: HOME | End: 2025-06-02
Attending: OBSTETRICS & GYNECOLOGY | Admitting: OBSTETRICS & GYNECOLOGY
Payer: MEDICARE

## 2025-06-01 VITALS
TEMPERATURE: 97.9 F | SYSTOLIC BLOOD PRESSURE: 101 MMHG | WEIGHT: 211.8 LBS | BODY MASS INDEX: 37.53 KG/M2 | HEART RATE: 85 BPM | HEIGHT: 63 IN | RESPIRATION RATE: 18 BRPM | DIASTOLIC BLOOD PRESSURE: 54 MMHG

## 2025-06-01 PROCEDURE — NC001 PR NO CHARGE: Performed by: OBSTETRICS & GYNECOLOGY

## 2025-06-02 ENCOUNTER — RESULTS FOLLOW-UP (OUTPATIENT)
Age: 30
End: 2025-06-02

## 2025-06-02 PROBLEM — O26.899 VAGINAL DISCHARGE DURING PREGNANCY: Status: ACTIVE | Noted: 2025-06-02

## 2025-06-02 PROBLEM — N89.8 VAGINAL DISCHARGE DURING PREGNANCY: Status: ACTIVE | Noted: 2025-06-02

## 2025-06-02 PROCEDURE — 99212 OFFICE O/P EST SF 10 MIN: CPT

## 2025-06-02 PROCEDURE — NC001 PR NO CHARGE: Performed by: OBSTETRICS & GYNECOLOGY

## 2025-06-02 PROCEDURE — 59025 FETAL NON-STRESS TEST: CPT

## 2025-06-02 PROCEDURE — 76815 OB US LIMITED FETUS(S): CPT

## 2025-06-02 NOTE — PROCEDURES
Merline PAEZ Vernonsuresh, a  at 37w0d with an FAHEEM of 2025, by Ultrasound, was seen at [facility] for the following procedure(s): $Procedure Type: KAN, NST]    Nonstress Test  Reason for NST: Routine  Variability: Moderate  Decelerations: None  Acoustic Stimulator: No  Baseline: 155 BPM  Uterine Irritability: No  Contractions: Not present    4 Quadrant KAN  KAN Q1 (cm): 7.3 cm  KAN Q2 (cm): 4.1 cm  KAN Q3 (cm): 1.8 cm  KAN Q4 (cm): 2 cm  KAN TOTAL (cm): 15.2 cm      Interpretation  Nonstress Test Interpretation: Reactive  Overall Impression: Reassuring            Neha Steward MD   PGY-2, OBGYN  2025  12:46 AM

## 2025-06-02 NOTE — PROGRESS NOTES
"L&D Triage Note - OB/GYN  Merline Madden 30 y.o. female MRN: [id number]  Unit/Bed#:  TRIAGE  Encounter: [id number]    Patient is seen by [facility]    ASSESSMENT  Merline Madden is a 30 y.o.  at 36w6d presenting with watery vaginal discharge and irregular contractions. Low back pain appears musculoskeletal in nature. Rupture workup negative. KAN unchanged from KAN at  on 25. SVE unchanged from previous exam. NST reactive and reassuring. Stable for discharge.     PLAN  #1. Vaginal discharge:   Physiologic vaginal discharge   Rupture workup negative w/o signs of infection  Reassurance     #2. Contractions:   No ctx on toco   SVE unchanged from previous exam on   Reassurance     Discharge instructions  Patient instructed to call if experiencing worsening contractions, vaginal bleeding, loss of fluid or decreased fetal movement.  Will follow up  on 6/3/25.  Will follow up with OBGYN on 25    D/w Dr. Waters   ______________    SUBJECTIVE    FAHEEM: Estimated Date of Delivery: 25    HPI:  History obtained with assistance of  Tariq [id number]  30 y.o.  36w6d presents with complaint of watery vaginal discharge. She was eating dinner, and when she stood up, noticed her chair as well as underwear were damp. She has not had episodes of continued leaking. She reports 2 contractions in 20 minutes, they are not persistent. She does note some low back pain. She had an episode of spotting after SVE on Saturday. No longer spotting. Feeling frequent fetal movement.     Her obstetrical history is significant for IUFD at 40 weeks, Hepatitis C antibody positive    ROS:  Constitutional: Negative  Respiratory: Negative  Cardiovascular: Negative    Gastrointestinal: Negative    OBJECTIVE:    Vitals:  /54   Pulse 85   Temp 97.9 °F (36.6 °C) (Temporal)   Resp 18   Ht 5' 3\" (1.6 m)   Wt 96.1 kg (211 lb 12.8 oz)   LMP 2024   BMI 37.52 " kg/m²   Body mass index is 37.52 kg/m².    Physical Exam  Vitals reviewed. Exam conducted with a chaperone present.   Constitutional:       Appearance: Normal appearance. She is not ill-appearing.   HENT:      Head: Normocephalic.     Eyes:      Conjunctiva/sclera: Conjunctivae normal.       Cardiovascular:      Rate and Rhythm: Normal rate.   Pulmonary:      Effort: Pulmonary effort is normal.   Abdominal:      Palpations: Abdomen is soft.      Tenderness: There is no abdominal tenderness. There is no guarding.      Comments: Gravid, fundal height appropriate for gestational age     Musculoskeletal:         General: No tenderness.      Right lower leg: No edema.      Left lower leg: No edema.     Skin:     General: Skin is warm and dry.      Coloration: Skin is not jaundiced.     Neurological:      Mental Status: She is alert.     Psychiatric:         Mood and Affect: Mood normal.         Behavior: Behavior normal.         Speculum exam:  Normal external female genitalia  Cervix fully visualized and not visibly dilated  Physiologic discharge  No bleeding, pooling, abnormal discharge, or lesions noted    Microscopy:     Infection:   - No clue cells    - No hyphae   - No trichomonads present    Membrane status   - No ferning   - Negative nitrazene   - No pooling     SVE:  1.5/50/-3    FHT:  Baseline Rate (FHR): 155 bpm  Variability: Moderate  Accelerations: 15 x 15 or greater  Decelerations: None  NST reactive    TOCO:   No contractions    IMAGING:      TAUS   KAN      - Q1 7.29 cm     - Q2 4.12 cm     - Q3 1.82 cm     - Q4 2.04 cm     - Total: 15.27 cm   Placenta: Anterior   Presentation: Vertex            Labs: No results found for this or any previous visit (from the past 24 hours).        Neha Steward MD  6/2/2025  12:40 AM

## 2025-06-02 NOTE — TELEPHONE ENCOUNTER
"REASON FOR CONVERSATION: Pregnancy Problem    SYMPTOMS: she is 36w6d with baby #2 and states she believes her water broke. She stood up about 20 minutes ago from eating and her underwear and chair was wet. She is having no contractions, no pain, no other symptoms     OTHER HEALTH INFORMATION: patient was in the hospital and was 2 cm dilated    PROTOCOL DISPOSITION: Go to  Now    CARE ADVICE PROVIDED: On call provider advised patient to be evaluated in L&D     PRACTICE FOLLOW-UP: NA      Reason for Disposition   Leakage of fluid from vagina  (Exception: Patient is uncertain, but thinks it might be urine incontinence.)    Answer Assessment - Initial Assessment Questions  1. ONSET: \"When did you notice the fluid coming out of your vagina?\"           About 15 minutes ago - 10:15 pm     2. CONTRACTIONS: \"Are you having any contractions?\" If Yes, ask: \"Describe the contractions that you are having.\" (e.g., duration, frequency, regularity, severity)        None     3. FAHEEM: \"What date are you expecting to deliver?\"        6/23     4. PARITY: \"Have you had a baby before?\" If Yes, ask: \"How long did the labor last?\"        Baby #2     5. FETAL MOVEMENT: \"Has the baby's movement decreased or changed significantly from normal?\"        Moving a lot - this is an increase     6. OTHER SYMPTOMS: \"Do you have any other symptoms?\" (e.g., abdomen pain, fever, hand or face swelling, vaginal bleeding)        When she used the bathroom she felt a pulsating pinch - but no pain or other symptoms    Protocols used: Pregnancy - Rupture of Membranes Suspected-Adult-AH    "

## 2025-06-03 ENCOUNTER — ULTRASOUND (OUTPATIENT)
Age: 30
End: 2025-06-03
Payer: MEDICARE

## 2025-06-03 ENCOUNTER — ANCILLARY PROCEDURE (OUTPATIENT)
Age: 30
End: 2025-06-03
Attending: OBSTETRICS & GYNECOLOGY
Payer: MEDICARE

## 2025-06-03 VITALS
SYSTOLIC BLOOD PRESSURE: 100 MMHG | WEIGHT: 213 LBS | DIASTOLIC BLOOD PRESSURE: 52 MMHG | HEIGHT: 63 IN | HEART RATE: 88 BPM | BODY MASS INDEX: 37.74 KG/M2

## 2025-06-03 DIAGNOSIS — O09.293 HISTORY OF STILLBIRTH IN CURRENTLY PREGNANT PATIENT, THIRD TRIMESTER: ICD-10-CM

## 2025-06-03 DIAGNOSIS — Z3A.37 37 WEEKS GESTATION OF PREGNANCY: Primary | ICD-10-CM

## 2025-06-03 DIAGNOSIS — Z3A.37 37 WEEKS GESTATION OF PREGNANCY: ICD-10-CM

## 2025-06-03 PROCEDURE — 59025 FETAL NON-STRESS TEST: CPT | Performed by: OBSTETRICS & GYNECOLOGY

## 2025-06-03 PROCEDURE — 76815 OB US LIMITED FETUS(S): CPT | Performed by: OBSTETRICS & GYNECOLOGY

## 2025-06-03 NOTE — PATIENT INSTRUCTIONS
Thank you for choosing us for your  care today.  If you have any questions about your ultrasound or care, please do not hesitate to contact us or your primary obstetrician.        Some general instructions for your pregnancy are:    Exercise: Aim for 150 minutes per week of regular exercise.  Walking is great!  Nutrition: Choose healthy sources of calcium, iron, and protein.  Avoid ultraprocessed foods and added sugar.  Learn about Preeclampsia: preeclampsia is a common, potentially serious high blood pressure complication in pregnancy.  A blood pressure of 140mmHg (systolic or top number) or 90mmHg (diastolic or bottom number) should be evaluated by your doctor.  Aspirin is sometimes prescribed in early pregnancy to prevent preeclampsia in women with risk factors - ask your obstetrician if you should be on this medication.  For more resources, visit:  https://[email or web address].[email or web address]  If you smoke, please try to quit completely but also try to reduce your smoking by as much as possible (as soon as possible).  Do not vape.  Please also avoid cannabis products.  Other warning signs to watch out for in pregnancy or postpartum: chest pain, obstructed breathing or shortness of breath, seizures, thoughts of hurting yourself or your baby, bleeding, a painful or swollen leg, fever, or headache (see [employer] POST-BIRTH Warning Signs campaign).  If these happen call 911.  Itching is also not normal in pregnancy and if you experience this, especially over your hands and feet, potentially worse at night, notify your doctors.     Nonstress Test for Pregnancy   WHAT YOU NEED TO KNOW:   What do I need to know about a nonstress test?  A nonstress test measures your baby's heart rate and movements. Nonstress means that no stress will be placed on your baby during the test.  How do I prepare for a nonstress test?  Your healthcare provider will talk to you about how to prepare for this test. Your provider may  tell you to eat and drink plenty of liquids before your test. If you smoke, you may be asked not to smoke within 2 hours before the test. Your provider will also tell you which medicines to take or not take on the day of your test.  What will happen during a nonstress test?  You may be asked to lie down or recline back for the test on a bed. One or 2 belts with sensors will be placed around your abdomen. Your baby's heart rate will be recorded with a machine. If your baby does not move, your baby may be asleep. Your healthcare provider may make a noise near your abdomen to try to wake your baby. The test usually takes about 20 minutes, but can take longer if your baby needs to be awakened.        What do I need to know about the test results?  Your baby will be expected to move at least 2 times for a certain amount of time. Your baby's heart rate will be expected to go up by a certain number of beats per minute during movement. If your baby does not move as expected, the test may need to be repeated or you may need other tests.  CARE AGREEMENT:   You have the right to help plan your care. Learn about your health condition and how it may be treated. Discuss treatment options with your healthcare providers to decide what care you want to receive. You always have the right to refuse treatment. The above information is an  only. It is not intended as medical advice for individual conditions or treatments. Talk to your doctor, nurse or pharmacist before following any medical regimen to see if it is safe and effective for you.  © Copyright [employer] 2023 Information is for End User's use only and may not be sold, redistributed or otherwise used for commercial purposes. Kick Counts in Pregnancy   AMBULATORY CARE:   Kick counts  measure how much your baby is moving in your womb. A kick from your baby can be felt as a twist, turn, swish, roll, or jab. It is common to feel your baby kicking at 26 to 28 weeks of  pregnancy. You may feel your baby kick as early as 20 weeks of pregnancy. You may want to start counting at 28 weeks.   Contact your doctor immediately if:   You feel a change in the number of kicks or movements of your baby.      You feel fewer than 10 kicks within 2 hours.      You have questions or concerns about your baby's movements.     Why measure kick counts:  Your baby's movement may provide information about your baby's health. He or she may move less, or not at all, if there are problems. Your baby may move less if he or she is not getting enough oxygen or nutrition from the placenta. Do not smoke while you are pregnant. Smoking decreases the amount of oxygen that gets to your baby. Talk to your healthcare provider if you need help to quit smoking. Tell your healthcare provider as soon as you feel a change in your baby's movements.  When to measure kick counts:   Measure kick counts at the same time every day.       Measure kick counts when your baby is awake and most active. Your baby may be most active in the evening.     How to measure kick counts:  Check that your baby is awake before you measure kick counts. You can wake up your baby by lightly pushing on your belly, walking, or drinking something cold. Your healthcare provider may tell you different ways to measure kick counts. You may be told to do the following:  Use a chart or clock to keep track of the time you start and finish counting.      Sit in a chair or lie on your left side.      Place your hands on the largest part of your belly.      Count until you reach 10 kicks. Write down how much time it takes to count 10 kicks.      It may take 30 minutes to 2 hours to count 10 kicks. It should not take more than 2 hours to count 10 kicks.     Follow up with your doctor as directed:  Write down your questions so you remember to ask them during your visits.   © Copyright [employer] 2023 Information is for End User's use only and may not be sold,  redistributed or otherwise used for commercial purposes.  The above information is an  only. It is not intended as medical advice for individual conditions or treatments. Talk to your doctor, nurse or pharmacist before following any medical regimen to see if it is safe and effective for you.    Quality 130: Documentation Of Current Medications In The Medical Record: Current Medications Documented Quality 131: Pain Assessment And Follow-Up: Pain assessment using a standardized tool is documented as negative, no follow-up plan required Quality 226: Preventive Care And Screening: Tobacco Use: Screening And Cessation Intervention: Patient screened for tobacco use and is an ex/non-smoker Quality 431: Preventive Care And Screening: Unhealthy Alcohol Use - Screening: Patient screened for unhealthy alcohol use using a single question and scores less than 2 times per year Quality 110: Preventive Care And Screening: Influenza Immunization: Influenza immunization was not ordered or administered, reason not given Detail Level: Detailed

## 2025-06-03 NOTE — LETTER
Carley 3, 2025     Dee Dey MD  [address] [address]    Patient: Merline Madden   YOB: 1995   Date of Visit: 6/3/2025       Dear Dr. Dee Dey MD:    Thank you for referring Merline Madden to me for evaluation. Below are my notes for this consultation.    If you have questions, please do not hesitate to call me. I look forward to following your patient along with you.         Sincerely,        Heath Martin MD        CC: No Recipients

## 2025-06-03 NOTE — PROGRESS NOTES
Please refer to the [facility] ultrasound report in Imaging for additional information regarding today's visit

## 2025-06-05 ENCOUNTER — ROUTINE PRENATAL (OUTPATIENT)
Age: 30
End: 2025-06-05
Attending: OBSTETRICS & GYNECOLOGY
Payer: MEDICARE

## 2025-06-05 VITALS
HEART RATE: 84 BPM | DIASTOLIC BLOOD PRESSURE: 56 MMHG | HEIGHT: 63 IN | WEIGHT: 213.2 LBS | BODY MASS INDEX: 37.78 KG/M2 | SYSTOLIC BLOOD PRESSURE: 100 MMHG

## 2025-06-05 DIAGNOSIS — O09.293 HISTORY OF STILLBIRTH IN CURRENTLY PREGNANT PATIENT, THIRD TRIMESTER: ICD-10-CM

## 2025-06-05 DIAGNOSIS — Z3A.37 37 WEEKS GESTATION OF PREGNANCY: Primary | ICD-10-CM

## 2025-06-05 PROCEDURE — 59025 FETAL NON-STRESS TEST: CPT | Performed by: STUDENT IN AN ORGANIZED HEALTH CARE EDUCATION/TRAINING PROGRAM

## 2025-06-06 ENCOUNTER — ROUTINE PRENATAL (OUTPATIENT)
Age: 30
End: 2025-06-06

## 2025-06-06 VITALS
SYSTOLIC BLOOD PRESSURE: 96 MMHG | DIASTOLIC BLOOD PRESSURE: 58 MMHG | HEIGHT: 63 IN | HEART RATE: 88 BPM | WEIGHT: 211.8 LBS | BODY MASS INDEX: 37.53 KG/M2

## 2025-06-06 DIAGNOSIS — N76.0 BV (BACTERIAL VAGINOSIS): ICD-10-CM

## 2025-06-06 DIAGNOSIS — O99.843 PREGNANCY AFFECTED BY PREVIOUS BARIATRIC SURGERY, CURRENTLY IN THIRD TRIMESTER: ICD-10-CM

## 2025-06-06 DIAGNOSIS — O09.899 RUBELLA NON-IMMUNE STATUS, ANTEPARTUM: ICD-10-CM

## 2025-06-06 DIAGNOSIS — O09.293: ICD-10-CM

## 2025-06-06 DIAGNOSIS — B96.89 BV (BACTERIAL VAGINOSIS): ICD-10-CM

## 2025-06-06 DIAGNOSIS — O09.299 HISTORY OF SHOULDER DYSTOCIA IN PRIOR PREGNANCY, CURRENTLY PREGNANT: ICD-10-CM

## 2025-06-06 DIAGNOSIS — Z3A.37 37 WEEKS GESTATION OF PREGNANCY: ICD-10-CM

## 2025-06-06 DIAGNOSIS — O09.93 ENCOUNTER FOR SUPERVISION OF HIGH RISK PREGNANCY IN THIRD TRIMESTER, ANTEPARTUM: Primary | ICD-10-CM

## 2025-06-06 DIAGNOSIS — Z28.39 RUBELLA NON-IMMUNE STATUS, ANTEPARTUM: ICD-10-CM

## 2025-06-06 DIAGNOSIS — O09.293 HISTORY OF STILLBIRTH IN CURRENTLY PREGNANT PATIENT, THIRD TRIMESTER: ICD-10-CM

## 2025-06-06 PROBLEM — Z3A.34 34 WEEKS GESTATION OF PREGNANCY: Status: RESOLVED | Noted: 2025-05-15 | Resolved: 2025-06-06

## 2025-06-06 RX ORDER — METRONIDAZOLE 500 MG/1
500 TABLET ORAL EVERY 12 HOURS SCHEDULED
Qty: 14 TABLET | Refills: 0 | Status: SHIPPED | OUTPATIENT
Start: 2025-06-06 | End: 2025-06-13

## 2025-06-06 NOTE — PROGRESS NOTES
Assessment & Plan  30 y.o.  at 37w4d presenting for routine prenatal visit.     Problem List       Herpes simplex vulvovaginitis    Overview   1st episode 2022 pos for HSV 2.         ASCUS with positive high risk HPV cervical    Overview   In 2019  Pap 2024 NILM/HPV neg         Previous bariatric surgery complicating pregnancy    Overview   Surgery: Sleeve 8/15/23 (in active weight loss phase)/Fetal growth monthly         HSV-2 seropositive    Overview   Valtrex at 36 weeks [ ]  Reports new HSV lesions, will send Valtrex         37 weeks gestation of pregnancy    Overview   Hepatitis B equivocal, rubella non immune  LDASA  Patient desires elective primary  section, schedule pending growth US, patient prefers 38w if possible [ ]  MSAFP ordered  Delivery consent [x]  Tdap [ ] - declines  Elevated 1 hour GTT - checked fingersticks, passed  GBS [ ]- collected 25  Contraception [ ]  1LTCS scheduled for 6/10 at 2 PM; discussed with Dr. Green that 38w delivery is reasonable given previous IUFD         Hepatitis C antibody positive in blood    Overview   + antibody on prenatal labs  F/u quant  Also had +antibody with negative quant in prior pregnancy         Rubella non-immune status, antepartum    Overview   MMR postpartum         Bacterial vaginosis in pregnancy    Anxiety    Obesity, Class I, BMI 30-34.9    Abnormal glucose tolerance test in pregnancy    Overview   Abnormal standard 1 hr 15: 160--> needs to complete 3 hr         History of shoulder dystocia in prior pregnancy, currently pregnant    Herpes simplex virus type 2 (HSV-2) infection affecting pregnancy in third trimester    Overview   Rx sent for valtrex 1 gram daily, advised to start.         Encounter for supervision of high risk pregnancy in third trimester, antepartum    Vaginal spotting    IUGR (intrauterine growth retardation) & stillbirth history, pregnant, third trimester    Back pain    History of stillbirth in currently  "pregnant patient, third trimester    Abdominal pain during pregnancy in third trimester    Vaginal discharge during pregnancy     ____________________________________________________________  Subjective  She reports continued BV symptoms.   She denies contractions, loss of fluid, or vaginal bleeding.   She feels regular fetal movements.     Objective  BP 96/58 (BP Location: Left arm, Cuff Size: Standard)   Pulse 88   Ht 5' 3\" (1.6 m)   Wt 96.1 kg (211 lb 12.8 oz)   LMP 09/09/2024   BMI 37.52 kg/m²   FHR: 130 bpm  Fundal height 37 cm    Physical Exam  Constitutional:       Appearance: She is well-developed.     Cardiovascular:      Rate and Rhythm: Normal rate.   Pulmonary:      Effort: Pulmonary effort is normal. No respiratory distress.   Abdominal:      Palpations: Abdomen is soft.      Tenderness: There is no abdominal tenderness.     Skin:     General: Skin is warm and dry.          Patient's Active Problem List  Problem List[1]        Dee Dey MD  6/6/2025  2:33 PM               [1]   Patient Active Problem List  Diagnosis    Herpes simplex vulvovaginitis    ASCUS with positive high risk HPV cervical    Previous bariatric surgery complicating pregnancy    HSV-2 seropositive    37 weeks gestation of pregnancy    Hepatitis C antibody positive in blood    Rubella non-immune status, antepartum    Bacterial vaginosis in pregnancy    Anxiety    Obesity, Class I, BMI 30-34.9    Abnormal glucose tolerance test in pregnancy    History of shoulder dystocia in prior pregnancy, currently pregnant    Herpes simplex virus type 2 (HSV-2) infection affecting pregnancy in third trimester    Encounter for supervision of high risk pregnancy in third trimester, antepartum    Vaginal spotting    IUGR (intrauterine growth retardation) & stillbirth history, pregnant, third trimester    Back pain    History of stillbirth in currently pregnant patient, third trimester    Abdominal pain during pregnancy in third " trimester    Vaginal discharge during pregnancy

## 2025-06-08 ENCOUNTER — NURSE TRIAGE (OUTPATIENT)
Dept: OTHER | Facility: OTHER | Age: 30
End: 2025-06-08

## 2025-06-08 ENCOUNTER — HOSPITAL ENCOUNTER (OUTPATIENT)
Facility: HOSPITAL | Age: 30
Discharge: HOME | End: 2025-06-08
Attending: OBSTETRICS & GYNECOLOGY | Admitting: OBSTETRICS & GYNECOLOGY
Payer: MEDICARE

## 2025-06-08 VITALS
HEIGHT: 63 IN | TEMPERATURE: 98 F | SYSTOLIC BLOOD PRESSURE: 106 MMHG | WEIGHT: 211.8 LBS | HEART RATE: 84 BPM | BODY MASS INDEX: 37.53 KG/M2 | RESPIRATION RATE: 17 BRPM | DIASTOLIC BLOOD PRESSURE: 55 MMHG

## 2025-06-08 DIAGNOSIS — N39.0 RECURRENT UTI: Primary | ICD-10-CM

## 2025-06-08 PROBLEM — R10.9 ABDOMINAL PAIN DURING PREGNANCY IN THIRD TRIMESTER: Status: RESOLVED | Noted: 2025-05-31 | Resolved: 2025-06-08

## 2025-06-08 PROBLEM — O26.893 ABDOMINAL PAIN DURING PREGNANCY IN THIRD TRIMESTER: Status: RESOLVED | Noted: 2025-05-31 | Resolved: 2025-06-08

## 2025-06-08 PROBLEM — B96.89 BACTERIAL VAGINOSIS IN PREGNANCY: Status: RESOLVED | Noted: 2025-02-04 | Resolved: 2025-06-08

## 2025-06-08 PROBLEM — N93.9 VAGINAL SPOTTING: Status: RESOLVED | Noted: 2025-05-13 | Resolved: 2025-06-08

## 2025-06-08 PROBLEM — O23.599 BACTERIAL VAGINOSIS IN PREGNANCY: Status: RESOLVED | Noted: 2025-02-04 | Resolved: 2025-06-08

## 2025-06-08 PROBLEM — O26.899 VAGINAL DISCHARGE DURING PREGNANCY: Status: RESOLVED | Noted: 2025-06-02 | Resolved: 2025-06-08

## 2025-06-08 PROBLEM — N89.8 VAGINAL DISCHARGE DURING PREGNANCY: Status: RESOLVED | Noted: 2025-06-02 | Resolved: 2025-06-08

## 2025-06-08 LAB
ABO GROUP BLD: NORMAL
BACTERIA UR QL AUTO: ABNORMAL /HPF
BILIRUB UR QL STRIP: NEGATIVE
BLD GP AB SCN SERPL QL: NEGATIVE
CLARITY UR: CLEAR
COLOR UR: ABNORMAL
ERYTHROCYTE [DISTWIDTH] IN BLOOD BY AUTOMATED COUNT: 13.9 % (ref 11.6–15.1)
GLUCOSE UR STRIP-MCNC: NEGATIVE MG/DL
HCT VFR BLD AUTO: 33.4 % (ref 34.8–46.1)
HGB BLD-MCNC: 10.5 G/DL (ref 11.5–15.4)
HGB UR QL STRIP.AUTO: ABNORMAL
KETONES UR STRIP-MCNC: NEGATIVE MG/DL
LEUKOCYTE ESTERASE UR QL STRIP: ABNORMAL
MCH RBC QN AUTO: 24.5 PG (ref 26.8–34.3)
MCHC RBC AUTO-ENTMCNC: 31.4 G/DL (ref 31.4–37.4)
MCV RBC AUTO: 78 FL (ref 82–98)
MUCOUS THREADS UR QL AUTO: ABNORMAL
NITRITE UR QL STRIP: NEGATIVE
NON-SQ EPI CELLS URNS QL MICRO: ABNORMAL /HPF
PH UR STRIP.AUTO: 6.5 [PH]
PLATELET # BLD AUTO: 290 THOUSANDS/UL (ref 149–390)
PMV BLD AUTO: 10.6 FL (ref 8.9–12.7)
PROT UR STRIP-MCNC: NEGATIVE MG/DL
RBC # BLD AUTO: 4.29 MILLION/UL (ref 3.81–5.12)
RBC #/AREA URNS AUTO: ABNORMAL /HPF
RH BLD: POSITIVE
SP GR UR STRIP.AUTO: 1.02 (ref 1–1.03)
SPECIMEN EXPIRATION DATE: NORMAL
UROBILINOGEN UR STRIP-ACNC: <2 MG/DL
WBC # BLD AUTO: 9.18 THOUSAND/UL (ref 4.31–10.16)
WBC #/AREA URNS AUTO: ABNORMAL /HPF

## 2025-06-08 PROCEDURE — 87077 CULTURE AEROBIC IDENTIFY: CPT | Performed by: OBSTETRICS & GYNECOLOGY

## 2025-06-08 PROCEDURE — 87186 SC STD MICRODIL/AGAR DIL: CPT | Performed by: OBSTETRICS & GYNECOLOGY

## 2025-06-08 PROCEDURE — 86850 RBC ANTIBODY SCREEN: CPT

## 2025-06-08 PROCEDURE — 81001 URINALYSIS AUTO W/SCOPE: CPT | Performed by: OBSTETRICS & GYNECOLOGY

## 2025-06-08 PROCEDURE — 86900 BLOOD TYPING SEROLOGIC ABO: CPT

## 2025-06-08 PROCEDURE — 86780 TREPONEMA PALLIDUM: CPT

## 2025-06-08 PROCEDURE — 87086 URINE CULTURE/COLONY COUNT: CPT | Performed by: OBSTETRICS & GYNECOLOGY

## 2025-06-08 PROCEDURE — 99214 OFFICE O/P EST MOD 30 MIN: CPT | Performed by: OBSTETRICS & GYNECOLOGY

## 2025-06-08 PROCEDURE — 86901 BLOOD TYPING SEROLOGIC RH(D): CPT

## 2025-06-08 PROCEDURE — 85027 COMPLETE CBC AUTOMATED: CPT | Performed by: OBSTETRICS & GYNECOLOGY

## 2025-06-08 PROCEDURE — 99213 OFFICE O/P EST LOW 20 MIN: CPT

## 2025-06-08 RX ORDER — CEFPODOXIME PROXETIL 100 MG/1
100 TABLET, FILM COATED ORAL 2 TIMES DAILY
Qty: 14 TABLET | Refills: 0 | Status: SHIPPED | OUTPATIENT
Start: 2025-06-08 | End: 2025-06-15

## 2025-06-08 RX ADMIN — CEFTRIAXONE 1000 MG: 10 INJECTION, POWDER, FOR SOLUTION INTRAVENOUS at 13:15

## 2025-06-08 NOTE — PROGRESS NOTES
L&D Triage Note - OB/GYN  Merline Madden 30 y.o. female MRN: [id number]  Unit/Bed#:  311-01 Encounter: [id number]      ASSESSMENT:    Merline Madden is a 30 y.o.  at 37w6d who presents with left lower back pain. She has had a Proteus UTI that has not been fully treated in this pregnancy.    PLAN:    1) Back pain/UTI   -Has had Proteus UTI throughout pregnancy   -Last treatment she was resistant too   -No signs of ascending infection outside of back pain that has been documented since May and kidney ultrasound did not shows signs of infection or stones   -WBC of 9k, UA shows moderate leuks   -Plan for ceftriaxone followed by Cefpodoxime with transition to Bactrim after delivery.    2) Continue routine prenatal care  3) Discharge from OB triage with term labor precautions    - Reviewed rupture of membranes, false vs true labor, decreased fetal movement, and vaginal bleeding   - Pt to call provider with any concerns and follow up at her next scheduled prenatal appointment        SUBJECTIVE:    Merline Madden 30 y.o.  at 37w6d with an Estimated Date of Delivery: 25  who presents with left lower back pain. She thinks its related to a UTI as she has had it before when she had a UTI. She denies urinary symptoms including frequency, urgency, dysuria or odor. She has had a Proteus urinary  tract infection since December. Every time her urine culture has been checked in pregnancy it had not been cleared. She was last here in May and treated with IV ceftriaxone and sent home on Macrobid, however the Proteus is resistant to macrobid. She denies fevers, chills, N/V, chest pain or SOB. She is scheduled for 1LTCS on 6/10/25.    Contractions: none  Leakage of fluid: none  Vaginal Bleeding: none  Fetal movement: present    OBJECTIVE:    Vitals:    25 1006   BP: 106/55   Pulse: 84   Resp: 17   Temp: 98 °F (36.7 °C)       ROS:  Constitutional: Negative  Respiratory:  Negative  Cardiovascular: Negative    Gastrointestinal: Negative    General Physical Exam:  General: in no apparent distress  Cardiovascular: Cor RRR and No murmurs  Lungs: non-labored breathing  Abdomen: abdomen is soft without significant tenderness, masses, organomegaly or guarding, mild left CVA tenderness  Lower extremeties: nontender    Cervical Exam  Speculum: not performed  SVE: 1 / 50% / -3    Fetal monitoring:  FHT:  140 bpm/ Moderate 6 - 25 bpm / 15 x 15 accelerations present, no decelerations  [place]: no contractions          Terrance Stringer MD,  6/8/2025 12:41 PM

## 2025-06-08 NOTE — TELEPHONE ENCOUNTER
"REASON FOR CONVERSATION: Pregnancy Problem and Possible UTI    SYMPTOMS: Pt reports left lower back pain started today and rates it 7/10. Pt normally has left lower back pain whenever she has a UTI.  Pt denies fever, urinary burning and frequency, nausea, and vomiting.     OTHER HEALTH INFORMATION: Pt is 37 weeks, 6 days (). Pt is scheduled for  on 6/10/25. Pt reports fetal movement is normal.     PROTOCOL DISPOSITION: Go to LD Now (overriding Go to ED Now (or PCP Triage))    CARE ADVICE PROVIDED: On call provider contacted (Dr Stringer). Per Dr. Stringer, pt should seek evaluation at [facility] L&D     PRACTICE FOLLOW-UP: None at this time.      Reason for Disposition   Side (flank) or lower back pain present    Answer Assessment - Initial Assessment Questions  1. SEVERITY: \"How bad is the pain?\"        7/10 left lower back pain (kidney pain)    2. FREQUENCY: \"How many times have you had painful urination today?\"         No frequency. Only lower left back pain that started today.     3. PATTERN: \"Is pain present every time you urinate or just sometimes?\"         No pain with urination.     4. ONSET: \"When did the painful urination start?\"         Today     5. FEVER: \"Do you have a fever?\" If Yes, ask: \"What is your temperature, how was it measured, and when did it start?\"        No fever    6. CAUSE: \"What do you think is causing the painful urination?\"         UTI symptoms    7. OTHER SYMPTOMS: \"Do you have any other symptoms?\" (e.g., flank or back pain, contractions, blood in urine)        Left lower back pain    8. FAHEEM: \"What date are you expecting to deliver?\"       Tuesday 06/10/25     9. PREGNANCY: \"How many weeks pregnant are you?\"      37 weeks,     OTHER INFORMATION  Fetal movement normal    No symptoms of nausea, vomiting, and diarrhea.    Protocols used: Pregnancy - Urination Pain-Adult-AH    "

## 2025-06-09 LAB — TREPONEMA PALLIDUM IGG+IGM AB [PRESENCE] IN SERUM OR PLASMA BY IMMUNOASSAY: NORMAL

## 2025-06-10 ENCOUNTER — CLINICAL DOCUMENTATION ONLY (OUTPATIENT)
Dept: NURSERY | Facility: HOSPITAL | Age: 30
End: 2025-06-10

## 2025-06-10 ENCOUNTER — HOSPITAL ENCOUNTER (INPATIENT)
Facility: HOSPITAL | Age: 30
LOS: 2 days | Discharge: HOME | End: 2025-06-12
Attending: OBSTETRICS & GYNECOLOGY | Admitting: OBSTETRICS & GYNECOLOGY
Payer: MEDICARE

## 2025-06-10 ENCOUNTER — ANESTHESIA EVENT (INPATIENT)
Dept: LABOR AND DELIVERY | Facility: HOSPITAL | Age: 30
End: 2025-06-10
Payer: MEDICARE

## 2025-06-10 ENCOUNTER — ANESTHESIA (INPATIENT)
Dept: LABOR AND DELIVERY | Facility: HOSPITAL | Age: 30
End: 2025-06-10
Payer: MEDICARE

## 2025-06-10 DIAGNOSIS — Z98.891 S/P CESAREAN SECTION: Primary | ICD-10-CM

## 2025-06-10 PROBLEM — Z98.84 H/O BARIATRIC SURGERY: Status: ACTIVE | Noted: 2025-06-10

## 2025-06-10 PROBLEM — Z3A.38 38 WEEKS GESTATION OF PREGNANCY: Status: ACTIVE | Noted: 2024-12-04

## 2025-06-10 LAB
ABO GROUP BLD: NORMAL
BACTERIA UR CULT: ABNORMAL
BACTERIA UR CULT: ABNORMAL
BASE EXCESS BLDCOA CALC-SCNC: -7.6 MMOL/L (ref 3–11)
BASE EXCESS BLDCOV CALC-SCNC: -5.3 MMOL/L (ref 1–9)
BLD GP AB SCN SERPL QL: NEGATIVE
ERYTHROCYTE [DISTWIDTH] IN BLOOD BY AUTOMATED COUNT: 14 % (ref 11.6–15.1)
GLUCOSE SERPL-MCNC: 70 MG/DL (ref 65–140)
HCO3 BLDCOA-SCNC: 22 MMOL/L (ref 17.3–27.3)
HCO3 BLDCOV-SCNC: 22.3 MMOL/L (ref 12.2–28.6)
HCT VFR BLD AUTO: 32.9 % (ref 34.8–46.1)
HGB BLD-MCNC: 10.6 G/DL (ref 11.5–15.4)
HOLD SPECIMEN: YES
MCH RBC QN AUTO: 24.8 PG (ref 26.8–34.3)
MCHC RBC AUTO-ENTMCNC: 32.2 G/DL (ref 31.4–37.4)
MCV RBC AUTO: 77 FL (ref 82–98)
O2 CT VFR BLDCOA CALC: 11.1 ML/DL
OXYHGB MFR BLDCOA: 46.5 %
OXYHGB MFR BLDCOV: 40 %
PCO2 BLDCOA: 60.7 MM[HG] (ref 30–60)
PCO2 BLDCOV: 50.6 MM HG (ref 27–43)
PH BLDCOA: 7.18 [PH] (ref 7.23–7.43)
PH BLDCOV: 7.26 [PH] (ref 7.19–7.49)
PLATELET # BLD AUTO: 288 THOUSANDS/UL (ref 149–390)
PMV BLD AUTO: 10.7 FL (ref 8.9–12.7)
PO2 BLDCOA: 24.2 MM HG (ref 5–25)
PO2 BLDCOV: 19.8 MM HG (ref 15–45)
RBC # BLD AUTO: 4.27 MILLION/UL (ref 3.81–5.12)
RH BLD: POSITIVE
SAO2 % BLDCOV: 9.5 ML/DL
SPECIMEN EXPIRATION DATE: NORMAL
WBC # BLD AUTO: 8.95 THOUSAND/UL (ref 4.31–10.16)

## 2025-06-10 PROCEDURE — 82805 BLOOD GASES W/O2 SATURATION: CPT | Performed by: OBSTETRICS & GYNECOLOGY

## 2025-06-10 PROCEDURE — 86780 TREPONEMA PALLIDUM: CPT

## 2025-06-10 PROCEDURE — 86901 BLOOD TYPING SEROLOGIC RH(D): CPT

## 2025-06-10 PROCEDURE — 85027 COMPLETE CBC AUTOMATED: CPT

## 2025-06-10 PROCEDURE — 82948 REAGENT STRIP/BLOOD GLUCOSE: CPT

## 2025-06-10 PROCEDURE — 59514 CESAREAN DELIVERY ONLY: CPT | Performed by: OBSTETRICS & GYNECOLOGY

## 2025-06-10 PROCEDURE — NC001 PR NO CHARGE: Performed by: OBSTETRICS & GYNECOLOGY

## 2025-06-10 PROCEDURE — 4A1HXCZ MONITORING OF PRODUCTS OF CONCEPTION, CARDIAC RATE, EXTERNAL APPROACH: ICD-10-PCS | Performed by: OBSTETRICS & GYNECOLOGY

## 2025-06-10 PROCEDURE — 86850 RBC ANTIBODY SCREEN: CPT

## 2025-06-10 PROCEDURE — 86900 BLOOD TYPING SEROLOGIC ABO: CPT

## 2025-06-10 RX ORDER — CEFPODOXIME PROXETIL 100 MG/1
100 TABLET, FILM COATED ORAL 2 TIMES DAILY
Status: DISCONTINUED | OUTPATIENT
Start: 2025-06-10 | End: 2025-06-10

## 2025-06-10 RX ORDER — MORPHINE SULFATE 0.5 MG/ML
INJECTION, SOLUTION EPIDURAL; INTRATHECAL; INTRAVENOUS
Status: COMPLETED
Start: 2025-06-10 | End: 2025-06-10

## 2025-06-10 RX ORDER — CEFAZOLIN SODIUM 2 G/50ML
2000 SOLUTION INTRAVENOUS ONCE
Status: COMPLETED | OUTPATIENT
Start: 2025-06-10 | End: 2025-06-10

## 2025-06-10 RX ORDER — OXYTOCIN/0.9 % SODIUM CHLORIDE 30/500 ML
PLASTIC BAG, INJECTION (ML) INTRAVENOUS CONTINUOUS PRN
Status: DISCONTINUED | OUTPATIENT
Start: 2025-06-10 | End: 2025-06-10

## 2025-06-10 RX ORDER — ACETAMINOPHEN 10 MG/ML
1000 INJECTION, SOLUTION INTRAVENOUS EVERY 6 HOURS SCHEDULED
Status: DISCONTINUED | OUTPATIENT
Start: 2025-06-10 | End: 2025-06-12

## 2025-06-10 RX ORDER — ALBUTEROL SULFATE 90 UG/1
2 INHALANT RESPIRATORY (INHALATION) EVERY 4 HOURS PRN
Status: DISCONTINUED | OUTPATIENT
Start: 2025-06-10 | End: 2025-06-10

## 2025-06-10 RX ORDER — ENOXAPARIN SODIUM 100 MG/ML
40 INJECTION SUBCUTANEOUS DAILY
Status: DISCONTINUED | OUTPATIENT
Start: 2025-06-11 | End: 2025-06-12 | Stop reason: HOSPADM

## 2025-06-10 RX ORDER — POLYETHYLENE GLYCOL 3350 17 G/17G
17 POWDER, FOR SOLUTION ORAL DAILY PRN
Status: DISCONTINUED | OUTPATIENT
Start: 2025-06-10 | End: 2025-06-12 | Stop reason: HOSPADM

## 2025-06-10 RX ORDER — SODIUM CHLORIDE, SODIUM LACTATE, POTASSIUM CHLORIDE, CALCIUM CHLORIDE 600; 310; 30; 20 MG/100ML; MG/100ML; MG/100ML; MG/100ML
125 INJECTION, SOLUTION INTRAVENOUS CONTINUOUS
Status: DISCONTINUED | OUTPATIENT
Start: 2025-06-10 | End: 2025-06-10

## 2025-06-10 RX ORDER — DIPHENHYDRAMINE HYDROCHLORIDE 50 MG/ML
25 INJECTION, SOLUTION INTRAMUSCULAR; INTRAVENOUS EVERY 6 HOURS PRN
Status: DISCONTINUED | OUTPATIENT
Start: 2025-06-11 | End: 2025-06-12 | Stop reason: HOSPADM

## 2025-06-10 RX ORDER — DOCUSATE SODIUM 100 MG/1
100 CAPSULE, LIQUID FILLED ORAL 2 TIMES DAILY
Status: DISCONTINUED | OUTPATIENT
Start: 2025-06-10 | End: 2025-06-12 | Stop reason: HOSPADM

## 2025-06-10 RX ORDER — ONDANSETRON 2 MG/ML
INJECTION INTRAMUSCULAR; INTRAVENOUS AS NEEDED
Status: DISCONTINUED | OUTPATIENT
Start: 2025-06-10 | End: 2025-06-10

## 2025-06-10 RX ORDER — FENTANYL CITRATE 50 UG/ML
INJECTION, SOLUTION INTRAMUSCULAR; INTRAVENOUS AS NEEDED
Status: DISCONTINUED | OUTPATIENT
Start: 2025-06-10 | End: 2025-06-10

## 2025-06-10 RX ORDER — OXYCODONE HYDROCHLORIDE 5 MG/1
5 TABLET ORAL EVERY 4 HOURS PRN
Status: ACTIVE | OUTPATIENT
Start: 2025-06-10 | End: 2025-06-11

## 2025-06-10 RX ORDER — PHENYLEPHRINE HCL IN 0.9% NACL 1 MG/10 ML
SYRINGE (ML) INTRAVENOUS AS NEEDED
Status: DISCONTINUED | OUTPATIENT
Start: 2025-06-10 | End: 2025-06-10

## 2025-06-10 RX ORDER — PROMETHAZINE HYDROCHLORIDE 25 MG/ML
6.25 INJECTION, SOLUTION INTRAMUSCULAR; INTRAVENOUS ONCE AS NEEDED
Status: DISCONTINUED | OUTPATIENT
Start: 2025-06-10 | End: 2025-06-11 | Stop reason: SDUPTHER

## 2025-06-10 RX ORDER — OXYTOCIN/0.9 % SODIUM CHLORIDE 30/500 ML
PLASTIC BAG, INJECTION (ML) INTRAVENOUS
Status: COMPLETED
Start: 2025-06-10 | End: 2025-06-10

## 2025-06-10 RX ORDER — ONDANSETRON 2 MG/ML
INJECTION INTRAMUSCULAR; INTRAVENOUS
Status: COMPLETED
Start: 2025-06-10 | End: 2025-06-10

## 2025-06-10 RX ORDER — KETOROLAC TROMETHAMINE 30 MG/ML
15 INJECTION, SOLUTION INTRAMUSCULAR; INTRAVENOUS EVERY 6 HOURS SCHEDULED
Status: DISCONTINUED | OUTPATIENT
Start: 2025-06-10 | End: 2025-06-10

## 2025-06-10 RX ORDER — METOCLOPRAMIDE HYDROCHLORIDE 5 MG/ML
5 INJECTION INTRAMUSCULAR; INTRAVENOUS EVERY 6 HOURS PRN
Status: ACTIVE | OUTPATIENT
Start: 2025-06-10 | End: 2025-06-11

## 2025-06-10 RX ORDER — DIPHENHYDRAMINE HYDROCHLORIDE 50 MG/ML
25 INJECTION, SOLUTION INTRAMUSCULAR; INTRAVENOUS EVERY 6 HOURS PRN
Status: ACTIVE | OUTPATIENT
Start: 2025-06-10 | End: 2025-06-11

## 2025-06-10 RX ORDER — NALBUPHINE HYDROCHLORIDE 10 MG/ML
3 INJECTION INTRAMUSCULAR; INTRAVENOUS; SUBCUTANEOUS
Status: ACTIVE | OUTPATIENT
Start: 2025-06-10 | End: 2025-06-11

## 2025-06-10 RX ORDER — BUPIVACAINE HYDROCHLORIDE 7.5 MG/ML
INJECTION, SOLUTION INTRASPINAL AS NEEDED
Status: DISCONTINUED | OUTPATIENT
Start: 2025-06-10 | End: 2025-06-10

## 2025-06-10 RX ORDER — FENTANYL CITRATE 50 UG/ML
INJECTION, SOLUTION INTRAMUSCULAR; INTRAVENOUS
Status: COMPLETED
Start: 2025-06-10 | End: 2025-06-10

## 2025-06-10 RX ORDER — IBUPROFEN 600 MG/1
600 TABLET, FILM COATED ORAL EVERY 6 HOURS
Status: DISCONTINUED | OUTPATIENT
Start: 2025-06-12 | End: 2025-06-10

## 2025-06-10 RX ORDER — SODIUM CHLORIDE, SODIUM LACTATE, POTASSIUM CHLORIDE, CALCIUM CHLORIDE 600; 310; 30; 20 MG/100ML; MG/100ML; MG/100ML; MG/100ML
125 INJECTION, SOLUTION INTRAVENOUS CONTINUOUS
Status: DISCONTINUED | OUTPATIENT
Start: 2025-06-10 | End: 2025-06-12 | Stop reason: HOSPADM

## 2025-06-10 RX ORDER — FENTANYL CITRATE/PF 50 MCG/ML
50 SYRINGE (ML) INJECTION
Status: DISCONTINUED | OUTPATIENT
Start: 2025-06-10 | End: 2025-06-11 | Stop reason: SDUPTHER

## 2025-06-10 RX ORDER — NALOXONE HYDROCHLORIDE 0.4 MG/ML
0.1 INJECTION, SOLUTION INTRAMUSCULAR; INTRAVENOUS; SUBCUTANEOUS
Status: ACTIVE | OUTPATIENT
Start: 2025-06-10 | End: 2025-06-11

## 2025-06-10 RX ORDER — PHENYLEPHRINE HCL IN 0.9% NACL 1 MG/10 ML
SYRINGE (ML) INTRAVENOUS
Status: COMPLETED
Start: 2025-06-10 | End: 2025-06-10

## 2025-06-10 RX ORDER — METRONIDAZOLE 500 MG/1
500 TABLET ORAL EVERY 12 HOURS SCHEDULED
Status: DISCONTINUED | OUTPATIENT
Start: 2025-06-10 | End: 2025-06-10

## 2025-06-10 RX ORDER — HYDROMORPHONE HCL/PF 1 MG/ML
0.5 SYRINGE (ML) INJECTION
Status: DISCONTINUED | OUTPATIENT
Start: 2025-06-10 | End: 2025-06-11 | Stop reason: SDUPTHER

## 2025-06-10 RX ORDER — HYDROMORPHONE HCL/PF 1 MG/ML
0.5 SYRINGE (ML) INJECTION EVERY 2 HOUR PRN
Status: ACTIVE | OUTPATIENT
Start: 2025-06-10 | End: 2025-06-11

## 2025-06-10 RX ORDER — MORPHINE SULFATE 0.5 MG/ML
INJECTION, SOLUTION EPIDURAL; INTRATHECAL; INTRAVENOUS AS NEEDED
Status: DISCONTINUED | OUTPATIENT
Start: 2025-06-10 | End: 2025-06-10

## 2025-06-10 RX ORDER — CALCIUM CARBONATE 500 MG/1
1000 TABLET, CHEWABLE ORAL DAILY PRN
Status: DISCONTINUED | OUTPATIENT
Start: 2025-06-10 | End: 2025-06-12 | Stop reason: HOSPADM

## 2025-06-10 RX ORDER — OXYTOCIN/0.9 % SODIUM CHLORIDE 30/500 ML
62.5 PLASTIC BAG, INJECTION (ML) INTRAVENOUS CONTINUOUS
Status: ACTIVE | OUTPATIENT
Start: 2025-06-10 | End: 2025-06-11

## 2025-06-10 RX ORDER — AMOXICILLIN 875 MG/1
875 TABLET, COATED ORAL EVERY 12 HOURS SCHEDULED
Status: DISCONTINUED | OUTPATIENT
Start: 2025-06-10 | End: 2025-06-10

## 2025-06-10 RX ORDER — OXYCODONE HYDROCHLORIDE 5 MG/1
10 TABLET ORAL EVERY 4 HOURS PRN
Status: DISCONTINUED | OUTPATIENT
Start: 2025-06-11 | End: 2025-06-12 | Stop reason: HOSPADM

## 2025-06-10 RX ORDER — ONDANSETRON 2 MG/ML
4 INJECTION INTRAMUSCULAR; INTRAVENOUS ONCE AS NEEDED
Status: COMPLETED | OUTPATIENT
Start: 2025-06-10 | End: 2025-06-10

## 2025-06-10 RX ORDER — ONDANSETRON 2 MG/ML
4 INJECTION INTRAMUSCULAR; INTRAVENOUS EVERY 6 HOURS PRN
Status: ACTIVE | OUTPATIENT
Start: 2025-06-10 | End: 2025-06-11

## 2025-06-10 RX ORDER — PHENYLEPHRINE HCL IN 0.9% NACL 1 MG/10 ML
SYRINGE (ML) INTRAVENOUS
Status: DISPENSED
Start: 2025-06-10 | End: 2025-06-11

## 2025-06-10 RX ORDER — OXYCODONE HYDROCHLORIDE 5 MG/1
5 TABLET ORAL EVERY 4 HOURS PRN
Status: DISCONTINUED | OUTPATIENT
Start: 2025-06-11 | End: 2025-06-12 | Stop reason: HOSPADM

## 2025-06-10 RX ORDER — ONDANSETRON 2 MG/ML
4 INJECTION INTRAMUSCULAR; INTRAVENOUS EVERY 8 HOURS PRN
Status: DISCONTINUED | OUTPATIENT
Start: 2025-06-10 | End: 2025-06-12 | Stop reason: HOSPADM

## 2025-06-10 RX ORDER — ACETAMINOPHEN 325 MG/1
650 TABLET ORAL EVERY 6 HOURS SCHEDULED
Status: DISCONTINUED | OUTPATIENT
Start: 2025-06-10 | End: 2025-06-10

## 2025-06-10 RX ORDER — AMOXICILLIN 500 MG/1
500 CAPSULE ORAL EVERY 8 HOURS SCHEDULED
Status: DISCONTINUED | OUTPATIENT
Start: 2025-06-10 | End: 2025-06-12 | Stop reason: HOSPADM

## 2025-06-10 RX ADMIN — AMOXICILLIN 500 MG: 500 CAPSULE ORAL at 18:07

## 2025-06-10 RX ADMIN — Medication 62.5 MILLI-UNITS/MIN: at 16:39

## 2025-06-10 RX ADMIN — ACETAMINOPHEN 1000 MG: 1000 INJECTION, SOLUTION INTRAVENOUS at 18:07

## 2025-06-10 RX ADMIN — BUPIVACAINE HYDROCHLORIDE IN DEXTROSE 1.6 ML: 7.5 INJECTION, SOLUTION SUBARACHNOID at 14:31

## 2025-06-10 RX ADMIN — ONDANSETRON 4 MG: 2 INJECTION INTRAMUSCULAR; INTRAVENOUS at 18:48

## 2025-06-10 RX ADMIN — Medication 200 MCG: at 14:32

## 2025-06-10 RX ADMIN — Medication 400 MCG: at 14:36

## 2025-06-10 RX ADMIN — DOCUSATE SODIUM 100 MG: 100 CAPSULE, LIQUID FILLED ORAL at 18:07

## 2025-06-10 RX ADMIN — PHENYLEPHRINE HYDROCHLORIDE 30 MCG/MIN: 10 INJECTION INTRAVENOUS at 14:32

## 2025-06-10 RX ADMIN — SODIUM CHLORIDE, SODIUM LACTATE, POTASSIUM CHLORIDE, AND CALCIUM CHLORIDE 125 ML/HR: .6; .31; .03; .02 INJECTION, SOLUTION INTRAVENOUS at 15:54

## 2025-06-10 RX ADMIN — FENTANYL CITRATE 15 MCG: 50 INJECTION INTRAMUSCULAR; INTRAVENOUS at 14:31

## 2025-06-10 RX ADMIN — ONDANSETRON 4 MG: 2 INJECTION INTRAMUSCULAR; INTRAVENOUS at 14:35

## 2025-06-10 RX ADMIN — Medication 200 MCG: at 14:34

## 2025-06-10 RX ADMIN — Medication 200 MCG: at 14:38

## 2025-06-10 RX ADMIN — Medication 100 MCG: at 14:40

## 2025-06-10 RX ADMIN — MORPHINE SULFATE 0.15 MG: 0.5 INJECTION, SOLUTION EPIDURAL; INTRATHECAL; INTRAVENOUS at 14:31

## 2025-06-10 RX ADMIN — ONDANSETRON 4 MG: 2 INJECTION INTRAMUSCULAR; INTRAVENOUS at 18:47

## 2025-06-10 RX ADMIN — SODIUM CHLORIDE, SODIUM LACTATE, POTASSIUM CHLORIDE, AND CALCIUM CHLORIDE 1000 ML: .6; .31; .03; .02 INJECTION, SOLUTION INTRAVENOUS at 12:28

## 2025-06-10 RX ADMIN — SODIUM CHLORIDE, SODIUM LACTATE, POTASSIUM CHLORIDE, AND CALCIUM CHLORIDE 125 ML/HR: .6; .31; .03; .02 INJECTION, SOLUTION INTRAVENOUS at 13:44

## 2025-06-10 RX ADMIN — CEFAZOLIN SODIUM 2000 MG: 2 SOLUTION INTRAVENOUS at 14:36

## 2025-06-10 RX ADMIN — Medication 100 MCG: at 14:37

## 2025-06-10 RX ADMIN — Medication 200 MCG: at 14:42

## 2025-06-10 RX ADMIN — Medication 1000 MILLI-UNITS/MIN: at 14:49

## 2025-06-10 NOTE — OP NOTE
OPERATIVE REPORT  PATIENT NAME: Merline Webb    :  1995  MRN: [id number]  Pt Location: [facility] L&D OR ROOM 01    SURGERY DATE: 6/10/2025    Surgeons and Role:     * Dee Dey MD - Primary     * Martha Gomez MD - Assisting    Preop Diagnosis:  Pregnancy 38w1d  History of IUFD  History of bariatric surgery  History of shoulder dystocia  Recurrent UTI  Rubella non-immune    Procedure(s) (LRB):   SECTION () (N/A)    Specimen(s):  ID Type Source Tests Collected by Time Destination   A :  Cord Blood Cord BLOOD GAS, VENOUS, CORD Dee Dey MD 6/10/2025 1449    B :  Cord Blood Cord BLOOD GAS, ARTERIAL, CORD Dee Dey MD 6/10/2025 1449    C :  Tissue (Placenta on Hold) OB Only Placenta PLACENTA IN STORAGE Dee Dey MD 6/10/2025 1450        Surgical QBL:  Surgical QBL (mL): 189 mL      Anesthesia Type:   Spinal    Operative Indications:  History of prior IUFD  Maternal request     [employer] Classification System:  No Multiple pregnancy, No Transverse or oblique lie, No Breech lie, Gestational age is > or =37 weeks, Multiparous, No Previous uterine scar, Prelabor CD is JACKSON 4b    Complications:   None    Procedure and Technique:    Operative Findings:  1. Viable female  at 1447 with APGARs of 8 and 9 at 1 and 5 minutes. Fetus weighted 6lb 1.4oz (2760g).  2. Normal intact placenta with centrally inserted 3VC expressed at 1450.   3. Normal uterus, bilateral tubes and ovaries.  4. Blood gases:   Arterial pH: 7.177   Arterial base excess: -7.6   Venous pH: 7.262   Venous base excess: -5.3    The patient was taken to the operating room. Spinal anesthesia was adequately established and Ancef was given for preoperative prophylaxis.  The patient was then placed in the dorsal supine position with a left tilt of the hips. The patient was then prepped with chlorhexidine for vaginal prep and chloraprep for abdominal prep and draped in the usual sterile fashion  for a Pfannenstiel skin incision.      A time out was performed to confirm correct patient and correct procedure. An incision was made in the skin with a surgical scalpel and sharp dissection was carried out over subsequent layers of tissue including the fascia, followed by the Bovie electrocautery for hemostasis.  The fascia was incised at the midline and the fascial incision was extended bilaterally in a blunt fashion. Blunt dissection was used to reach the level of the rectus muscle. The rectus muscles were then divided at midline and the peritoneum was identified, tented up at its upper margin taking care to avoid the bladder, and entered bluntly. The Zane retractor was inserted and a transverse incision was made in the lower uterine segment using a new surgical blade.  The uterine incision was extended cephalad and caudal using blunt dissection.  The amniotic sac was entered and the amniotic fluid was noted to be clear .    The surgeon's hand was placed into the uterine cavity. The fetal head was identified and elevated through the uterine incision with the assistance of fundal pressure. With gentle traction, the shoulder was delivered, followed by the rest of the fetal body. There was no nuchal cord noted. On delivery the cord was doubly clamped and cut after delayed cord clamping.  The infant was then passed off the table to the awaiting  staff. The  was noted to cry spontaneously and moved all extremities. Venous and arterial blood gas, cord blood, and portion of cord was obtained for analysis and routine blood testing.  The placenta delivery was then sent to storage. Placenta was noted to be intact with a centrally inserted three-vessel cord.  Oxytocin was administered by IV infusion to enhance uterine contraction.  The uterus was cleared of all clots and remaining products of conception.      The uterine incision was re approximated using a 0 Vicryl in a running locked fashion.  A second  horizontal imbricating stitch with 0 Monocryl was applied.  The uterine incision was examined and noted to be hemostatic.   The pericolic gutters were cleared of all clots.  The uterine incision was once again reexamined and noted to be hemostatic.  The fascia was re approximated using 0 Vicryl in a running nonlocked fashion. The subcutaneous tissue was irrigated and cleared of all clots and debris.  Good hemostasis was noted with Bovie electrocautery. The subcutaneous  tissue was reapproximated with running plain suture.  The skin incision was closed using 4-0 Monocryl with Steri Strips placed over top.  Good hemostasis was noted.  Patient tolerated the procedure well.  All needle, sponge, and instrument counts were noted to be correct x 2 at the end of the procedure.  Patient was transferred to the recovery room in stable condition.     I was present for the entire procedure.    Patient Disposition:  PACU         SIGNATURE: Dee Dey MD  DATE: Carley 10, 2025  TIME: 3:31 PM

## 2025-06-10 NOTE — ANESTHESIA PREPROCEDURE EVALUATION
Procedure:   SECTION () (Uterus)    Relevant Problems   GI/HEPATIC   (+) H/O bariatric surgery      GYN   (+) 37 weeks gestation of pregnancy   (+) Encounter for supervision of high risk pregnancy in third trimester, antepartum      NEURO/PSYCH   (+) Anxiety      Urinary   (+) Recurrent UTI      Other   (+) Hepatitis C antibody positive in blood   (+) Obesity, Class I, BMI 30-34.9        Physical Exam    Airway     Mallampati score: II  TM Distance: >3 FB  Neck ROM: full  Upper bite lip test: I  Mouth opening: >= 4 cm      Cardiovascular  Rhythm: regular, Rate: normalCardiovascular exam normal    Dental   No notable dental hx     Pulmonary  Pulmonary exam normal     Neurological    She appears awake, alert and oriented x3.      Other Findings  post-pubertal.      Anesthesia Plan  ASA Score- 2     Anesthesia Type- spinal with ASA Monitors.         Additional Monitors:     Airway Plan: natural airway.           Plan Factors-    Chart reviewed.   Existing labs reviewed. Patient summary reviewed.    Patient is not a current smoker.      Obstructive sleep apnea risk education given perioperatively.        Induction-     Postoperative Plan- .   Monitoring Plan - Monitoring plan - standard ASA monitoring  Post Operative Pain Plan - multimodal analgesia    Perioperative Resuscitation Plan - Level 1 - Full Code.       Informed Consent- Anesthetic plan and risks discussed with patient and spouse.        NPO Status:  No vitals data found for the desired time range.

## 2025-06-10 NOTE — DISCHARGE SUMMARY
Discharge Summary - OB/GYN   Name: Merline Webb 30 y.o. female I MRN: [id number]  Unit/Bed#: LD OR  I Date of Admission: 6/10/2025   Date of Service: 6/10/2025 I Hospital Day: 0  { ?Quick Links I Problem List I PORCH I Billing Tip:88857}  ADMISSION  Patient of: [facility] (Dr. Castellanos, Dr. Stringer, Dr. Dey)  Admission Date: 6/10/2025   Admitting Attending: Dr. Dee Dey MD  Admitting Diagnoses: Problem List[1]    DELIVERY  Delivery Method: , Low Transverse   Delivery Date and Time: 6/10/2025 2:47 PM  Delivery Attending:  Dr. Dey    DISCHARGE  Discharge Date: ***  Discharge Attending: Dr. Ramos Diagnosis:   Same, Delivered  ***  Clinical course: Admission to Delivery  Merline Webb is a 30 y.o.  who was admitted at 38w1d for 1LTCS in setting of previous IUFD.    Delivery  Route of Delivery: , Low Transverse  Reason for  delivery:   Prior IUFD    Anesthesia: Spinal,   QBL:        QBL:        Delivery: , Low Transverse at 6/10/2025 2:47 PM    Baby's Weight: 2760 g (6 lb 1.4 oz); 97.36    Apgar scores: 8  and 9  at 1 and 5 minutes, respectively    Clinical Course: Post-Delivery:  The post delivery course was {remarkable/unremarkable:79286}.    On the day of discharge, the patient was ambulating, voiding spontaneously, tolerating oral intake, and hemodynamically stable. She was able to reasonably perform all ADLs. She had appropriate bowel function. Pain was well-controlled. She was discharged home on postpartum/postop day #*** without complications***. Patient was instructed to follow up with her OB as an outpatient and was given appropriate warnings to call her provider with problems or concerns.    Pertinent lab findings included:   Blood type {RIAZ BLOOD TYPE:93118}.     Last three Hgb values:  Lab Results   Component Value Date    HGB 10.6 (L) 06/10/2025    HGB 10.5 (L) 2025    HGB 11.2 (L) 05/15/2025         Problem-specific follow-up plans included the following:  {Insert PORCH/Hospital Course prior to signing note:65547}    Discharge med list:  Contraception: ***     Medication List      CONTINUE taking these medications     OneTouch Verio w/Device Kit; Use 4 (four) times a day     ASK your doctor about these medications     acetaminophen 160 mg/5 mL liquid; Commonly known as: TYLENOL   albuterol 90 mcg/act inhaler; Commonly known as: PROVENTIL HFA,VENTOLIN   HFA; Inhale 2 puffs every 4 (four) hours as needed for shortness of breath   or wheezing   cefpodoxime 100 mg tablet; Commonly known as: VANTIN; Take 1 tablet (100   mg total) by mouth 2 (two) times a day for 7 days   metroNIDAZOLE 500 mg tablet; Commonly known as: FLAGYL; Take 1 tablet   (500 mg total) by mouth every 12 (twelve) hours for 7 days   OneTouch Verio test strip; Generic drug: glucose blood; Use as   instructed   polyethylene glycol 17 g packet; Commonly known as: MIRALAX; Take 17 g   by mouth once for 1 dose   Prenatal Adult Gummy/DHA/FA 0.4-25 MG Chew   valACYclovir 1,000 mg tablet; Commonly known as: VALTREX; Take 1 tablet   (1,000 mg total) by mouth daily       Condition at discharge:   {condition:62431}     Disposition:   {Discharge Disposition:22158}    Planned Readmission:   {EXAM;YES/NO:19492}    Discharge Statement:  I have spent a total time of *** minutes in caring for this patient on the day of the visit/encounter. {>30 minutes of time was spent on:05949}.       [1]   Patient Active Problem List  Diagnosis    Herpes simplex vulvovaginitis    ASCUS with positive high risk HPV cervical    Previous bariatric surgery complicating pregnancy    HSV-2 seropositive    38 weeks gestation of pregnancy    Hepatitis C antibody positive in blood    Rubella non-immune status, antepartum    Anxiety    Obesity, Class I, BMI 30-34.9    Abnormal glucose tolerance test in pregnancy    History of shoulder dystocia in prior pregnancy, currently pregnant     Herpes simplex virus type 2 (HSV-2) infection affecting pregnancy in third trimester    Encounter for supervision of high risk pregnancy in third trimester, antepartum    IUGR (intrauterine growth retardation) & stillbirth history, pregnant, third trimester    Recurrent UTI    History of stillbirth in currently pregnant patient, third trimester    H/O bariatric surgery

## 2025-06-10 NOTE — ASSESSMENT & PLAN NOTE
+ antibody on prenatal labs  F/u quant not detected  Also had +antibody with negative quant in prior pregnancy

## 2025-06-10 NOTE — H&P
H & P- Obstetrics   Merline Madden 30 y.o. female MRN: [id number]  Unit/Bed#:  304-01 Encounter: [id number]    Assessment: 30 y.o.  at 38w1d admitted for 1LTCS in the setting of prior IUFD.    Plan:   38 weeks gestation of pregnancy  Assessment & Plan  Admit to OBGYN   NPO  F/u T&S, CBC, RPR   IVF LR 125cc/hr   Continuous fetal monitoring and tocometry   Analgesia by anesthesia  Vertex by TAUS  Proceed to OR for 1LTCS      Recurrent UTI  Assessment & Plan  Recurrent UTIs this pregnancy vs. UTI which hasn't responded to treatment.  Questionable diagnosis of pyelonephritis in May 2025, ultimately deemed to be more consistent with a UTI.     Culture & antibiotic history:  24: Proteus, resistant to Macrobid & Tetracycline, treated with Keflex  24: Proteus, resistant to Macrobid & Tetracycline, treated with Ampicillin  3/24/25: Proteus, resistant to Macrobid & Tetracycline, treated with Keflex  5/15/25: Proteus, resistant to Macrobid & Tetracycline, treated with Ceftriaxone then Macrobid  25: culture pending, discharged on Cefpodoxime with plan for transition to Bactrim postpartum given prior culture-proven sensitivity    History of shoulder dystocia in prior pregnancy, currently pregnant  Assessment & Plan  Fetal demise in utero    Abnormal glucose tolerance test in pregnancy  Assessment & Plan  Abnormal 1 hr  Never completed 3 hr    Rubella non-immune status, antepartum  Assessment & Plan  MMR postpartum    Hepatitis C antibody positive in blood  Assessment & Plan  + antibody on prenatal labs  F/u quant not detected  Also had +antibody with negative quant in prior pregnancy    Previous bariatric surgery complicating pregnancy  Assessment & Plan  Sleeve 8/15/23    ASCUS with positive high risk HPV cervical  Assessment & Plan  In 2019  Pap 2024 NILM/HPV neg    Herpes simplex vulvovaginitis  Assessment & Plan  1st episode  pos for HSV2        Discussed case and plan w/   Tiburcio      Chief Complaint: none    HPI: Merline Madden is a 30 y.o.  with an FAHEEM of 2025, by Ultrasound at 38w1d who is being admitted for 1LTCS. She denies having uterine contractions, has no LOF, and reports no VB. She states she has felt good FM.    Problem List[1]    Baby complications/comments: none    Review of Systems   Constitutional:  Negative for chills and fever.   HENT:  Negative for ear pain and sore throat.    Eyes:  Negative for pain and visual disturbance.   Respiratory:  Negative for cough and shortness of breath.    Cardiovascular:  Negative for chest pain and palpitations.   Gastrointestinal:  Negative for abdominal pain and vomiting.   Genitourinary:  Negative for dysuria and hematuria.   Musculoskeletal:  Negative for arthralgias and back pain.   Skin:  Negative for color change and rash.   Neurological:  Negative for seizures and syncope.   All other systems reviewed and are negative.      OB Hx:  OB History    Para Term  AB Living   2 1 1      SAB IAB Ectopic Multiple Live Births             # Outcome Date GA Lbr Guille/2nd Weight Sex Type Anes PTL Lv   2 Current            1 Term 24 40w1d  2551 g (5 lb 10 oz)  Vag-Spont EPI  FD      Complications: Shoulder Dystocia, Dead fetus in utero, Prolapse of umbilical cord       Past Medical Hx:  Past Medical History[2]    Past Surgical hx:  Past Surgical History[3]    Allergies[4]      Medications Prior to Admission:     acetaminophen (TYLENOL) 160 mg/5 mL liquid    albuterol (PROVENTIL HFA,VENTOLIN HFA) 90 mcg/act inhaler    Blood Glucose Monitoring Suppl (OneTouch Verio) w/Device KIT    cefpodoxime (VANTIN) 100 mg tablet    glucose blood (OneTouch Verio) test strip    metroNIDAZOLE (FLAGYL) 500 mg tablet    polyethylene glycol (MIRALAX) 17 g packet    Prenatal MV & Min w/FA-DHA (Prenatal Adult Gummy/DHA/FA) 0.4-25 MG CHEW    valACYclovir (VALTREX) 1,000 mg tablet    Objective:  Temp:  [98.2 °F (36.8 °C)]  98.2 °F (36.8 °C)  HR:  [105] 105  BP: (112)/(57) 112/57  Resp:  [18] 18  SpO2:  [99 %] 99 %  Body mass index is 37.52 kg/m².     Physical Exam:  Physical Exam  Constitutional:       Appearance: Normal appearance.   HENT:      Head: Atraumatic.     Eyes:      Extraocular Movements: Extraocular movements intact.      Conjunctiva/sclera: Conjunctivae normal.       Cardiovascular:      Rate and Rhythm: Normal rate and regular rhythm.      Pulses: Normal pulses.   Pulmonary:      Effort: Pulmonary effort is normal. No respiratory distress.      Breath sounds: Normal breath sounds.   Abdominal:      Palpations: Abdomen is soft.      Tenderness: There is no abdominal tenderness.      Comments: Gravid     Neurological:      General: No focal deficit present.      Mental Status: She is alert and oriented to person, place, and time.     Skin:     General: Skin is warm and dry.     Psychiatric:         Mood and Affect: Mood normal.         Behavior: Behavior normal.   Vitals reviewed. Exam conducted with a chaperone present.            FHT:   Cat 1: baseline 135 bpm, moderate variability, 15x15 accels, no decels    TOCO:    No ctx    Lab Results   Component Value Date    WBC 8.95 06/10/2025    HGB 10.6 (L) 06/10/2025    HCT 32.9 (L) 06/10/2025     06/10/2025     Lab Results   Component Value Date     06/14/2018    K 3.9 05/15/2025     05/15/2025    CO2 23 05/15/2025    BUN 9 05/15/2025    CREATININE 0.60 05/15/2025    GLUCOSE 97 06/14/2018    AST 11 (L) 05/15/2025    ALT 11 05/15/2025     Prenatal Labs: Reviewed      Blood type: B pos  Antibody: neg  GBS: neg  HIV: neg  Rubella: non-immune  Syphilis IgM/IgG: neg  HBsAg: neg  HCAb: reactive, negative quant  Chlamydia: neg  Gonorrhea: neg  Diabetes 1 hour screen: 160  3 hour glucose: never completed  Platelets: 288  Hgb: 12.2  >2 Midnights  INPATIENT     Signature/Title: Martha Gomez MD  Date: 6/10/2025  Time: 12:42 PM          [1]   Patient Active Problem  List  Diagnosis    Herpes simplex vulvovaginitis    ASCUS with positive high risk HPV cervical    Previous bariatric surgery complicating pregnancy    HSV-2 seropositive    38 weeks gestation of pregnancy    Hepatitis C antibody positive in blood    Rubella non-immune status, antepartum    Anxiety    Obesity, Class I, BMI 30-34.9    Abnormal glucose tolerance test in pregnancy    History of shoulder dystocia in prior pregnancy, currently pregnant    Herpes simplex virus type 2 (HSV-2) infection affecting pregnancy in third trimester    Encounter for supervision of high risk pregnancy in third trimester, antepartum    IUGR (intrauterine growth retardation) & stillbirth history, pregnant, third trimester    Recurrent UTI    History of stillbirth in currently pregnant patient, third trimester    H/O bariatric surgery   [2]   Past Medical History:  Diagnosis Date    Herpes 2022    Varicella    [3]   Past Surgical History:  Procedure Laterality Date    AMNIOTIC FLUID INDEX WITH NST  6/2/2025    BARIATRIC SURGERY  08/2023   [4] No Known Allergies

## 2025-06-10 NOTE — ASSESSMENT & PLAN NOTE
POD# 1  Recovering well   VSS  Pain well controlled with OTC meds- tylenol/motrion  Routine postpartum care, encourage ambulation, encourage familial bonding  Lactation support for breast/bottle feeding as needed  QBL: 189 Pre-delivery Hgb 10.6--> Post Delivery 9.0  Tolerating regular diet  Pain: Motrin/Tylenol around the clock, oxycodone if needed  Appropriate bowel and bladder function   Incision C/D/I   DVT Ppx: Ambulation, SCDs, Lovenox 40mg

## 2025-06-10 NOTE — ANESTHESIA PROCEDURE NOTES
Spinal Block    Patient location during procedure: OR  Start time: 6/10/2025 2:31 PM  Reason for block: at surgeon's request  Staffing  Performed by: Farhan Rangel DO  Authorized by: Farhan Rangel DO    Preanesthetic Checklist  Completed: patient identified, IV checked, risks and benefits discussed, surgical consent, monitors and equipment checked, pre-op evaluation and timeout performed  Spinal Block  Patient position: sitting  Prep: ChloraPrep and site prepped and draped  Patient monitoring: frequent blood pressure checks, continuous pulse ox and heart rate  Approach: midline  Location: L3-4  Needle  Needle type: Pencan   Needle gauge: 25 G  Needle length: 4 in  Assessment  Sensory level: T4  Injection Assessment:  negative aspiration for heme, no paresthesia on injection and positive aspiration for clear CSF.  Post-procedure:  site cleaned

## 2025-06-10 NOTE — ANESTHESIA POSTPROCEDURE EVALUATION
Post-Op Assessment Note    CV Status:  Stable  Pain Score: 0    Pain management: adequate       Mental Status:  Alert and awake   Hydration Status:  Euvolemic   PONV Controlled:  Controlled   Airway Patency:  Patent     Post Op Vitals Reviewed: Yes    No anethesia notable event occurred.    Staff: CRNA         Last Filed PACU Vitals:  Vitals Value Taken Time   Temp 97.3    Pulse 82    BP 99 68    Resp 16    SpO2 100

## 2025-06-10 NOTE — PLAN OF CARE
Problem: PAIN - ADULT  Goal: Verbalizes/displays adequate comfort level or baseline comfort level  Description: Interventions:  - Encourage patient to monitor pain and request assistance  - Assess pain using appropriate pain scale  - Administer analgesics as ordered based on type and severity of pain and evaluate response  - Implement non-pharmacological measures as appropriate and evaluate response  - Consider cultural and social influences on pain and pain management  - Notify physician/advanced practitioner if interventions unsuccessful or patient reports new pain  - Educate patient/family on pain management process including their role and importance of  reporting pain   - Provide non-pharmacologic/complimentary pain relief interventions  Outcome: Progressing     Problem: INFECTION - ADULT  Goal: Absence or prevention of progression during hospitalization  Description: INTERVENTIONS:  - Assess and monitor for signs and symptoms of infection  - Monitor lab/diagnostic results  - Monitor all insertion sites, i.e. indwelling lines, tubes, and drains  - Administer medications as ordered  - Instruct and encourage patient and family to use good hand hygiene technique  - Identify and instruct in appropriate isolation precautions for identified infection/condition  Outcome: Progressing     Problem: SAFETY ADULT  Goal: Patient will remain free of falls  Description: INTERVENTIONS:  - Keep Call bell within reach  - Keep bed low and locked with side rails adjusted as appropriate  - Keep care items and personal belongings within reach  - Initiate and maintain comfort rounds  Outcome: Progressing     Problem: DISCHARGE PLANNING  Goal: Discharge to home or other facility with appropriate resources  Description: INTERVENTIONS:  - Identify barriers to discharge w/patient and caregiver  - Arrange for needed discharge resources and transportation as appropriate  - Identify discharge learning needs (meds, wound care, etc.)  -  Arrange for interpretive services to assist at discharge as needed  - Refer to Case Management Department for coordinating discharge planning if the patient needs post-hospital services based on physician/advanced practitioner order or complex needs related to functional status, cognitive ability, or social support system  Outcome: Progressing     Problem: Knowledge Deficit  Goal: Patient/family/caregiver demonstrates understanding of disease process, treatment plan, medications, and discharge instructions  Description: Complete learning assessment and assess knowledge base.  Interventions:  - Provide teaching at level of understanding  - Provide teaching via preferred learning methods  Outcome: Progressing     Problem: BIRTH - VAGINAL/ SECTION  Goal: Fetal and maternal status remain reassuring during the birth process  Description: INTERVENTIONS:  - Monitor vital signs  - Monitor fetal heart rate  - Monitor uterine activity  - Monitor labor progression (vaginal delivery)  - DVT prophylaxis  - Antibiotic prophylaxis  Outcome: Progressing  Goal: Emotionally satisfying birthing experience for mother/fetus  Description: Interventions:  - Assess, plan, implement and evaluate the nursing care given to the patient in labor  - Advocate the philosophy that each childbirth experience is a unique experience and support the family's chosen level of involvement and control during the labor process   - Actively participate in both the patient's and family's teaching of the birth process  - Consider cultural, Scientologist and age-specific factors and plan care for the patient in labor  Outcome: Progressing

## 2025-06-10 NOTE — PLAN OF CARE
Problem: PAIN - ADULT  Goal: Verbalizes/displays adequate comfort level or baseline comfort level  Description: Interventions:  - Encourage patient to monitor pain and request assistance  - Assess pain using appropriate pain scale  - Administer analgesics as ordered based on type and severity of pain and evaluate response  - Implement non-pharmacological measures as appropriate and evaluate response  - Consider cultural and social influences on pain and pain management  - Notify physician/advanced practitioner if interventions unsuccessful or patient reports new pain  - Educate patient/family on pain management process including their role and importance of  reporting pain   - Provide non-pharmacologic/complimentary pain relief interventions  6/10/2025 1950 by Deb Don RN  Outcome: Progressing  6/10/2025 1950 by Deb Don RN  Outcome: Progressing     Problem: INFECTION - ADULT  Goal: Absence or prevention of progression during hospitalization  Description: INTERVENTIONS:  - Assess and monitor for signs and symptoms of infection  - Monitor lab/diagnostic results  - Monitor all insertion sites, i.e. indwelling lines, tubes, and drains  - Administer medications as ordered  - Instruct and encourage patient and family to use good hand hygiene technique  - Identify and instruct in appropriate isolation precautions for identified infection/condition  6/10/2025 1950 by Deb Don RN  Outcome: Progressing  6/10/2025 1950 by Deb Don RN  Outcome: Progressing     Problem: SAFETY ADULT  Goal: Patient will remain free of falls  Description: INTERVENTIONS:  - Keep Call bell within reach  - Keep bed low and locked with side rails adjusted as appropriate  - Keep care items and personal belongings within reach  - Initiate and maintain comfort rounds  6/10/2025 1950 by Deb Don RN  Outcome: Progressing  6/10/2025 1950 by Deb Don RN  Outcome: Progressing     Problem: DISCHARGE PLANNING  Goal: Discharge to home  or other facility with appropriate resources  Description: INTERVENTIONS:  - Identify barriers to discharge w/patient and caregiver  - Arrange for needed discharge resources and transportation as appropriate  - Identify discharge learning needs (meds, wound care, etc.)  - Arrange for interpretive services to assist at discharge as needed  - Refer to Case Management Department for coordinating discharge planning if the patient needs post-hospital services based on physician/advanced practitioner order or complex needs related to functional status, cognitive ability, or social support system  6/10/2025 1950 by Deb Don RN  Outcome: Progressing  6/10/2025 1950 by Deb Don RN  Outcome: Progressing     Problem: Knowledge Deficit  Goal: Patient/family/caregiver demonstrates understanding of disease process, treatment plan, medications, and discharge instructions  Description: Complete learning assessment and assess knowledge base.  Interventions:  - Provide teaching at level of understanding  - Provide teaching via preferred learning methods  6/10/2025 1950 by Deb Don RN  Outcome: Progressing  6/10/2025 1950 by Deb Don RN  Outcome: Progressing     Problem: POSTPARTUM  Goal: Experiences normal postpartum course  Description: INTERVENTIONS:  - Monitor maternal vital signs  - Assess uterine involution and lochia  6/10/2025 1950 by Deb Don RN  Outcome: Progressing  6/10/2025 1950 by Deb Don RN  Outcome: Progressing  Goal: Appropriate maternal -  bonding  Description: INTERVENTIONS:  - Identify family support  - Assess for appropriate maternal/infant bonding   -Encourage maternal/infant bonding opportunities  - Referral to  or  as needed  6/10/2025 1950 by Deb Don RN  Outcome: Progressing  6/10/2025 1950 by Deb Don RN  Outcome: Progressing  Goal: Establishment of infant feeding pattern  Description: INTERVENTIONS:  - Assess breast/bottle feeding  - Refer to  lactation as needed  6/10/2025 1950 by Deb Don RN  Outcome: Progressing  6/10/2025 1950 by Deb Don RN  Outcome: Progressing  Goal: Incision(s), wounds(s) or drain site(s) healing without S/S of infection  Description: INTERVENTIONS  - Assess and document dressing, incision, wound bed, drain sites and surrounding tissue  - Provide patient and family education    Outcome: Progressing  6/10/2025 1950 by Dbe Don RN  Outcome: Progressing

## 2025-06-10 NOTE — ASSESSMENT & PLAN NOTE
Recurrent UTIs this pregnancy vs. UTI which hasn't responded to treatment.  Questionable diagnosis of pyelonephritis in May 2025, ultimately deemed to be more consistent with a UTI.     Culture & antibiotic history:  12/2/24: Proteus, resistant to Macrobid & Tetracycline, treated with Keflex  12/23/24: Proteus, resistant to Macrobid & Tetracycline, treated with Ampicillin  3/24/25: Proteus, resistant to Macrobid & Tetracycline, treated with Keflex  5/15/25: Proteus, resistant to Macrobid & Tetracycline, treated with Ceftriaxone then Macrobid  6/8/25: culture pending, discharged on Cefpodoxime with plan for transition to Bactrim postpartum given prior culture-proven sensitivity

## 2025-06-10 NOTE — ANESTHESIA POSTPROCEDURE EVALUATION
Post-Op Assessment Note    CV Status:  Stable    Pain management: adequate       Mental Status:  Alert and awake   Hydration Status:  Euvolemic   PONV Controlled:  Controlled   Airway Patency:  Patent     Post Op Vitals Reviewed: Yes    No anethesia notable event occurred.    Staff: Anesthesiologist           Last Filed PACU Vitals:  Vitals Value Taken Time   Temp 97.7 °F (36.5 °C) 06/10/25 16:05   Pulse 80 06/10/25 16:05   BP 93/46 06/10/25 16:05   Resp 20 06/10/25 16:05   SpO2 96 % 06/10/25 16:05       Modified Adolfo:     Vitals Value Taken Time   Activity 1 06/10/25 16:05   Respiration 2 06/10/25 16:05   Circulation 2 06/10/25 16:05   Consciousness 2 06/10/25 16:05   Oxygen Saturation 2 06/10/25 16:05     Modified Adolfo Score: 9

## 2025-06-10 NOTE — PLAN OF CARE
Problem: PAIN - ADULT  Goal: Verbalizes/displays adequate comfort level or baseline comfort level  Description: Interventions:  - Encourage patient to monitor pain and request assistance  - Assess pain using appropriate pain scale  - Administer analgesics as ordered based on type and severity of pain and evaluate response  - Implement non-pharmacological measures as appropriate and evaluate response  - Consider cultural and social influences on pain and pain management  - Notify physician/advanced practitioner if interventions unsuccessful or patient reports new pain  - Educate patient/family on pain management process including their role and importance of  reporting pain   - Provide non-pharmacologic/complimentary pain relief interventions  Outcome: Progressing     Problem: INFECTION - ADULT  Goal: Absence or prevention of progression during hospitalization  Description: INTERVENTIONS:  - Assess and monitor for signs and symptoms of infection  - Monitor lab/diagnostic results  - Monitor all insertion sites, i.e. indwelling lines, tubes, and drains  - Administer medications as ordered  - Instruct and encourage patient and family to use good hand hygiene technique  - Identify and instruct in appropriate isolation precautions for identified infection/condition  Outcome: Progressing     Problem: SAFETY ADULT  Goal: Patient will remain free of falls  Description: INTERVENTIONS:  - Keep Call bell within reach  - Keep bed low and locked with side rails adjusted as appropriate  - Keep care items and personal belongings within reach  - Initiate and maintain comfort rounds  Outcome: Progressing     Problem: DISCHARGE PLANNING  Goal: Discharge to home or other facility with appropriate resources  Description: INTERVENTIONS:  - Identify barriers to discharge w/patient and caregiver  - Arrange for needed discharge resources and transportation as appropriate  - Identify discharge learning needs (meds, wound care, etc.)  -  Arrange for interpretive services to assist at discharge as needed  - Refer to Case Management Department for coordinating discharge planning if the patient needs post-hospital services based on physician/advanced practitioner order or complex needs related to functional status, cognitive ability, or social support system  Outcome: Progressing     Problem: Knowledge Deficit  Goal: Patient/family/caregiver demonstrates understanding of disease process, treatment plan, medications, and discharge instructions  Description: Complete learning assessment and assess knowledge base.  Interventions:  - Provide teaching at level of understanding  - Provide teaching via preferred learning methods  Outcome: Progressing     Problem: POSTPARTUM  Goal: Experiences normal postpartum course  Description: INTERVENTIONS:  - Monitor maternal vital signs  - Assess uterine involution and lochia  Outcome: Progressing  Goal: Appropriate maternal -  bonding  Description: INTERVENTIONS:  - Identify family support  - Assess for appropriate maternal/infant bonding   -Encourage maternal/infant bonding opportunities  - Referral to  or  as needed  Outcome: Progressing  Goal: Establishment of infant feeding pattern  Description: INTERVENTIONS:  - Assess breast/bottle feeding  - Refer to lactation as needed  Outcome: Progressing  Goal: Incision(s), wounds(s) or drain site(s) healing without S/S of infection  Description: INTERVENTIONS  - Assess and document dressing, incision, wound bed, drain sites and surrounding tissue  - Provide patient and family education  Outcome: Progressing     Problem: BIRTH - VAGINAL/ SECTION  Goal: Fetal and maternal status remain reassuring during the birth process  Description: INTERVENTIONS:  - Monitor vital signs  - Monitor fetal heart rate  - Monitor uterine activity  - Monitor labor progression (vaginal delivery)  - DVT prophylaxis  - Antibiotic prophylaxis  Outcome:  Completed  Goal: Emotionally satisfying birthing experience for mother/fetus  Description: Interventions:  - Assess, plan, implement and evaluate the nursing care given to the patient in labor  - Advocate the philosophy that each childbirth experience is a unique experience and support the family's chosen level of involvement and control during the labor process   - Actively participate in both the patient's and family's teaching of the birth process  - Consider cultural, Yazidi and age-specific factors and plan care for the patient in labor  Outcome: Completed

## 2025-06-11 PROBLEM — Z98.891 S/P CESAREAN SECTION: Status: ACTIVE | Noted: 2024-12-04

## 2025-06-11 LAB
ERYTHROCYTE [DISTWIDTH] IN BLOOD BY AUTOMATED COUNT: 13.8 % (ref 11.6–15.1)
HCT VFR BLD AUTO: 28.4 % (ref 34.8–46.1)
HGB BLD-MCNC: 9 G/DL (ref 11.5–15.4)
MCH RBC QN AUTO: 24.7 PG (ref 26.8–34.3)
MCHC RBC AUTO-ENTMCNC: 31.7 G/DL (ref 31.4–37.4)
MCV RBC AUTO: 78 FL (ref 82–98)
PLATELET # BLD AUTO: 243 THOUSANDS/UL (ref 149–390)
PMV BLD AUTO: 10.7 FL (ref 8.9–12.7)
RBC # BLD AUTO: 3.64 MILLION/UL (ref 3.81–5.12)
TREPONEMA PALLIDUM IGG+IGM AB [PRESENCE] IN SERUM OR PLASMA BY IMMUNOASSAY: NORMAL
WBC # BLD AUTO: 8.53 THOUSAND/UL (ref 4.31–10.16)

## 2025-06-11 PROCEDURE — 99024 POSTOP FOLLOW-UP VISIT: CPT | Performed by: CLINICAL NURSE SPECIALIST

## 2025-06-11 PROCEDURE — 85027 COMPLETE CBC AUTOMATED: CPT

## 2025-06-11 RX ORDER — SIMETHICONE 80 MG
80 TABLET,CHEWABLE ORAL EVERY 6 HOURS PRN
Status: DISCONTINUED | OUTPATIENT
Start: 2025-06-11 | End: 2025-06-12 | Stop reason: HOSPADM

## 2025-06-11 RX ADMIN — DOCUSATE SODIUM 100 MG: 100 CAPSULE, LIQUID FILLED ORAL at 09:59

## 2025-06-11 RX ADMIN — AMOXICILLIN 500 MG: 500 CAPSULE ORAL at 17:17

## 2025-06-11 RX ADMIN — ENOXAPARIN SODIUM 40 MG: 40 INJECTION SUBCUTANEOUS at 09:59

## 2025-06-11 RX ADMIN — ACETAMINOPHEN 1000 MG: 1000 INJECTION, SOLUTION INTRAVENOUS at 12:34

## 2025-06-11 RX ADMIN — IRON SUCROSE 200 MG: 20 INJECTION, SOLUTION INTRAVENOUS at 11:10

## 2025-06-11 RX ADMIN — OXYCODONE HYDROCHLORIDE 5 MG: 5 TABLET ORAL at 22:52

## 2025-06-11 RX ADMIN — DOCUSATE SODIUM 100 MG: 100 CAPSULE, LIQUID FILLED ORAL at 17:17

## 2025-06-11 RX ADMIN — AMOXICILLIN 500 MG: 500 CAPSULE ORAL at 09:59

## 2025-06-11 RX ADMIN — AMOXICILLIN 500 MG: 500 CAPSULE ORAL at 01:56

## 2025-06-11 RX ADMIN — ACETAMINOPHEN 1000 MG: 1000 INJECTION, SOLUTION INTRAVENOUS at 00:06

## 2025-06-11 RX ADMIN — ACETAMINOPHEN 1000 MG: 1000 INJECTION, SOLUTION INTRAVENOUS at 05:59

## 2025-06-11 RX ADMIN — ACETAMINOPHEN 1000 MG: 1000 INJECTION, SOLUTION INTRAVENOUS at 18:10

## 2025-06-11 NOTE — NURSING NOTE
SAVE your life magnet (English) and  discharge pamphlets (English) given to patient. Discharge teaching provided to patient and all questions answered at this time. Patient encouraged to call if any questions arise.

## 2025-06-11 NOTE — PROGRESS NOTES
Progress Note - OB/GYN   Name: Merline Webb 30 y.o. female I MRN: [id number]  Unit/Bed#: -01 I Date of Admission: 6/10/2025   Date of Service: 2025 I Hospital Day: 1     Assessment & Plan  Previous bariatric surgery complicating pregnancy  Sleeve 8/15/23  S/P  section  POD# 1  Recovering well   VSS  Pain well controlled with OTC meds- tylenol/motrion  Routine postpartum care, encourage ambulation, encourage familial bonding  Lactation support for breast/bottle feeding as needed  QBL: 189 Pre-delivery Hgb 10.6--> Post Delivery 9.0  Tolerating regular diet  Pain: Motrin/Tylenol around the clock, oxycodone if needed  Appropriate bowel and bladder function   Incision C/D/I   DVT Ppx: Ambulation, SCDs, Lovenox 40mg     Hepatitis C antibody positive in blood  + antibody on prenatal labs  F/u quant not detected  Also had +antibody with negative quant in prior pregnancy  Rubella non-immune status, antepartum  MMR postpartum  Recurrent UTI  Recurrent UTIs this pregnancy vs. UTI which hasn't responded to treatment.  Questionable diagnosis of pyelonephritis in May 2025, ultimately deemed to be more consistent with a UTI.     Culture & antibiotic history:  24: Proteus, resistant to Macrobid & Tetracycline, treated with Keflex  24: Proteus, resistant to Macrobid & Tetracycline, treated with Ampicillin  3/24/25: Proteus, resistant to Macrobid & Tetracycline, treated with Keflex  5/15/25: Proteus, resistant to Macrobid & Tetracycline, treated with Ceftriaxone then Macrobid  25: culture pending, discharged on Cefpodoxime with plan for transition to Bactrim postpartum given prior culture-proven sensitivity    Progress Note - OB/GYN  Merline Webb 30 y.o. female MRN: [id number]  Unit/Bed#: -01 Encounter: [id number]      Merline Webb is a patient of: [facility]. She is PPD# 1  Delivery Type: , Low Transverse   Recovering well and is  "stable     Disposition    - Anticipate discharge home on PPD# 2-4      Subjective/Objective     Chief Complaint: Postpartum State     Subjective:    Merline Webb is PPD/POD#1 s/p  primary  section, low transverse incision.   She has no current complaints.  Pain is well controlled. She is recovering well and is stable.     Breastfeeding:  yes    Tolerating PO: yes  Nausea or Vomiting: no  Voiding: yes  Flatus: yes; has had no bowel movement.   Ambulating: yes  Chest pain: no  Shortness of breath: no  Leg pain: no  Incision: mild/appropriate TTP  Lochia: appropriate   Perineum: intact    Vitals:   BP 94/57 (BP Location: Left arm)   Pulse 72   Temp 98.2 °F (36.8 °C) (Oral)   Resp 18   Ht 5' 3\" (1.6 m)   Wt 96.1 kg (211 lb 12.8 oz)   LMP 2024   SpO2 98%   Breastfeeding Yes   BMI 37.52 kg/m²       Intake/Output Summary (Last 24 hours) at 2025 0844  Last data filed at 2025 0430  Gross per 24 hour   Intake 1000 ml   Output 1239 ml   Net -239 ml       Invasive Devices       Peripheral Intravenous Line  Duration             Peripheral IV 06/10/25 Dorsal (posterior);Left Hand <1 day                    Physical Exam:   OBGyn Exam  GEN: Merline Webb appears well, alert and oriented x 3, pleasant and cooperative   CARDIO: RRR, no murmurs or rubs  RESP:  CTAB, no wheezes or rales  ABDOMEN: soft, no tenderness, no distention, fundus @ U, Incision C/D/I  EXTREMITIES: SCDs on, non tender, no erythema, b/l Jared's sign negative      Labs:   Hemoglobin   Date Value Ref Range Status   2025 9.0 (L) 11.5 - 15.4 g/dL Final   06/10/2025 10.6 (L) 11.5 - 15.4 g/dL Final   2018 13.3 12.0 - 16.0 G/DL Final     WBC   Date Value Ref Range Status   2025 8.53 4.31 - 10.16 Thousand/uL Final   06/10/2025 8.95 4.31 - 10.16 Thousand/uL Final   2018 9.6 4.5 - 11.0 K/MCL Final     Platelets   Date Value Ref Range Status   2025 243 149 - 390 Thousands/uL Final "   06/10/2025 288 149 - 390 Thousands/uL Final   06/14/2018 362 150 - 450 K/MCL Final          ELIDA Gutierrez  6/11/2025  8:44 AM

## 2025-06-11 NOTE — LACTATION NOTE
This note was copied from a baby's chart.  CONSULT - LACTATION  Baby Girl (Dewayne Webb 1 days female MRN: [id number]    [facility] NURSERY Room / Bed:  422(N)/ 422(N) Encounter: [id number]    Maternal Information     MOTHER:  Merline Guzman  Maternal Age: 30 y.o.  OB History: # 1 - Date: 24, Sex: None, Weight: 2551 g (5 lb 10 oz), GA: 40w1d, Type: Vaginal, Spontaneous, Apgar1: 0, Apgar5: 0, Living: Fetal Demise, Birth Comments: None    # 2 - Date: 06/10/25, Sex: Female, Weight: 2760 g (6 lb 1.4 oz), GA: 38w1d, Type: , Low Transverse, Apgar1: 8, Apgar5: 9, Living: Living, Birth Comments: None   Previous breast reduction surgery? No    Lactation history:   Has patient previously breast fed: No   How long had patient previously breast fed:     Previous breast feeding complications:       Past Surgical History:   Procedure Laterality Date    AMNIOTIC FLUID INDEX WITH NST  2025    BARIATRIC SURGERY  2023    MO  DELIVERY ONLY N/A 6/10/2025    Procedure:  SECTION ();  Surgeon: Dee Dey MD;  [facility]: [facility];  Service: Obstetrics       Birth information:  YOB: 2026   Time of birth: 2:47 PM   Sex: female   Delivery type: , Low Transverse   Birth Weight: 2760 g (6 lb 1.4 oz)   Percent of Weight Change: 1%     Gestational Age: 38w1d   [unfilled]    Reason for Consult:    Reason for Consult: Initial assessment (ext) - 20 min, Other (Comment)    Risk Factors:    Risk Factors: Language barrier (#[id number])    Breast and nipple assessment:   Breasts/Nipples  Breastfeeding Status: Yes  Breastfeeding Progress: Not yet established  Reasons for not Breastfeeding: Maternal preference (mixed feeding intent)    OB Lactation Tools:    Other OB Lactation Tools  Feeding Devices: Bottle (slow flow nipples)    Breast Pump:    Breast Pump  Pump: Personal (Has a breast pump through ins.)        Assessment: No oral assessment done. Baby doing skin to skin w/ mother.    Feeding assessment: No latch assessed. Encouraged for latch assessment. Patient reports she is attempting at the breast.    Feeding recommendations:  breast feed on demand Paced bottle feed when supplementing. Mixed feeding intent.    Australian  used (#[id number]) via [employer].    Consulted with Merline for an initial visit.    Merline reports that she is attempting at the breast but has been having no success. Patient has been supplementing with formula. Patient reports her feeding goal is to provide both breast milk and formula.     Provided verbal guidance on latching and positioning baby at the breast.   Stressed importance of good alignment with baby's belly facing mother and baby's ear/shoulder/hip in a straight line.  Reviewed to align baby's nose to the nipple and allowing for the baby to open wide, with the baby's chin touching the breast first.  Referred to the RSB book in Australian language to review positions with patient.    Encouraged to call lactation for next feed for assistance with latching.  Encouraged patient to utilize nursing staff for support as needed as well.    Discussed timeline of breast milk transitioning. Educated on colostrum being made during pregnancy.    Educated on baby's belly size and cluster feeding.   Discussed paced bottle feeding when supplementing.   Discussed to sit baby up in an upright position and to introduce the bottle at a horizontal level; allowing the baby to pace the feed.  Reviewed how paced bottle feeding protects breastfeeding relationship, prevents over feeding and gassiness.    Educated on baby's hunger vs fullness cues.    Feeding plan:  Discussed the importance of ensuring that baby feeds 8-12x in 24 hours and not waiting over 3 hours to feed. Reviewed to watch the baby for hunger and fullness cues.  Patient is encouraged to breastfeed on demand, every 2-3 hours or when baby  showing early feeding cues.   Supplement using paced bottle feeding.    Discussed how to know the baby is feeding adequately:  -Baby effectively feeding at least 8-12 times in 24 hours.  -Baby is showing fullness cues, post feed.  -Baby is having adequate amounts of diapers and meeting goal diapers.  -Baby's weight loss is within normal ranges.     Patient started to fall asleep during encounter. Merline's mother took baby from patient so mother can sleep.    Discussed community resources for continued breastfeeding support once discharged home. Encouraged to contact with [facility] as needed.     Patient was encouraged to contact lactation for a latch assessment, continued support and/or questions that arise.    OSITO Almeida 6/11/2025 1:23 PM

## 2025-06-12 VITALS
WEIGHT: 211.8 LBS | TEMPERATURE: 98.2 F | OXYGEN SATURATION: 96 % | BODY MASS INDEX: 37.53 KG/M2 | SYSTOLIC BLOOD PRESSURE: 93 MMHG | RESPIRATION RATE: 18 BRPM | DIASTOLIC BLOOD PRESSURE: 59 MMHG | HEART RATE: 80 BPM | HEIGHT: 63 IN

## 2025-06-12 PROCEDURE — 99024 POSTOP FOLLOW-UP VISIT: CPT | Performed by: OBSTETRICS & GYNECOLOGY

## 2025-06-12 PROCEDURE — NC001 PR NO CHARGE: Performed by: OBSTETRICS & GYNECOLOGY

## 2025-06-12 RX ORDER — ACETAMINOPHEN 325 MG/1
975 TABLET ORAL EVERY 6 HOURS PRN
Status: DISCONTINUED | OUTPATIENT
Start: 2025-06-12 | End: 2025-06-12

## 2025-06-12 RX ORDER — ACETAMINOPHEN 160 MG/5ML
650 SUSPENSION ORAL EVERY 6 HOURS PRN
Status: DISCONTINUED | OUTPATIENT
Start: 2025-06-12 | End: 2025-06-12

## 2025-06-12 RX ORDER — ACETAMINOPHEN 160 MG/5ML
650 SUSPENSION ORAL EVERY 6 HOURS PRN
Status: DISCONTINUED | OUTPATIENT
Start: 2025-06-12 | End: 2025-06-12 | Stop reason: HOSPADM

## 2025-06-12 RX ORDER — DOCUSATE SODIUM 100 MG/1
100 CAPSULE, LIQUID FILLED ORAL 2 TIMES DAILY
Qty: 60 CAPSULE | Refills: 0 | Status: SHIPPED | OUTPATIENT
Start: 2025-06-12

## 2025-06-12 RX ORDER — OXYCODONE HYDROCHLORIDE 5 MG/1
5 TABLET ORAL EVERY 6 HOURS PRN
Qty: 10 TABLET | Refills: 0 | Status: SHIPPED | OUTPATIENT
Start: 2025-06-12

## 2025-06-12 RX ADMIN — ACETAMINOPHEN 1000 MG: 1000 INJECTION, SOLUTION INTRAVENOUS at 06:19

## 2025-06-12 RX ADMIN — ACETAMINOPHEN 650 MG: 650 SUSPENSION ORAL at 13:02

## 2025-06-12 RX ADMIN — ENOXAPARIN SODIUM 40 MG: 40 INJECTION SUBCUTANEOUS at 10:26

## 2025-06-12 RX ADMIN — AMOXICILLIN 500 MG: 500 CAPSULE ORAL at 10:26

## 2025-06-12 RX ADMIN — ACETAMINOPHEN 1000 MG: 1000 INJECTION, SOLUTION INTRAVENOUS at 00:13

## 2025-06-12 RX ADMIN — AMOXICILLIN 500 MG: 500 CAPSULE ORAL at 02:20

## 2025-06-12 RX ADMIN — DOCUSATE SODIUM 100 MG: 100 CAPSULE, LIQUID FILLED ORAL at 10:26

## 2025-06-12 RX ADMIN — SIMETHICONE 80 MG: 80 TABLET, CHEWABLE ORAL at 00:00

## 2025-06-12 NOTE — LACTATION NOTE
This note was copied from a baby's chart.     06/12/25 1300   Lactation Consultation   Reason for Consult 10 m  (spoke with staff; Mom fed baby prior to calling for assistance)   Lactation Consultant Total Time 10   Maternal Information   Has mother  before? No   Infant to breast within first hour of birth? Yes   Exclusive Pump and Bottle Feed No   LATCH Documentation   Having latch problems?   (bottle feed formula instaed of waiting for latch assistance)   Breasts/Nipples   Breastfeeding Status Yes   Breastfeeding Progress Not yet established;Other issues (comment)  (Early supplement)   Reasons for not Breastfeeding Maternal preference  (has mixed feeding intent)   Other OB Lactation Tools   Feeding Devices Pump;Syringe;Bottle   Breast Pump   Pump 3  (has breast pump from Insurance)   Pump Review/Education Milk storage   Patient Follow-Up   Lactation Consult Status 2   Follow-Up Type Inpatient;Call as needed   Other OB Lactation Documentation    Additional Problem Noted Follow up Visit - Risks of early supplement  (Encouraged Lactation support)       Encouraged parents to call for assistance, questions, and concerns about breastfeeding.

## 2025-06-12 NOTE — PLAN OF CARE
Outpatient Rehabilitation - Wound/Debridement D/C Summary        Patient Name: Michelle Weaver  : 1965  MRN: [id number]  Today's Date: 2022                  Admit Date: (Not on file)    Visit Dx:  No diagnosis found.    Patient Active Problem List   Diagnosis   • Cellulitis of left leg   • Toe ulcer (HCC)   • Anxiety   • Panic attacks   • Primary hypertension   • Hyperglycemia   • Type 2 diabetes mellitus with hyperglycemia, without long-term current use of insulin (HCC)        Past Medical History:   Diagnosis Date   • Anxiety 2022   • Panic attacks 2022   • Primary hypertension 2022        Past Surgical History:   Procedure Laterality Date   • BREAST CYST EXCISION Right     approx 2017         EVALUATION                WOUND DEBRIDEMENT                            Recommendation and Plan      Goals   PT OP Goals     Row Name 22 0900          PT Short Term Goals    STG Date to Achieve 10/30/22  -     STG 1 Decrease L ankle wound size by 25% as evidence of wound healing.  -     STG 1 Progress Met  -     STG 2 Increase L ankle wound granulation to >50% to improve healing potential.  -     STG 2 Progress Met  -     STG 3 Pt to have no s/s of infection to L ankle wound.  -     STG 3 Progress Met  -        Long Term Goals    LTG Date to Achieve 22  -     LTG 1 Decrease L ankle wound size by 85% as evidence of wound healing.  -     LTG 1 Progress Not Met  -     LTG 2 Increase L ankle wound granulation to >90% to improve healing potential.  -     LTG 2 Progress Met  -     LTG 3 Pt and family able to change dressing with no questions / issues  -     LTG 3 Progress Not Met  -     LTG 4 L great toe wound to be closed/resurfaced to allow for return to weight-bearing activities.  -     LTG 4 Progress Not Met  -           User Key  (r) = Recorded By, (t) = Taken By, (c) = Cosigned By    Initials Name Provider Type    Beth Johnson, PT Physical    Problem: PAIN - ADULT  Goal: Verbalizes/displays adequate comfort level or baseline comfort level  Description: Interventions:  - Encourage patient to monitor pain and request assistance  - Assess pain using appropriate pain scale  - Administer analgesics as ordered based on type and severity of pain and evaluate response  - Implement non-pharmacological measures as appropriate and evaluate response  - Consider cultural and social influences on pain and pain management  - Notify physician/advanced practitioner if interventions unsuccessful or patient reports new pain  - Educate patient/family on pain management process including their role and importance of  reporting pain   - Provide non-pharmacologic/complimentary pain relief interventions  Outcome: Progressing     Problem: INFECTION - ADULT  Goal: Absence or prevention of progression during hospitalization  Description: INTERVENTIONS:  - Assess and monitor for signs and symptoms of infection  - Monitor lab/diagnostic results  - Monitor all insertion sites, i.e. indwelling lines, tubes, and drains  - Administer medications as ordered  - Instruct and encourage patient and family to use good hand hygiene technique  - Identify and instruct in appropriate isolation precautions for identified infection/condition  Outcome: Progressing     Problem: SAFETY ADULT  Goal: Patient will remain free of falls  Description: INTERVENTIONS:  - Keep Call bell within reach  - Keep bed low and locked with side rails adjusted as appropriate  - Keep care items and personal belongings within reach  - Initiate and maintain comfort rounds  Outcome: Progressing     Problem: DISCHARGE PLANNING  Goal: Discharge to home or other facility with appropriate resources  Description: INTERVENTIONS:  - Identify barriers to discharge w/patient and caregiver  - Arrange for needed discharge resources and transportation as appropriate  - Identify discharge learning needs (meds, wound care, etc.)  -  Arrange for interpretive services to assist at discharge as needed  - Refer to Case Management Department for coordinating discharge planning if the patient needs post-hospital services based on physician/advanced practitioner order or complex needs related to functional status, cognitive ability, or social support system  Outcome: Progressing     Problem: Knowledge Deficit  Goal: Patient/family/caregiver demonstrates understanding of disease process, treatment plan, medications, and discharge instructions  Description: Complete learning assessment and assess knowledge base.  Interventions:  - Provide teaching at level of understanding  - Provide teaching via preferred learning methods  Outcome: Progressing     Problem: POSTPARTUM  Goal: Experiences normal postpartum course  Description: INTERVENTIONS:  - Monitor maternal vital signs  - Assess uterine involution and lochia  Outcome: Progressing  Goal: Appropriate maternal -  bonding  Description: INTERVENTIONS:  - Identify family support  - Assess for appropriate maternal/infant bonding   -Encourage maternal/infant bonding opportunities  - Referral to  or  as needed  Outcome: Progressing  Goal: Establishment of infant feeding pattern  Description: INTERVENTIONS:  - Assess breast/bottle feeding  - Refer to lactation as needed  Outcome: Progressing  Goal: Incision(s), wounds(s) or drain site(s) healing without S/S of infection  Description: INTERVENTIONS  - Assess and document dressing, incision, wound bed, drain sites and surrounding tissue  - Provide patient and family education  Outcome: Progressing      Therapist                Time Calculation:               OP Discharge Summary     Row Name 12/27/22 0948             OP PT Discharge Summary    Date of Discharge 12/27/22  -[facility]      Reason for Discharge other (comment)  pt tentatively d/c 10/25/22, would now require a new MD order to resume OP wound care  -      Outcomes Achieved Patient able to partially acheive established goals  -      Discharge Destination Unknown  -            User Key  (r) = Recorded By, (t) = Taken By, (c) = Cosigned By    Initials Name Provider Type    Beth Johnson, PT Physical Therapist                Beth Banks, PT  12/27/2022

## 2025-06-12 NOTE — ASSESSMENT & PLAN NOTE
POD# 2  Recovering well   VSS  Pain well controlled with OTC meds- tylenol/motrion  Routine postpartum care, encourage ambulation, encourage familial bonding  Lactation support for breast/bottle feeding as needed  QBL: 189 Pre-delivery Hgb 10.6--> Post Delivery 9.0  Tolerating regular diet  Pain: Motrin/Tylenol around the clock, oxycodone if needed  Appropriate bowel and bladder function   Incision C/D/I   DVT Ppx: Ambulation, SCDs, Lovenox 40mg

## 2025-06-12 NOTE — NURSING NOTE
SAVE your life magnet (Nigerien) and  discharge pamphlets (Nigerien and English) given to patient. Discharge education provided to patient in Nigerien and patient verbalized understanding. Patient encouraged to call if questions arise.

## 2025-06-12 NOTE — PLAN OF CARE
Problem: PAIN - ADULT  Goal: Verbalizes/displays adequate comfort level or baseline comfort level  Description: Interventions:  - Encourage patient to monitor pain and request assistance  - Assess pain using appropriate pain scale  - Administer analgesics as ordered based on type and severity of pain and evaluate response  - Implement non-pharmacological measures as appropriate and evaluate response  - Consider cultural and social influences on pain and pain management  - Notify physician/advanced practitioner if interventions unsuccessful or patient reports new pain  - Educate patient/family on pain management process including their role and importance of  reporting pain   - Provide non-pharmacologic/complimentary pain relief interventions  Outcome: Progressing     Problem: INFECTION - ADULT  Goal: Absence or prevention of progression during hospitalization  Description: INTERVENTIONS:  - Assess and monitor for signs and symptoms of infection  - Monitor lab/diagnostic results  - Monitor all insertion sites, i.e. indwelling lines, tubes, and drains  - Administer medications as ordered  - Instruct and encourage patient and family to use good hand hygiene technique  - Identify and instruct in appropriate isolation precautions for identified infection/condition  Outcome: Progressing     Problem: SAFETY ADULT  Goal: Patient will remain free of falls  Description: INTERVENTIONS:  - Keep Call bell within reach  - Keep bed low and locked with side rails adjusted as appropriate  - Keep care items and personal belongings within reach  - Initiate and maintain comfort rounds  Outcome: Progressing     Problem: DISCHARGE PLANNING  Goal: Discharge to home or other facility with appropriate resources  Description: INTERVENTIONS:  - Identify barriers to discharge w/patient and caregiver  - Arrange for needed discharge resources and transportation as appropriate  - Identify discharge learning needs (meds, wound care, etc.)  -  Arrange for interpretive services to assist at discharge as needed  - Refer to Case Management Department for coordinating discharge planning if the patient needs post-hospital services based on physician/advanced practitioner order or complex needs related to functional status, cognitive ability, or social support system  Outcome: Progressing     Problem: Knowledge Deficit  Goal: Patient/family/caregiver demonstrates understanding of disease process, treatment plan, medications, and discharge instructions  Description: Complete learning assessment and assess knowledge base.  Interventions:  - Provide teaching at level of understanding  - Provide teaching via preferred learning methods  Outcome: Progressing     Problem: POSTPARTUM  Goal: Experiences normal postpartum course  Description: INTERVENTIONS:  - Monitor maternal vital signs  - Assess uterine involution and lochia  Outcome: Progressing  Goal: Appropriate maternal -  bonding  Description: INTERVENTIONS:  - Identify family support  - Assess for appropriate maternal/infant bonding   -Encourage maternal/infant bonding opportunities  - Referral to  or  as needed  Outcome: Progressing  Goal: Establishment of infant feeding pattern  Description: INTERVENTIONS:  - Assess breast/bottle feeding  - Refer to lactation as needed  Outcome: Progressing  Goal: Incision(s), wounds(s) or drain site(s) healing without S/S of infection  Description: INTERVENTIONS  - Assess and document dressing, incision, wound bed, drain sites and surrounding tissue  - Provide patient and family education  Outcome: Progressing

## 2025-06-12 NOTE — PLAN OF CARE
Problem: PAIN - ADULT  Goal: Verbalizes/displays adequate comfort level or baseline comfort level  Description: Interventions:  - Encourage patient to monitor pain and request assistance  - Assess pain using appropriate pain scale  - Administer analgesics as ordered based on type and severity of pain and evaluate response  - Implement non-pharmacological measures as appropriate and evaluate response  - Consider cultural and social influences on pain and pain management  - Notify physician/advanced practitioner if interventions unsuccessful or patient reports new pain  - Educate patient/family on pain management process including their role and importance of  reporting pain   - Provide non-pharmacologic/complimentary pain relief interventions  6/12/2025 1632 by Sandrine Rsuh RN  Outcome: Adequate for Discharge  6/12/2025 1209 by Sandrine Rush RN  Outcome: Progressing     Problem: INFECTION - ADULT  Goal: Absence or prevention of progression during hospitalization  Description: INTERVENTIONS:  - Assess and monitor for signs and symptoms of infection  - Monitor lab/diagnostic results  - Monitor all insertion sites, i.e. indwelling lines, tubes, and drains  - Administer medications as ordered  - Instruct and encourage patient and family to use good hand hygiene technique  - Identify and instruct in appropriate isolation precautions for identified infection/condition  6/12/2025 1632 by Sandrine Rush RN  Outcome: Adequate for Discharge  6/12/2025 1209 by Sandrine Rush RN  Outcome: Progressing     Problem: SAFETY ADULT  Goal: Patient will remain free of falls  Description: INTERVENTIONS:  - Keep Call bell within reach  - Keep bed low and locked with side rails adjusted as appropriate  - Keep care items and personal belongings within reach  - Initiate and maintain comfort rounds  6/12/2025 1632 by Sandrine Rush RN  Outcome: Adequate for Discharge  6/12/2025 1209 by Sandrine Rush RN  Outcome:  Progressing     Problem: DISCHARGE PLANNING  Goal: Discharge to home or other facility with appropriate resources  Description: INTERVENTIONS:  - Identify barriers to discharge w/patient and caregiver  - Arrange for needed discharge resources and transportation as appropriate  - Identify discharge learning needs (meds, wound care, etc.)  - Arrange for interpretive services to assist at discharge as needed  - Refer to Case Management Department for coordinating discharge planning if the patient needs post-hospital services based on physician/advanced practitioner order or complex needs related to functional status, cognitive ability, or social support system  2025 1632 by Sandrine Rush RN  Outcome: Adequate for Discharge  2025 1209 by Sandrine Rush RN  Outcome: Progressing     Problem: Knowledge Deficit  Goal: Patient/family/caregiver demonstrates understanding of disease process, treatment plan, medications, and discharge instructions  Description: Complete learning assessment and assess knowledge base.  Interventions:  - Provide teaching at level of understanding  - Provide teaching via preferred learning methods  2025 1632 by Sandrine Rush RN  Outcome: Adequate for Discharge  2025 1209 by Sandrine Rush RN  Outcome: Progressing     Problem: POSTPARTUM  Goal: Experiences normal postpartum course  Description: INTERVENTIONS:  - Monitor maternal vital signs  - Assess uterine involution and lochia  2025 1632 by Sandrine Rush RN  Outcome: Adequate for Discharge  2025 1209 by Sandrine Rush RN  Outcome: Progressing  Goal: Appropriate maternal -  bonding  Description: INTERVENTIONS:  - Identify family support  - Assess for appropriate maternal/infant bonding   -Encourage maternal/infant bonding opportunities  - Referral to  or  as needed  2025 1632 by Sandrine Rush RN  Outcome: Adequate for Discharge  2025 1209 by Sandrine Rush  RN  Outcome: Progressing  Goal: Establishment of infant feeding pattern  Description: INTERVENTIONS:  - Assess breast/bottle feeding  - Refer to lactation as needed  6/12/2025 1632 by Sandrine Rush RN  Outcome: Adequate for Discharge  6/12/2025 1209 by Sandrine Rush RN  Outcome: Progressing  Goal: Incision(s), wounds(s) or drain site(s) healing without S/S of infection  Description: INTERVENTIONS  - Assess and document dressing, incision, wound bed, drain sites and surrounding tissue  - Provide patient and family education  - Perform skin care/dressing changes every   6/12/2025 1632 by Sandrine Rush RN  Outcome: Adequate for Discharge  6/12/2025 1209 by Sandrine Rush RN  Outcome: Progressing

## 2025-06-12 NOTE — ASSESSMENT & PLAN NOTE
Recurrent UTIs this pregnancy vs. UTI which hasn't responded to treatment.  Questionable diagnosis of pyelonephritis in May 2025, ultimately deemed to be more consistent with a UTI.     Culture & antibiotic history:  12/2/24: Proteus, resistant to Macrobid & Tetracycline, treated with Keflex  12/23/24: Proteus, resistant to Macrobid & Tetracycline, treated with Ampicillin  3/24/25: Proteus, resistant to Macrobid & Tetracycline, treated with Keflex  5/15/25: Proteus, resistant to Macrobid & Tetracycline, treated with Ceftriaxone then Macrobid  6/8/25: culture pending, discharged on Cefpodoxime with plan for transition to Bactrim postpartum given prior culture-proven sensitivity  6/10/25: ID pharmacy consulted, recommend Amoxicillin 500 mg TID for 7 days

## 2025-06-12 NOTE — PROGRESS NOTES
Progress Note - OB/GYN   Name: Merline Webb 30 y.o. female I MRN: [id number]  Unit/Bed#: -01 I Date of Admission: 6/10/2025   Date of Service: 2025 I Hospital Day: 2     Assessment & Plan  Previous bariatric surgery complicating pregnancy  Sleeve 8/15/23  S/P  section  POD# 2  Recovering well   VSS  Pain well controlled with OTC meds- tylenol/motrion  Routine postpartum care, encourage ambulation, encourage familial bonding  Lactation support for breast/bottle feeding as needed  QBL: 189 Pre-delivery Hgb 10.6--> Post Delivery 9.0  Tolerating regular diet  Pain: Motrin/Tylenol around the clock, oxycodone if needed  Appropriate bowel and bladder function   Incision C/D/I   DVT Ppx: Ambulation, SCDs, Lovenox 40mg     Hepatitis C antibody positive in blood  + antibody on prenatal labs  F/u quant not detected  Also had +antibody with negative quant in prior pregnancy  Rubella non-immune status, antepartum  MMR postpartum  Recurrent UTI  Recurrent UTIs this pregnancy vs. UTI which hasn't responded to treatment.  Questionable diagnosis of pyelonephritis in May 2025, ultimately deemed to be more consistent with a UTI.     Culture & antibiotic history:  24: Proteus, resistant to Macrobid & Tetracycline, treated with Keflex  24: Proteus, resistant to Macrobid & Tetracycline, treated with Ampicillin  3/24/25: Proteus, resistant to Macrobid & Tetracycline, treated with Keflex  5/15/25: Proteus, resistant to Macrobid & Tetracycline, treated with Ceftriaxone then Macrobid  25: culture pending, discharged on Cefpodoxime with plan for transition to Bactrim postpartum given prior culture-proven sensitivity  6/10/25: ID pharmacy consulted, recommend Amoxicillin 500 mg TID for 7 days      Disposition    - Anticipate discharge home on PPD# 2-4      Subjective/Objective     Chief Complaint: Postpartum State     Subjective:    Merline Webb is PPD/POD#2 s/p  primary   "section, low transverse incision.   She has no current complaints.  Pain is well controlled. She is recovering well and is stable.     Breastfeeding:  yes    Tolerating PO: yes  Nausea or Vomiting: no  Voiding: yes  Flatus: yes; has had no bowel movement.   Ambulating: yes  Chest pain: no  Shortness of breath: no  Leg pain: no  Incision: c/d/I, steri strips in place  Lochia: appropriate   Perineum: intact    Vitals:   /53 (BP Location: Right arm)   Pulse 85   Temp (!) 97.3 °F (36.3 °C) (Temporal)   Resp 18   Ht 5' 3\" (1.6 m)   Wt 96.1 kg (211 lb 12.8 oz)   LMP 09/09/2024   SpO2 97%   Breastfeeding Yes   BMI 37.52 kg/m²       Intake/Output Summary (Last 24 hours) at 6/12/2025 0618  Last data filed at 6/11/2025 1234  Gross per 24 hour   Intake --   Output 200 ml   Net -200 ml       Invasive Devices       Peripheral Intravenous Line  Duration             Peripheral IV 06/10/25 Dorsal (posterior);Left Hand 1 day                    Physical Exam:   Physical Exam  Constitutional:       Appearance: Normal appearance.   HENT:      Head: Atraumatic.     Eyes:      Extraocular Movements: Extraocular movements intact.      Conjunctiva/sclera: Conjunctivae normal.       Cardiovascular:      Rate and Rhythm: Normal rate and regular rhythm.      Pulses: Normal pulses.   Pulmonary:      Effort: Pulmonary effort is normal. No respiratory distress.      Breath sounds: Normal breath sounds.   Abdominal:      Palpations: Abdomen is soft.      Tenderness: There is no abdominal tenderness.     Neurological:      General: No focal deficit present.      Mental Status: She is alert and oriented to person, place, and time.     Skin:     General: Skin is warm and dry.     Psychiatric:         Mood and Affect: Mood normal.         Behavior: Behavior normal.   Vitals reviewed. Exam conducted with a chaperone present.       GEN: Merline Webb appears well, alert and oriented x 3, pleasant and cooperative   CARDIO: " RRR, no murmurs or rubs  RESP:  CTAB, no wheezes or rales  ABDOMEN: soft, no tenderness, no distention, fundus @ U, Incision C/D/I  EXTREMITIES: SCDs on, non tender, no erythema, b/l Jared's sign negative      Labs:   Hemoglobin   Date Value Ref Range Status   06/11/2025 9.0 (L) 11.5 - 15.4 g/dL Final   06/10/2025 10.6 (L) 11.5 - 15.4 g/dL Final   06/14/2018 13.3 12.0 - 16.0 G/DL Final     WBC   Date Value Ref Range Status   06/11/2025 8.53 4.31 - 10.16 Thousand/uL Final   06/10/2025 8.95 4.31 - 10.16 Thousand/uL Final   06/14/2018 9.6 4.5 - 11.0 K/MCL Final     Platelets   Date Value Ref Range Status   06/11/2025 243 149 - 390 Thousands/uL Final   06/10/2025 288 149 - 390 Thousands/uL Final   06/14/2018 362 150 - 450 K/MCL Final          Martha Gomez MD  6/12/2025  6:18 AM

## 2025-06-13 NOTE — UTILIZATION REVIEW
"Mother and baby discharged 2025       NOTIFICATION OF INPATIENT ADMISSION   MATERNITY/DELIVERY AUTHORIZATION REQUEST   SERVICING FACILITY:   [facility]  Parent Child Health - L&D, [place], NICU  [address] [place] [address]  Tax ID: [id number]  NPI: [id number] ATTENDING PROVIDER:  Attending Name and NPI#: Dee Dey Md [[id number]]  Address: [address] [place] [address]  Phone: [phone]     ADMISSION INFORMATION:  Place of Service: Inpatient [facility]  Place of Service Code: 21  Inpatient Admission Date/Time: 6/10/25 12:04 PM  Discharge Date/Time: 2025  4:38 PM  Admitting Diagnosis Code/Description:  Encounter for  delivery without indication [O82]   Mother: Merline Webb 1995 Estimated Date of Delivery: 25  Delivering clinician:     OB History          2    Para   2    Term   2            AB        Living   1         SAB        IAB        Ectopic        Multiple   0    Live Births   1               [place] Name & MRN:   Information for the patient's :  Vernon Webb, Baby Girl (Merline) [[id number]]    Delivery Information:  Sex: female  Delivered 6/10/2025 2:47 PM by , Low Transverse; Gestational Age: 38w1d    [place] Measurements:  Weight: 6 lb 1.4 oz (2760 g);  Height: 19\"    APGAR 1 minute 5 minutes 10 minutes   Totals: 8 9       UTILIZATION REVIEW CONTACT:  Minerva Danielle, Utilization   Network Utilization Review Department  Phone: [phone]  Fax [phone]  Email: [email or web address]  Contact for approvals/pending authorizations, clinical reviews, and discharge.   PHYSICIAN ADVISORY SERVICES:  Medical Necessity Denial & Cjss-kx-Wqgj Review  Phone: [phone]  Fax: [phone]  Email: [email or web address]   DISCHARGE SUPPORT TEAM:  For Patients Discharge Needs & Updates  Phone: [phone] opt. 2 Fax: " [phone]  Email: [email or web address]

## 2025-06-17 ENCOUNTER — TELEPHONE (OUTPATIENT)
Age: 30
End: 2025-06-17

## 2025-06-17 NOTE — TELEPHONE ENCOUNTER
Called patient for postpartum follow up call.  Left message on voicemail.  Has postpartum appt scheduled on 6/26/2025.

## 2025-06-18 LAB — PLACENTA IN STORAGE: NORMAL

## 2025-06-20 ENCOUNTER — POSTPARTUM VISIT (OUTPATIENT)
Age: 30
End: 2025-06-20

## 2025-06-20 VITALS
HEIGHT: 63 IN | DIASTOLIC BLOOD PRESSURE: 74 MMHG | WEIGHT: 197.8 LBS | BODY MASS INDEX: 35.05 KG/M2 | SYSTOLIC BLOOD PRESSURE: 98 MMHG

## 2025-06-20 PROCEDURE — 99024 POSTOP FOLLOW-UP VISIT: CPT | Performed by: OBSTETRICS & GYNECOLOGY

## 2025-06-20 NOTE — PROGRESS NOTES
"Subjective    Patient is a 29 yo  who presents today s/p 1LTCS on 6/10/25 in the setting of a prior IUFD and shoulder dystocia. She feels well today and has no complaints. She is formula feeding. Bleeding and pain are minimal.    OB History          2    Para   2    Term   2            AB        Living   1         SAB        IAB        Ectopic        Multiple   0    Live Births   1                        Objective    Vitals:    25 1321   BP: 98/74   BP Location: Left arm   Patient Position: Sitting   Cuff Size: Standard   Weight: 89.7 kg (197 lb 12.8 oz)   Height: 5' 3\" (1.6 m)        Physical Exam  Constitutional:       Appearance: She is well-developed.     Cardiovascular:      Rate and Rhythm: Normal rate.   Pulmonary:      Effort: Pulmonary effort is normal. No respiratory distress.   Abdominal:      Palpations: Abdomen is soft.      Tenderness: There is no abdominal tenderness.      Comments: Incision C/D/I     Skin:     General: Skin is warm and dry.          Assessment    Problem List[1]     Plan  - EPDS 0  - Incision C/D/I  - Return for next postpartum visit in 3 weeks       [1]   Patient Active Problem List  Diagnosis    Herpes simplex vulvovaginitis    ASCUS with positive high risk HPV cervical    Previous bariatric surgery complicating pregnancy    HSV-2 seropositive    S/P  section    Hepatitis C antibody positive in blood    Rubella non-immune status, antepartum    Anxiety    Obesity, Class I, BMI 30-34.9    Abnormal glucose tolerance test in pregnancy    History of shoulder dystocia in prior pregnancy, currently pregnant    Herpes simplex virus type 2 (HSV-2) infection affecting pregnancy in third trimester    Encounter for supervision of high risk pregnancy in third trimester, antepartum    IUGR (intrauterine growth retardation) & stillbirth history, pregnant, third trimester    Recurrent UTI    History of stillbirth in currently pregnant patient, third trimester    " H/O bariatric surgery

## 2025-07-10 PROBLEM — N39.0 RECURRENT UTI: Status: RESOLVED | Noted: 2025-05-15 | Resolved: 2025-07-10

## 2025-07-18 ENCOUNTER — POSTPARTUM VISIT (OUTPATIENT)
Age: 30
End: 2025-07-18
Payer: MEDICARE

## 2025-07-18 VITALS
SYSTOLIC BLOOD PRESSURE: 102 MMHG | HEIGHT: 63 IN | WEIGHT: 196 LBS | BODY MASS INDEX: 34.73 KG/M2 | OXYGEN SATURATION: 98 % | HEART RATE: 72 BPM | DIASTOLIC BLOOD PRESSURE: 58 MMHG

## 2025-07-18 DIAGNOSIS — N89.8 VAGINAL DISCHARGE: ICD-10-CM

## 2025-07-18 DIAGNOSIS — Z30.016 ENCOUNTER FOR INITIAL PRESCRIPTION OF TRANSDERMAL PATCH HORMONAL CONTRACEPTIVE DEVICE: ICD-10-CM

## 2025-07-18 PROCEDURE — 87480 CANDIDA DNA DIR PROBE: CPT | Performed by: OBSTETRICS & GYNECOLOGY

## 2025-07-18 PROCEDURE — 87660 TRICHOMONAS VAGIN DIR PROBE: CPT | Performed by: OBSTETRICS & GYNECOLOGY

## 2025-07-18 PROCEDURE — 87510 GARDNER VAG DNA DIR PROBE: CPT | Performed by: OBSTETRICS & GYNECOLOGY

## 2025-07-18 RX ORDER — NORELGESTROMIN AND ETHINYL ESTRADIOL 35; 150 UG/MG; UG/MG
1 PATCH TRANSDERMAL WEEKLY
Qty: 4 PATCH | Refills: 11 | Status: SHIPPED | OUTPATIENT
Start: 2025-07-18 | End: 2025-07-21

## 2025-07-18 RX ORDER — CHOLECALCIFEROL (VITAMIN D3) 10(400)/ML
DROPS ORAL
COMMUNITY
Start: 2025-06-13

## 2025-07-18 NOTE — PROGRESS NOTES
"Subjective    Patient is a 29 yo  who presents today s/p 1LTCS on 6/10/25 in the setting of a prior IUFD and shoulder dystocia. She feels well today and has no complaints. She is formula feeding. She reports some unusual vaginal discharge today.    OB History          2    Para   2    Term   2            AB        Living   1         SAB        IAB        Ectopic        Multiple   0    Live Births   1                    Objective    Vitals:    25 1309   BP: 102/58   BP Location: Left arm   Patient Position: Sitting   Cuff Size: Large   Pulse: 72   SpO2: 98%   Weight: 88.9 kg (196 lb)   Height: 5' 3\" (1.6 m)        Physical Exam  Constitutional:       Appearance: She is well-developed.   Genitourinary:      Vulva normal.      Vaginal discharge present.     Cardiovascular:      Rate and Rhythm: Normal rate.   Pulmonary:      Effort: Pulmonary effort is normal. No respiratory distress.   Abdominal:      Palpations: Abdomen is soft.      Tenderness: There is no abdominal tenderness.      Comments: Incision C/D/I     Skin:     General: Skin is warm and dry.          Assessment    Problem List[1]     Plan  - Patch to start next week  - EPDS 0  - Affirm collected  - Return for annual exam         [1]   Patient Active Problem List  Diagnosis    Herpes simplex vulvovaginitis    ASCUS with positive high risk HPV cervical    Previous bariatric surgery complicating pregnancy    HSV-2 seropositive    S/P  section    Hepatitis C antibody positive in blood    Rubella non-immune status, antepartum    Anxiety    Obesity, Class I, BMI 30-34.9    Abnormal glucose tolerance test in pregnancy    History of shoulder dystocia in prior pregnancy, currently pregnant    Herpes simplex virus type 2 (HSV-2) infection affecting pregnancy in third trimester    Encounter for supervision of high risk pregnancy in third trimester, antepartum    IUGR (intrauterine growth retardation) & stillbirth history, pregnant, " third trimester    History of stillbirth in currently pregnant patient, third trimester    H/O bariatric surgery

## 2025-07-21 ENCOUNTER — TELEPHONE (OUTPATIENT)
Age: 30
End: 2025-07-21

## 2025-07-21 DIAGNOSIS — Z30.016 ENCOUNTER FOR INITIAL PRESCRIPTION OF TRANSDERMAL PATCH HORMONAL CONTRACEPTIVE DEVICE: Primary | ICD-10-CM

## 2025-07-21 RX ORDER — NORELGESTROMIN AND ETHINYL ESTRADIOL 150; 35 UG/D; UG/D
1 PATCH TRANSDERMAL WEEKLY
Qty: 4 PATCH | Refills: 11 | Status: SHIPPED | OUTPATIENT
Start: 2025-07-21 | End: 2026-07-21

## 2025-07-21 NOTE — TELEPHONE ENCOUNTER
Brand Name Xulane  is the preferred alternative, Generic Orth Evra  is not covered. Please send rx to pharmacy as Brand Name Xulane  please make sure the DENNISE box is checked off so rx when sending rx so it's sent correctly to pharmacy as Brand Name Only.

## 2025-07-27 ENCOUNTER — HOSPITAL ENCOUNTER (EMERGENCY)
Facility: HOSPITAL | Age: 30
Discharge: HOME | End: 2025-07-27
Payer: MEDICARE

## 2025-07-27 VITALS
SYSTOLIC BLOOD PRESSURE: 121 MMHG | TEMPERATURE: 99 F | WEIGHT: 194.45 LBS | OXYGEN SATURATION: 98 % | RESPIRATION RATE: 18 BRPM | BODY MASS INDEX: 34.44 KG/M2 | HEART RATE: 82 BPM | DIASTOLIC BLOOD PRESSURE: 71 MMHG

## 2025-07-27 DIAGNOSIS — R11.2 INTRACTABLE NAUSEA AND VOMITING: Primary | ICD-10-CM

## 2025-07-27 DIAGNOSIS — R19.5 LOOSE STOOLS: ICD-10-CM

## 2025-07-27 LAB
ALBUMIN SERPL BCG-MCNC: 4.4 G/DL (ref 3.5–5)
ALP SERPL-CCNC: 80 U/L (ref 34–104)
ALT SERPL W P-5'-P-CCNC: 15 U/L (ref 7–52)
ANION GAP SERPL CALCULATED.3IONS-SCNC: 8 MMOL/L (ref 4–13)
AST SERPL W P-5'-P-CCNC: 13 U/L (ref 13–39)
BASOPHILS # BLD AUTO: 0.02 THOUSANDS/ÂΜL (ref 0–0.1)
BASOPHILS NFR BLD AUTO: 0 % (ref 0–1)
BILIRUB SERPL-MCNC: 0.37 MG/DL (ref 0.2–1)
BUN SERPL-MCNC: 17 MG/DL (ref 5–25)
CALCIUM SERPL-MCNC: 9.2 MG/DL (ref 8.4–10.2)
CHLORIDE SERPL-SCNC: 109 MMOL/L (ref 96–108)
CO2 SERPL-SCNC: 22 MMOL/L (ref 21–32)
CREAT SERPL-MCNC: 0.8 MG/DL (ref 0.6–1.3)
EOSINOPHIL # BLD AUTO: 0.2 THOUSAND/ÂΜL (ref 0–0.61)
EOSINOPHIL NFR BLD AUTO: 2 % (ref 0–6)
ERYTHROCYTE [DISTWIDTH] IN BLOOD BY AUTOMATED COUNT: 15.1 % (ref 11.6–15.1)
GFR SERPL CREATININE-BSD FRML MDRD: 98 ML/MIN/1.73SQ M
GLUCOSE SERPL-MCNC: 131 MG/DL (ref 65–140)
HCG SERPL QL: NEGATIVE
HCT VFR BLD AUTO: 44.4 % (ref 34.8–46.1)
HGB BLD-MCNC: 14 G/DL (ref 11.5–15.4)
IMM GRANULOCYTES # BLD AUTO: 0.02 THOUSAND/UL (ref 0–0.2)
IMM GRANULOCYTES NFR BLD AUTO: 0 % (ref 0–2)
LIPASE SERPL-CCNC: 67 U/L (ref 11–82)
LYMPHOCYTES # BLD AUTO: 0.45 THOUSANDS/ÂΜL (ref 0.6–4.47)
LYMPHOCYTES NFR BLD AUTO: 4 % (ref 14–44)
MCH RBC QN AUTO: 24.4 PG (ref 26.8–34.3)
MCHC RBC AUTO-ENTMCNC: 31.5 G/DL (ref 31.4–37.4)
MCV RBC AUTO: 77 FL (ref 82–98)
MONOCYTES # BLD AUTO: 0.55 THOUSAND/ÂΜL (ref 0.17–1.22)
MONOCYTES NFR BLD AUTO: 5 % (ref 4–12)
NEUTROPHILS # BLD AUTO: 10.61 THOUSANDS/ÂΜL (ref 1.85–7.62)
NEUTS SEG NFR BLD AUTO: 89 % (ref 43–75)
NRBC BLD AUTO-RTO: 0 /100 WBCS
PLATELET # BLD AUTO: 342 THOUSANDS/UL (ref 149–390)
PMV BLD AUTO: 10.5 FL (ref 8.9–12.7)
POTASSIUM SERPL-SCNC: 3.6 MMOL/L (ref 3.5–5.3)
PROT SERPL-MCNC: 7.9 G/DL (ref 6.4–8.4)
RBC # BLD AUTO: 5.74 MILLION/UL (ref 3.81–5.12)
SODIUM SERPL-SCNC: 139 MMOL/L (ref 135–147)
WBC # BLD AUTO: 11.85 THOUSAND/UL (ref 4.31–10.16)

## 2025-07-27 PROCEDURE — 36415 COLL VENOUS BLD VENIPUNCTURE: CPT

## 2025-07-27 PROCEDURE — 80053 COMPREHEN METABOLIC PANEL: CPT

## 2025-07-27 PROCEDURE — 96375 TX/PRO/DX INJ NEW DRUG ADDON: CPT

## 2025-07-27 PROCEDURE — 96365 THER/PROPH/DIAG IV INF INIT: CPT

## 2025-07-27 PROCEDURE — 84703 CHORIONIC GONADOTROPIN ASSAY: CPT

## 2025-07-27 PROCEDURE — 99284 EMERGENCY DEPT VISIT MOD MDM: CPT

## 2025-07-27 PROCEDURE — 83690 ASSAY OF LIPASE: CPT

## 2025-07-27 PROCEDURE — 96376 TX/PRO/DX INJ SAME DRUG ADON: CPT

## 2025-07-27 PROCEDURE — 85025 COMPLETE CBC W/AUTO DIFF WBC: CPT

## 2025-07-27 RX ORDER — ACETAMINOPHEN 10 MG/ML
1000 INJECTION, SOLUTION INTRAVENOUS ONCE
Status: COMPLETED | OUTPATIENT
Start: 2025-07-27 | End: 2025-07-27

## 2025-07-27 RX ORDER — ONDANSETRON 2 MG/ML
4 INJECTION INTRAMUSCULAR; INTRAVENOUS ONCE
Status: COMPLETED | OUTPATIENT
Start: 2025-07-27 | End: 2025-07-27

## 2025-07-27 RX ORDER — ONDANSETRON 4 MG/1
4 TABLET, ORALLY DISINTEGRATING ORAL EVERY 6 HOURS PRN
Qty: 6 TABLET | Refills: 0 | Status: SHIPPED | OUTPATIENT
Start: 2025-07-27

## 2025-07-27 RX ORDER — FAMOTIDINE 10 MG/ML
20 INJECTION, SOLUTION INTRAVENOUS ONCE
Status: COMPLETED | OUTPATIENT
Start: 2025-07-27 | End: 2025-07-27

## 2025-07-27 RX ORDER — DROPERIDOL 2.5 MG/ML
0.62 INJECTION, SOLUTION INTRAMUSCULAR; INTRAVENOUS ONCE
Status: COMPLETED | OUTPATIENT
Start: 2025-07-27 | End: 2025-07-27

## 2025-07-27 RX ADMIN — ONDANSETRON 4 MG: 2 INJECTION INTRAMUSCULAR; INTRAVENOUS at 01:55

## 2025-07-27 RX ADMIN — FAMOTIDINE 20 MG: 10 INJECTION, SOLUTION INTRAVENOUS at 02:33

## 2025-07-27 RX ADMIN — ONDANSETRON 4 MG: 2 INJECTION INTRAMUSCULAR; INTRAVENOUS at 00:27

## 2025-07-27 RX ADMIN — DROPERIDOL 0.62 MG: 2.5 INJECTION, SOLUTION INTRAMUSCULAR; INTRAVENOUS at 02:33

## 2025-07-27 RX ADMIN — ACETAMINOPHEN 1000 MG: 10 INJECTION, SOLUTION INTRAVENOUS at 00:28

## 2025-08-08 DIAGNOSIS — Z30.016 ENCOUNTER FOR INITIAL PRESCRIPTION OF TRANSDERMAL PATCH HORMONAL CONTRACEPTIVE DEVICE: ICD-10-CM

## 2025-08-08 RX ORDER — NORELGESTROMIN AND ETHINYL ESTRADIOL 150; 35 UG/D; UG/D
1 PATCH TRANSDERMAL WEEKLY
Qty: 4 PATCH | Refills: 0 | OUTPATIENT
Start: 2025-08-08 | End: 2026-08-08

## 2025-08-10 ENCOUNTER — PATIENT MESSAGE (OUTPATIENT)
Age: 30
End: 2025-08-10

## 2025-08-23 DIAGNOSIS — Z30.016 ENCOUNTER FOR INITIAL PRESCRIPTION OF TRANSDERMAL PATCH HORMONAL CONTRACEPTIVE DEVICE: ICD-10-CM

## 2025-08-23 RX ORDER — NORELGESTROMIN AND ETHINYL ESTRADIOL 150; 35 UG/D; UG/D
1 PATCH TRANSDERMAL WEEKLY
Qty: 4 PATCH | Refills: 12 | Status: SHIPPED | OUTPATIENT
Start: 2025-08-23 | End: 2026-08-23

## (undated) DEVICE — KIT,BETHLEHEM C SECT DELIVERY: Brand: CARDINAL HEALTH

## (undated) DEVICE — GAUZE SPONGES,16 PLY: Brand: CURITY

## (undated) DEVICE — Device

## (undated) DEVICE — WOUND RETRACTOR AND PROTECTOR: Brand: ALEXIS O WOUND PROTECTOR-RETRACTOR

## (undated) DEVICE — TELFA NON-ADHERENT ABSORBENT DRESSING: Brand: TELFA

## (undated) DEVICE — SWABSTCK, BENZOIN TINCTURE, 1/PK, STRL: Brand: APLICARE

## (undated) DEVICE — KENDALL SCD SEQUENTIAL COMPRESSION COMFORT SLEEVES, KNEE LENGTH, MEDIUM: Brand: KENDALL SCD

## (undated) DEVICE — ABDOMINAL PAD: Brand: DERMACEA

## (undated) DEVICE — 3M™ STERI-STRIP™ REINFORCED ADHESIVE SKIN CLOSURES, R1547, 1/2 IN X 4 IN (12 MM X 100 MM), 6 STRIPS/ENVELOPE: Brand: 3M™ STERI-STRIP™